# Patient Record
Sex: MALE | Race: BLACK OR AFRICAN AMERICAN | Employment: UNEMPLOYED | URBAN - METROPOLITAN AREA
[De-identification: names, ages, dates, MRNs, and addresses within clinical notes are randomized per-mention and may not be internally consistent; named-entity substitution may affect disease eponyms.]

---

## 2017-05-09 ENCOUNTER — HOSPITAL ENCOUNTER (OUTPATIENT)
Age: 57
Setting detail: SPECIMEN
Discharge: HOME | End: 2017-05-09
Payer: COMMERCIAL

## 2017-05-09 LAB
ALBUMIN SERPL-MCNC: 4.2 G/DL (ref 3.5–5.2)
ALBUMIN/GLOBULIN RATIO: 1.2 (ref 1–2.5)
ALP BLD-CCNC: 71 U/L (ref 40–129)
ALT SERPL-CCNC: 28 U/L (ref 5–41)
ANION GAP SERPL CALCULATED.3IONS-SCNC: 14 MMOL/L (ref 9–17)
AST SERPL-CCNC: 21 U/L
BILIRUB SERPL-MCNC: 0.2 MG/DL (ref 0.3–1.2)
BUN BLDV-MCNC: 12 MG/DL (ref 6–20)
BUN/CREAT BLD: ABNORMAL (ref 9–20)
CALCIUM SERPL-MCNC: 9.7 MG/DL (ref 8.6–10.4)
CHLORIDE BLD-SCNC: 101 MMOL/L (ref 98–107)
CHOLESTEROL/HDL RATIO: 4.9
CHOLESTEROL: 236 MG/DL
CO2: 24 MMOL/L (ref 20–31)
CREAT SERPL-MCNC: 1 MG/DL (ref 0.7–1.2)
CREATININE URINE: 155.6 MG/DL (ref 39–259)
GFR AFRICAN AMERICAN: >60 ML/MIN
GFR NON-AFRICAN AMERICAN: >60 ML/MIN
GFR SERPL CREATININE-BSD FRML MDRD: ABNORMAL ML/MIN/{1.73_M2}
GFR SERPL CREATININE-BSD FRML MDRD: ABNORMAL ML/MIN/{1.73_M2}
GLUCOSE BLD-MCNC: 169 MG/DL (ref 70–99)
HDLC SERPL-MCNC: 48 MG/DL
LDL CHOLESTEROL: 135 MG/DL (ref 0–130)
MICROALBUMIN/CREAT 24H UR: 92 MG/L
MICROALBUMIN/CREAT UR-RTO: 59 MCG/MG CREAT
POTASSIUM SERPL-SCNC: 4.2 MMOL/L (ref 3.7–5.3)
SODIUM BLD-SCNC: 139 MMOL/L (ref 135–144)
TOTAL PROTEIN: 7.6 G/DL (ref 6.4–8.3)
TRIGL SERPL-MCNC: 267 MG/DL
VLDLC SERPL CALC-MCNC: ABNORMAL MG/DL (ref 1–30)

## 2017-05-10 LAB
ESTIMATED AVERAGE GLUCOSE: 197 MG/DL
HBA1C MFR BLD: 8.5 % (ref 4–6)

## 2017-05-12 ENCOUNTER — HOSPITAL ENCOUNTER (OUTPATIENT)
Age: 57
Setting detail: SPECIMEN
Discharge: HOME | End: 2017-05-12
Payer: COMMERCIAL

## 2017-05-12 LAB
DATE, STOOL #1: NORMAL
DATE, STOOL #2: NORMAL
DATE, STOOL #3: NORMAL
HEMOCCULT SP1 STL QL: NEGATIVE
HEMOCCULT SP2 STL QL: NEGATIVE
HEMOCCULT SP3 STL QL: NEGATIVE
TIME, STOOL #1: NORMAL
TIME, STOOL #2: NORMAL
TIME, STOOL #3: NORMAL

## 2017-07-12 ENCOUNTER — HOSPITAL ENCOUNTER (OUTPATIENT)
Age: 57
Setting detail: SPECIMEN
Discharge: HOME | End: 2017-07-12
Payer: COMMERCIAL

## 2017-07-12 LAB
ALBUMIN SERPL-MCNC: 4.2 G/DL (ref 3.5–5.2)
ALBUMIN/GLOBULIN RATIO: 1.2 (ref 1–2.5)
ALP BLD-CCNC: 61 U/L (ref 40–129)
ALT SERPL-CCNC: 17 U/L (ref 5–41)
ANION GAP SERPL CALCULATED.3IONS-SCNC: 16 MMOL/L (ref 9–17)
AST SERPL-CCNC: 19 U/L
BILIRUB SERPL-MCNC: 0.2 MG/DL (ref 0.3–1.2)
BUN BLDV-MCNC: 20 MG/DL (ref 6–20)
BUN/CREAT BLD: ABNORMAL (ref 9–20)
CALCIUM SERPL-MCNC: 9.6 MG/DL (ref 8.6–10.4)
CHLORIDE BLD-SCNC: 100 MMOL/L (ref 98–107)
CHOLESTEROL/HDL RATIO: 3.8
CHOLESTEROL: 188 MG/DL
CO2: 21 MMOL/L (ref 20–31)
CREAT SERPL-MCNC: 1.27 MG/DL (ref 0.7–1.2)
CREATININE URINE: 186.1 MG/DL (ref 39–259)
GFR AFRICAN AMERICAN: >60 ML/MIN
GFR NON-AFRICAN AMERICAN: 58 ML/MIN
GFR SERPL CREATININE-BSD FRML MDRD: ABNORMAL ML/MIN/{1.73_M2}
GFR SERPL CREATININE-BSD FRML MDRD: ABNORMAL ML/MIN/{1.73_M2}
GLUCOSE BLD-MCNC: 122 MG/DL (ref 70–99)
HDLC SERPL-MCNC: 49 MG/DL
LDL CHOLESTEROL: 116 MG/DL (ref 0–130)
MICROALBUMIN/CREAT 24H UR: 17 MG/L
MICROALBUMIN/CREAT UR-RTO: 9 MCG/MG CREAT
POTASSIUM SERPL-SCNC: 4.3 MMOL/L (ref 3.7–5.3)
SODIUM BLD-SCNC: 137 MMOL/L (ref 135–144)
TOTAL PROTEIN: 7.8 G/DL (ref 6.4–8.3)
TRIGL SERPL-MCNC: 114 MG/DL
VLDLC SERPL CALC-MCNC: NORMAL MG/DL (ref 1–30)

## 2017-07-13 LAB
ESTIMATED AVERAGE GLUCOSE: 157 MG/DL
HBA1C MFR BLD: 7.1 % (ref 4–6)

## 2017-12-04 ENCOUNTER — HOSPITAL ENCOUNTER (OUTPATIENT)
Age: 57
Setting detail: SPECIMEN
Discharge: HOME | End: 2017-12-04
Payer: COMMERCIAL

## 2017-12-05 LAB
ESTIMATED AVERAGE GLUCOSE: 137 MG/DL
HBA1C MFR BLD: 6.4 % (ref 4–6)

## 2018-07-27 ENCOUNTER — HOSPITAL ENCOUNTER (OUTPATIENT)
Age: 58
Setting detail: SPECIMEN
Discharge: HOME | End: 2018-07-27
Payer: COMMERCIAL

## 2018-07-27 LAB
ALBUMIN SERPL-MCNC: 4 G/DL (ref 3.5–5.2)
ALBUMIN/GLOBULIN RATIO: 1.2 (ref 1–2.5)
ALP BLD-CCNC: 61 U/L (ref 40–129)
ALT SERPL-CCNC: 16 U/L (ref 5–41)
ANION GAP SERPL CALCULATED.3IONS-SCNC: 12 MMOL/L (ref 9–17)
AST SERPL-CCNC: 16 U/L
BILIRUB SERPL-MCNC: 0.19 MG/DL (ref 0.3–1.2)
BUN BLDV-MCNC: 13 MG/DL (ref 8–23)
BUN/CREAT BLD: ABNORMAL (ref 9–20)
CALCIUM SERPL-MCNC: 9.1 MG/DL (ref 8.6–10.4)
CHLORIDE BLD-SCNC: 103 MMOL/L (ref 98–107)
CHOLESTEROL/HDL RATIO: 4.5
CHOLESTEROL: 191 MG/DL
CO2: 25 MMOL/L (ref 20–31)
CREAT SERPL-MCNC: 0.99 MG/DL (ref 0.7–1.2)
CREATININE URINE: 173.4 MG/DL (ref 39–259)
ESTIMATED AVERAGE GLUCOSE: 146 MG/DL
GFR AFRICAN AMERICAN: >60 ML/MIN
GFR NON-AFRICAN AMERICAN: >60 ML/MIN
GFR SERPL CREATININE-BSD FRML MDRD: ABNORMAL ML/MIN/{1.73_M2}
GFR SERPL CREATININE-BSD FRML MDRD: ABNORMAL ML/MIN/{1.73_M2}
GLUCOSE BLD-MCNC: 107 MG/DL (ref 70–99)
HBA1C MFR BLD: 6.7 % (ref 4–6)
HDLC SERPL-MCNC: 42 MG/DL
LDL CHOLESTEROL: 128 MG/DL (ref 0–130)
MICROALBUMIN/CREAT 24H UR: 14 MG/L
MICROALBUMIN/CREAT UR-RTO: 8 MCG/MG CREAT
POTASSIUM SERPL-SCNC: 4.4 MMOL/L (ref 3.7–5.3)
SODIUM BLD-SCNC: 140 MMOL/L (ref 135–144)
TOTAL PROTEIN: 7.4 G/DL (ref 6.4–8.3)
TRIGL SERPL-MCNC: 107 MG/DL
VLDLC SERPL CALC-MCNC: NORMAL MG/DL (ref 1–30)

## 2018-08-17 ENCOUNTER — TELEPHONE (OUTPATIENT)
Dept: GASTROENTEROLOGY | Age: 58
End: 2018-08-17

## 2018-08-22 ENCOUNTER — TELEPHONE (OUTPATIENT)
Dept: GASTROENTEROLOGY | Age: 58
End: 2018-08-22

## 2018-09-14 ENCOUNTER — HOSPITAL ENCOUNTER (OUTPATIENT)
Age: 58
Setting detail: SPECIMEN
Discharge: HOME | End: 2018-09-14
Payer: COMMERCIAL

## 2019-02-11 ENCOUNTER — HOSPITAL ENCOUNTER (OUTPATIENT)
Age: 59
Setting detail: SPECIMEN
Discharge: HOME | End: 2019-02-11
Payer: COMMERCIAL

## 2019-02-12 LAB
ESTIMATED AVERAGE GLUCOSE: 157 MG/DL
HBA1C MFR BLD: 7.1 % (ref 4–6)

## 2019-04-17 ENCOUNTER — HOSPITAL ENCOUNTER (OUTPATIENT)
Age: 59
Setting detail: SPECIMEN
Discharge: HOME | End: 2019-04-17
Payer: COMMERCIAL

## 2019-04-18 LAB
ESTIMATED AVERAGE GLUCOSE: 154 MG/DL
HBA1C MFR BLD: 7 % (ref 4–6)

## 2019-09-25 ENCOUNTER — HOSPITAL ENCOUNTER (OUTPATIENT)
Age: 59
Setting detail: SPECIMEN
Discharge: HOME | End: 2019-09-25
Payer: COMMERCIAL

## 2019-09-25 LAB
ABSOLUTE EOS #: 0.1 K/UL (ref 0–0.44)
ABSOLUTE IMMATURE GRANULOCYTE: <0.03 K/UL (ref 0–0.3)
ABSOLUTE LYMPH #: 3.01 K/UL (ref 1.1–3.7)
ABSOLUTE MONO #: 0.8 K/UL (ref 0.1–1.2)
ALBUMIN SERPL-MCNC: 4.3 G/DL (ref 3.5–5.2)
ALBUMIN/GLOBULIN RATIO: 1.2 (ref 1–2.5)
ALP BLD-CCNC: 55 U/L (ref 40–129)
ALT SERPL-CCNC: 11 U/L (ref 5–41)
ANION GAP SERPL CALCULATED.3IONS-SCNC: 12 MMOL/L (ref 9–17)
AST SERPL-CCNC: 14 U/L
BASOPHILS # BLD: 1 % (ref 0–2)
BASOPHILS ABSOLUTE: 0.04 K/UL (ref 0–0.2)
BILIRUB SERPL-MCNC: 0.39 MG/DL (ref 0.3–1.2)
BUN BLDV-MCNC: 21 MG/DL (ref 8–23)
BUN/CREAT BLD: ABNORMAL (ref 9–20)
CALCIUM SERPL-MCNC: 9.6 MG/DL (ref 8.6–10.4)
CHLORIDE BLD-SCNC: 99 MMOL/L (ref 98–107)
CO2: 26 MMOL/L (ref 20–31)
CREAT SERPL-MCNC: 1.43 MG/DL (ref 0.7–1.2)
DIFFERENTIAL TYPE: NORMAL
EOSINOPHILS RELATIVE PERCENT: 1 % (ref 1–4)
GFR AFRICAN AMERICAN: >60 ML/MIN
GFR NON-AFRICAN AMERICAN: 50 ML/MIN
GFR SERPL CREATININE-BSD FRML MDRD: ABNORMAL ML/MIN/{1.73_M2}
GFR SERPL CREATININE-BSD FRML MDRD: ABNORMAL ML/MIN/{1.73_M2}
GLUCOSE BLD-MCNC: 123 MG/DL (ref 70–99)
HCT VFR BLD CALC: 41.4 % (ref 40.7–50.3)
HEMOGLOBIN: 13.2 G/DL (ref 13–17)
IMMATURE GRANULOCYTES: 0 %
LYMPHOCYTES # BLD: 37 % (ref 24–43)
MCH RBC QN AUTO: 30.6 PG (ref 25.2–33.5)
MCHC RBC AUTO-ENTMCNC: 31.9 G/DL (ref 28.4–34.8)
MCV RBC AUTO: 96.1 FL (ref 82.6–102.9)
MONOCYTES # BLD: 10 % (ref 3–12)
NRBC AUTOMATED: 0 PER 100 WBC
PDW BLD-RTO: 12.9 % (ref 11.8–14.4)
PLATELET # BLD: 377 K/UL (ref 138–453)
PLATELET ESTIMATE: NORMAL
PMV BLD AUTO: 11.3 FL (ref 8.1–13.5)
POTASSIUM SERPL-SCNC: 3.8 MMOL/L (ref 3.7–5.3)
RBC # BLD: 4.31 M/UL (ref 4.21–5.77)
RBC # BLD: NORMAL 10*6/UL
SEG NEUTROPHILS: 51 % (ref 36–65)
SEGMENTED NEUTROPHILS ABSOLUTE COUNT: 4.22 K/UL (ref 1.5–8.1)
SODIUM BLD-SCNC: 137 MMOL/L (ref 135–144)
TOTAL PROTEIN: 7.8 G/DL (ref 6.4–8.3)
WBC # BLD: 8.2 K/UL (ref 3.5–11.3)
WBC # BLD: NORMAL 10*3/UL

## 2019-11-06 ENCOUNTER — HOSPITAL ENCOUNTER (OUTPATIENT)
Age: 59
Setting detail: SPECIMEN
Discharge: HOME | End: 2019-11-06
Payer: COMMERCIAL

## 2019-11-06 LAB — PROSTATE SPECIFIC ANTIGEN: 15.94 UG/L

## 2020-07-09 ENCOUNTER — TELEPHONE (OUTPATIENT)
Dept: UROLOGY | Age: 60
End: 2020-07-09

## 2020-10-29 ENCOUNTER — TELEPHONE (OUTPATIENT)
Dept: UROLOGY | Age: 60
End: 2020-10-29

## 2020-10-29 ENCOUNTER — OFFICE VISIT (OUTPATIENT)
Dept: UROLOGY | Age: 60
End: 2020-10-29
Payer: COMMERCIAL

## 2020-10-29 VITALS
SYSTOLIC BLOOD PRESSURE: 140 MMHG | BODY MASS INDEX: 38.17 KG/M2 | WEIGHT: 288 LBS | TEMPERATURE: 97.3 F | HEART RATE: 65 BPM | DIASTOLIC BLOOD PRESSURE: 89 MMHG | HEIGHT: 73 IN

## 2020-10-29 PROCEDURE — 99204 OFFICE O/P NEW MOD 45 MIN: CPT | Performed by: UROLOGY

## 2020-10-29 RX ORDER — CIPROFLOXACIN 500 MG/1
500 TABLET, FILM COATED ORAL 2 TIMES DAILY
Qty: 8 TABLET | Refills: 0 | Status: SHIPPED | OUTPATIENT
Start: 2020-11-16 | End: 2020-11-20

## 2020-10-29 RX ORDER — HYDROCHLOROTHIAZIDE 25 MG/1
25 TABLET ORAL DAILY
COMMUNITY

## 2020-10-29 RX ORDER — LISINOPRIL 40 MG/1
40 TABLET ORAL DAILY
COMMUNITY

## 2020-10-29 RX ORDER — SILDENAFIL 100 MG/1
100 TABLET, FILM COATED ORAL PRN
COMMUNITY
End: 2021-06-03 | Stop reason: ALTCHOICE

## 2020-10-29 RX ORDER — ATENOLOL 50 MG/1
50 TABLET ORAL DAILY
COMMUNITY

## 2020-10-29 RX ORDER — PRAVASTATIN SODIUM 40 MG
40 TABLET ORAL DAILY
COMMUNITY

## 2020-10-29 RX ORDER — AMLODIPINE BESYLATE 10 MG/1
10 TABLET ORAL DAILY
COMMUNITY
End: 2022-09-23

## 2020-10-29 ASSESSMENT — ENCOUNTER SYMPTOMS
EYE REDNESS: 0
SHORTNESS OF BREATH: 0
WHEEZING: 0
EYE PAIN: 0
NAUSEA: 0
VOMITING: 0
COUGH: 0
BACK PAIN: 0
DIARRHEA: 0
ABDOMINAL PAIN: 0
CONSTIPATION: 0

## 2020-10-29 NOTE — PROGRESS NOTES
[address] [address]  Dept: [address] [facility] Urology Office Note - New Patient    Patient:  Jose R Ruiz  YOB: 1960  Date: 10/29/2020    The patient is a 56 y.o. male who presentstoday for evaluation of the following problems:   Chief Complaint   Patient presents with    New Patient    Elevated PSA    referred by Fifi Fong. HPI  Here for high psa, its 15 ng/ml. No prostatitis symptoms, has h/o bph, and ED. Takes viagra, no family hx of prostate cancer. No dyaruia, no hematuria, mod stream    (Patient's old records have been requested, reviewed and summarized in today's note.)    Summary of old records: N/A    History: N/A    ProceduresToday: N/A    Urinalysis today:  No results found for this visit on 10/29/20.     AUA Symptom Score (10/29/2020):  INCOMPLETE EMPTYING: How often have you had the sensation of not emptying your bladder?: Not at all  FREQUENCY: How often do you have to urinate less than every two hours?: Not at all  INTERMITTENCY: How often have you found you stopped and started again several times when you urinated?: Not at all  URGENCY: How often have you found it difficult to postpone urination?: Not at all  WEAK STREAM: How often have you had a weak urinary stream?: Not at all  STRAINING: How often have you had to strain to start  urination?: Not at all  NOCTURIA: How many times did you typically get up at night to uriniate?: 1 Time  TOTAL I-PSS SCORE[de-identified] 1  How would you feel if you were to spend the rest of your life with your urinary condition?: Pleased    Last BUN andcreatinine:  Lab Results   Component Value Date    BUN 21 09/25/2019     Lab Results   Component Value Date    CREATININE 1.43 (H) 09/25/2019       Additional Lab/Culture results: none    Reviewed during this Office Visit: none  (results were independently reviewed byphysician and radiology report verified)    PAST MEDICAL, FAMILY AND SOCIAL HISTORY:  Past Medical History:   Diagnosis Date    Back pain     Diabetes mellitus ([address]     Dyslipidemia     Elevated PSA     Erectile dysfunction     Hyperlipemia     Hypertension     LVH (left ventricular hypertrophy)     Pedal edema     Systolic murmur      Past Surgical History:   Procedure Laterality Date    HERNIA REPAIR       History reviewed. No pertinent family history. Outpatient Medications Marked as Taking for the 10/29/20 encounter (Office Visit) with Helga Angelucci, MD   Medication Sig Dispense Refill    SITagliptin (JANUVIA) 50 MG tablet Take 50 mg by mouth daily      atenolol (TENORMIN) 50 MG tablet Take 50 mg by mouth daily      metFORMIN (GLUCOPHAGE) 1000 MG tablet Take 1,000 mg by mouth 2 times daily (with meals)      amLODIPine (NORVASC) 10 MG tablet Take 10 mg by mouth daily      hydroCHLOROthiazide (HYDRODIURIL) 25 MG tablet Take 25 mg by mouth daily      lisinopril (PRINIVIL;ZESTRIL) 40 MG tablet Take 40 mg by mouth daily      pravastatin (PRAVACHOL) 40 MG tablet Take 40 mg by mouth daily      sildenafil (VIAGRA) 100 MG tablet Take 100 mg by mouth as needed for Erectile Dysfunction         Patient has no known allergies. Social History     Tobacco Use   Smoking Status Never Smoker   Smokeless Tobacco Never Used      (If patient a smoker, smoking cessation counseling offered)   Social History     Substance and Sexual Activity   Alcohol Use Yes       REVIEW OF SYSTEMS:  Review of Systems    Physical Exam:    This a 56 y.o. female      Vitals:    10/29/20 0925   BP: (!) 140/89   Pulse: 65   Temp: 97.3 °F (36.3 °C)     Body mass index is 38 kg/m². Physical Exam  Constitutional: Patient in no acute distress, ggod grooming, appropriately dressed  Neuro: Alert and oriented to person, place and time.   Psych:Mood normal, affect normal  Skin: No rash noted  HEENT: Head: Normocephalic and atraumatic,Conjunctivae and EOM are normal,Nose- normal, Right/Left External Ear: normal, Mouth: Mucosa Moist  Neck: Supple  Lungs: Respiratory effort is normal  Cardiovascular: strong and regular, no lower leg edema  Abdomen: Soft, non-tender, non-distended with no CVA,    Lymphatics: No cervical palpable lymphadenopathy. Bladder non-tender and not distended. Musculoskeletal: Normal gait and station        Assessment and Plan      1. Benign prostatic hyperplasia with incomplete bladder emptying    2. Elevated PSA    3. Erectile dysfunction due to arterial insufficiency            Plan:    trus prostate biopsy at [facility] or [facility]  cipro    Prescriptions Ordered:  No orders of the defined types were placed in this encounter. Orders Placed:  No orders of the defined types were placed in this encounter. Teddy Cuellar MD    Agree with the ROS entered by the MA.

## 2020-10-29 NOTE — PROGRESS NOTES
Cipro ordered per Dr Sho Schneider biopsy protocol. Pt to begin taking Cipro twice daily the day before procedure. Pt is aware, per surgery scheduler Gayle.

## 2020-10-29 NOTE — TELEPHONE ENCOUNTER
Prostate BX & US @ [facility] 11/17/20 12:30pm **STOP BLOOD THINNERS 11/10/20**   PAT @ [facility] 12/03/20 2:45pm   covid-19 @ [facility] 11/13/20 8:15am         Spoke with patient in office, procedure info given to patient

## 2020-10-29 NOTE — PROGRESS NOTES
Review of Systems   Constitutional: Negative for appetite change, chills and fever. Eyes: Negative for pain, redness and visual disturbance. Respiratory: Negative for cough, shortness of breath and wheezing. Cardiovascular: Negative for chest pain and leg swelling. Gastrointestinal: Negative for abdominal pain, constipation, diarrhea, nausea and vomiting. Genitourinary: Positive for testicular pain (left side). Negative for difficulty urinating, dysuria, flank pain, frequency, hematuria and urgency. Musculoskeletal: Negative for back pain, joint swelling and myalgias. Skin: Negative for rash and wound. Neurological: Negative for dizziness, tremors and numbness. Hematological: Does not bruise/bleed easily.

## 2020-11-03 ENCOUNTER — HOSPITAL ENCOUNTER (OUTPATIENT)
Dept: PREADMISSION TESTING | Age: 60
Discharge: HOME | End: 2020-11-07
Payer: COMMERCIAL

## 2020-11-03 VITALS
HEIGHT: 73 IN | HEART RATE: 73 BPM | SYSTOLIC BLOOD PRESSURE: 155 MMHG | WEIGHT: 293 LBS | RESPIRATION RATE: 16 BRPM | TEMPERATURE: 98.1 F | BODY MASS INDEX: 38.83 KG/M2 | OXYGEN SATURATION: 100 % | DIASTOLIC BLOOD PRESSURE: 81 MMHG

## 2020-11-03 LAB
ABSOLUTE EOS #: 0.1 K/UL (ref 0–0.4)
ABSOLUTE IMMATURE GRANULOCYTE: ABNORMAL K/UL (ref 0–0.3)
ABSOLUTE LYMPH #: 3 K/UL (ref 1–4.8)
ABSOLUTE MONO #: 0.6 K/UL (ref 0.1–1.3)
ANION GAP SERPL CALCULATED.3IONS-SCNC: 10 MMOL/L (ref 9–17)
BASOPHILS # BLD: 1 % (ref 0–2)
BASOPHILS ABSOLUTE: 0.1 K/UL (ref 0–0.2)
BUN BLDV-MCNC: 17 MG/DL (ref 8–23)
BUN/CREAT BLD: ABNORMAL (ref 9–20)
CALCIUM SERPL-MCNC: 9.3 MG/DL (ref 8.6–10.4)
CHLORIDE BLD-SCNC: 100 MMOL/L (ref 98–107)
CO2: 27 MMOL/L (ref 20–31)
CREAT SERPL-MCNC: 1.3 MG/DL (ref 0.7–1.2)
DIFFERENTIAL TYPE: ABNORMAL
EOSINOPHILS RELATIVE PERCENT: 1 % (ref 0–4)
GFR AFRICAN AMERICAN: >60 ML/MIN
GFR NON-AFRICAN AMERICAN: 56 ML/MIN
GFR SERPL CREATININE-BSD FRML MDRD: ABNORMAL ML/MIN/{1.73_M2}
GFR SERPL CREATININE-BSD FRML MDRD: ABNORMAL ML/MIN/{1.73_M2}
GLUCOSE BLD-MCNC: 267 MG/DL (ref 70–99)
HCT VFR BLD CALC: 40 % (ref 41–53)
HEMOGLOBIN: 14.1 G/DL (ref 13.5–17.5)
IMMATURE GRANULOCYTES: ABNORMAL %
LYMPHOCYTES # BLD: 38 % (ref 24–44)
MCH RBC QN AUTO: 32 PG (ref 26–34)
MCHC RBC AUTO-ENTMCNC: 35.2 G/DL (ref 31–37)
MCV RBC AUTO: 90.9 FL (ref 80–100)
MONOCYTES # BLD: 8 % (ref 1–7)
NRBC AUTOMATED: ABNORMAL PER 100 WBC
PDW BLD-RTO: 13 % (ref 11.5–14.9)
PLATELET # BLD: 324 K/UL (ref 150–450)
PLATELET ESTIMATE: ABNORMAL
PMV BLD AUTO: 8.9 FL (ref 6–12)
POTASSIUM SERPL-SCNC: 4.1 MMOL/L (ref 3.7–5.3)
RBC # BLD: 4.4 M/UL (ref 4.5–5.9)
RBC # BLD: ABNORMAL 10*6/UL
SEG NEUTROPHILS: 52 % (ref 36–66)
SEGMENTED NEUTROPHILS ABSOLUTE COUNT: 4.2 K/UL (ref 1.3–9.1)
SODIUM BLD-SCNC: 137 MMOL/L (ref 135–144)
WBC # BLD: 8 K/UL (ref 3.5–11)
WBC # BLD: ABNORMAL 10*3/UL

## 2020-11-03 PROCEDURE — 85025 COMPLETE CBC W/AUTO DIFF WBC: CPT

## 2020-11-03 PROCEDURE — 36415 COLL VENOUS BLD VENIPUNCTURE: CPT

## 2020-11-03 PROCEDURE — 93005 ELECTROCARDIOGRAM TRACING: CPT | Performed by: ANESTHESIOLOGY

## 2020-11-03 PROCEDURE — 87086 URINE CULTURE/COLONY COUNT: CPT

## 2020-11-03 PROCEDURE — 80048 BASIC METABOLIC PNL TOTAL CA: CPT

## 2020-11-03 NOTE — H&P
HISTORY and [address]       NAME:  Mehnaz Mac  MRN: [id number]   YOB: 1960   Date: 11/3/2020   Age: 56 y.o. Gender: male       COMPLAINT AND PRESENT HISTORY:     Mehnaz Mac is 56 y.o. male, here for preanesthesia testing for elevated PSA with scheduled prostate biopsy. Per chart notes, PSA around 15. Denies history of BPH. No family history of prostate cancer. Denies dysuria, hematuria, weak stream or nocturia. Reports left testicle pain intermittently especially when wearing tight underwear. Denies  flank pain, gross hematuria, kidney stones and recurrent UTI. PMHx  DM: Pt unsure of last A1C. Takes [place] and metformin. HTN: Takes amlodipine, lisinopril, HCTZ, atentolol. Denies chest pain/pressure, palpitations, SOB, dizziness, headaches or changes in vision. Denies complications with anesthesia. PAST MEDICAL HISTORY     Past Medical History:   Diagnosis Date    Back pain     Diabetes mellitus ([address]     Dyslipidemia     Elevated PSA     Erectile dysfunction     Hyperlipemia     Hypertension     LVH (left ventricular hypertrophy)     Pedal edema     Systolic murmur      Pt denies any history of stroke, heart disease, COPD, Asthma, GERD, Cancer, Seizures,Thyroid disease, Kidney Disease, Hepatitis, TB, Psychiatric Disorders or Substance abuse. SURGICAL HISTORY       Past Surgical History:   Procedure Laterality Date    HERNIA REPAIR      as baby    HERNIA REPAIR         FAMILY HISTORY     History reviewed. No pertinent family history.     SOCIAL HISTORY       Social History     Socioeconomic History    Marital status: Single     Spouse name: None    Number of children: None    Years of education: None    Highest education level: None   Occupational History    None   Social Needs    Financial resource strain: None    Food insecurity     Worry: None     Inability: None    Transportation needs     Medical: None     Non-medical: None Tobacco Use    Smoking status: Never Smoker    Smokeless tobacco: Never Used   Substance and Sexual Activity    Alcohol use: Yes     Comment: socially    Drug use: Never    Sexual activity: Yes   Lifestyle    Physical activity     Days per week: None     Minutes per session: None    Stress: None   Relationships    Social connections     Talks on phone: None     Gets together: None     Attends Adventism service: None     Active member of club or organization: None     Attends meetings of clubs or organizations: None     Relationship status: None    Intimate partner violence     Fear of current or ex partner: None     Emotionally abused: None     Physically abused: None     Forced sexual activity: None   Other Topics Concern    None   Social History Narrative    None        REVIEW OF SYSTEMS      No Known Allergies    Current Outpatient Medications on File Prior to Encounter   Medication Sig Dispense Refill    SITagliptin (JANUVIA) 50 MG tablet Take 50 mg by mouth daily      atenolol (TENORMIN) 50 MG tablet Take 50 mg by mouth daily      metFORMIN (GLUCOPHAGE) 1000 MG tablet Take 1,000 mg by mouth 2 times daily (with meals)      amLODIPine (NORVASC) 10 MG tablet Take 10 mg by mouth daily      hydroCHLOROthiazide (HYDRODIURIL) 25 MG tablet Take 25 mg by mouth daily      lisinopril (PRINIVIL;ZESTRIL) 40 MG tablet Take 40 mg by mouth daily      pravastatin (PRAVACHOL) 40 MG tablet Take 40 mg by mouth daily      sildenafil (VIAGRA) 100 MG tablet Take 100 mg by mouth as needed for Erectile Dysfunction      [START ON 11/16/2020] ciprofloxacin (CIPRO) 500 MG tablet Take 1 tablet by mouth 2 times daily for 4 days Start the day before the procedure 8 tablet 0     No current facility-administered medications on file prior to encounter. General health:  Fairly good. No fever or chills. Skin:  No itching, redness or rash. HEENT:  No headache, epistaxis or sore throat. Neck:  No pain, stiffness or masses. Cardiovascular/Respiratory system:  No chest pain, palpitation or shortness of breath. Gastrointestinal tract: No abdominal pain, Dysphagia, nausea, vomiting, diarrhea or constipation. Genitourinary:  See HPI. Locomotor:  No bone or joint pains. No swelling. Neuropsychiatric:  No referable complaints. GENERAL PHYSICAL EXAM:     Vitals: BP (!) 155/81   Pulse 73   Temp 98.1 °F (36.7 °C) (Infrared)   Resp 16   Ht 6' 1\" (1.854 m)   Wt 293 lb (132.9 kg)   SpO2 100%   BMI 38.66 kg/m²  Body mass index is 38.66 kg/m². GENERAL APPEARANCE:   Rambo Woods is 56 y.o.,  male, moderately obese, nourished, conscious, alert. Does not appear to be in distress or pain at this time. SKIN:  Warm, dry, no cyanosis or jaundice. HEAD:  Normocephalic, atraumatic. EYES:  Pupils equal, reactive to light. EARS:  No discharge, no marked hearing loss. NOSE:  No rhinorrhea, epistaxis or septal deformity. THROAT:  Not congested. No ulceration bleeding or discharge. NECK:  No stiffness, trachea central.  No palpable masses or L.N.                 CHEST:  Symmetrical and equal on expansion. HEART:  Hypertensive. RRR S1 > S2. 2/6 systolic murmur auscultated                LUNGS:  Equal on expansion, normal breath sounds. No wheezing, rhonchi or rales. ABDOMEN:  Soft on palpation. No localized tenderness. No guarding or rigidity. LYMPHATICS:  No palpable cervical lymphadenopathy. LOCOMOTOR, BACK AND SPINE:  No tenderness or deformities. EXTREMITIES:  Symmetrical, no pretibial edema. No calf tenderness. No discoloration or ulcerations. NEUROLOGIC:  The patient is conscious, alert, oriented.  No facial drooping. Speech clear. No apparent focal sensory or motor deficits. PROVISIONAL DIAGNOSES / SURGERY:      ELEVATED PSA    PROSTATE BIOPSY W/ US & TECH TO OR    There are no active problems to display for this patient.           MAURICIO Elena - CNP on 11/3/2020 at 3:41 PM

## 2020-11-03 NOTE — H&P (VIEW-ONLY)
HISTORY and [address]       NAME:  Tisha Solomon  MRN: [id number]   YOB: 1960   Date: 11/3/2020   Age: 56 y.o. Gender: male       COMPLAINT AND PRESENT HISTORY:     Tisha Solomon is 56 y.o. male, here for preanesthesia testing for elevated PSA with scheduled prostate biopsy. Per chart notes, PSA around 15. Denies history of BPH. No family history of prostate cancer. Denies dysuria, hematuria, weak stream or nocturia. Reports left testicle pain intermittently especially when wearing tight underwear. Denies  flank pain, gross hematuria, kidney stones and recurrent UTI. PMHx  DM: Pt unsure of last A1C. Takes [place] and [place] and metformin. HTN: Takes amlodipine, lisinopril, HCTZ, atentolol. Denies chest pain/pressure, palpitations, SOB, dizziness, headaches or changes in vision. Denies complications with anesthesia. PAST MEDICAL HISTORY     Past Medical History:   Diagnosis Date    Back pain     Diabetes mellitus ([address]     Dyslipidemia     Elevated PSA     Erectile dysfunction     Hyperlipemia     Hypertension     LVH (left ventricular hypertrophy)     Pedal edema     Systolic murmur      Pt denies any history of stroke, heart disease, COPD, Asthma, GERD, Cancer, Seizures,Thyroid disease, Kidney Disease, Hepatitis, TB, Psychiatric Disorders or Substance abuse. SURGICAL HISTORY       Past Surgical History:   Procedure Laterality Date    HERNIA REPAIR      as baby    HERNIA REPAIR         FAMILY HISTORY     History reviewed. No pertinent family history.     SOCIAL HISTORY       Social History     Socioeconomic History    Marital status: Single     Spouse name: None    Number of children: None    Years of education: None    Highest education level: None   Occupational History    None   Social Needs    Financial resource strain: None    Food insecurity     Worry: None     Inability: None    Transportation needs     Medical: None     Non-medical: None Tobacco Use    Smoking status: Never Smoker    Smokeless tobacco: Never Used   Substance and Sexual Activity    Alcohol use: Yes     Comment: socially    Drug use: Never    Sexual activity: Yes   Lifestyle    Physical activity     Days per week: None     Minutes per session: None    Stress: None   Relationships    Social connections     Talks on phone: None     Gets together: None     Attends Taoist service: None     Active member of club or organization: None     Attends meetings of clubs or organizations: None     Relationship status: None    Intimate partner violence     Fear of current or ex partner: None     Emotionally abused: None     Physically abused: None     Forced sexual activity: None   Other Topics Concern    None   Social History Narrative    None        REVIEW OF SYSTEMS      No Known Allergies    Current Outpatient Medications on File Prior to Encounter   Medication Sig Dispense Refill    SITagliptin (JANUVIA) 50 MG tablet Take 50 mg by mouth daily      atenolol (TENORMIN) 50 MG tablet Take 50 mg by mouth daily      metFORMIN (GLUCOPHAGE) 1000 MG tablet Take 1,000 mg by mouth 2 times daily (with meals)      amLODIPine (NORVASC) 10 MG tablet Take 10 mg by mouth daily      hydroCHLOROthiazide (HYDRODIURIL) 25 MG tablet Take 25 mg by mouth daily      lisinopril (PRINIVIL;ZESTRIL) 40 MG tablet Take 40 mg by mouth daily      pravastatin (PRAVACHOL) 40 MG tablet Take 40 mg by mouth daily      sildenafil (VIAGRA) 100 MG tablet Take 100 mg by mouth as needed for Erectile Dysfunction      [START ON 11/16/2020] ciprofloxacin (CIPRO) 500 MG tablet Take 1 tablet by mouth 2 times daily for 4 days Start the day before the procedure 8 tablet 0     No current facility-administered medications on file prior to encounter. General health:  Fairly good. No fever or chills. Skin:  No itching, redness or rash. HEENT:  No headache, epistaxis or sore throat. facial drooping. Speech clear. No apparent focal sensory or motor deficits. PROVISIONAL DIAGNOSES / SURGERY:      ELEVATED PSA    PROSTATE BIOPSY W/ US & TECH TO OR    There are no active problems to display for this patient.           MAURICIO Winslow - CNP on 11/3/2020 at 3:41 PM

## 2020-11-04 LAB
CULTURE: NO GROWTH
Lab: NORMAL
SPECIMEN DESCRIPTION: NORMAL

## 2020-11-05 ENCOUNTER — TELEPHONE (OUTPATIENT)
Dept: UROLOGY | Age: 60
End: 2020-11-05

## 2020-11-05 NOTE — TELEPHONE ENCOUNTER
Left message with patient that procedure time on 11/17/2020 has been changed to 10:00am with an arrival of 8:00am. No eating or drinking after midnight, fleets enema will need to be done at 7:00am. Stated in message that I can e-mail new procedure info or it can be picked up in the office.

## 2020-11-06 LAB
EKG ATRIAL RATE: 72 BPM
EKG P AXIS: 0 DEGREES
EKG P-R INTERVAL: 200 MS
EKG Q-T INTERVAL: 386 MS
EKG QRS DURATION: 86 MS
EKG QTC CALCULATION (BAZETT): 422 MS
EKG R AXIS: -5 DEGREES
EKG T AXIS: 17 DEGREES
EKG VENTRICULAR RATE: 72 BPM

## 2020-11-06 PROCEDURE — 93010 ELECTROCARDIOGRAM REPORT: CPT | Performed by: INTERNAL MEDICINE

## 2020-11-13 ENCOUNTER — HOSPITAL ENCOUNTER (OUTPATIENT)
Dept: PREADMISSION TESTING | Age: 60
Setting detail: SPECIMEN
Discharge: HOME | End: 2020-11-17
Payer: COMMERCIAL

## 2020-11-13 PROCEDURE — U0003 INFECTIOUS AGENT DETECTION BY NUCLEIC ACID (DNA OR RNA); SEVERE ACUTE RESPIRATORY SYNDROME CORONAVIRUS 2 (SARS-COV-2) (CORONAVIRUS DISEASE [COVID-19]), AMPLIFIED PROBE TECHNIQUE, MAKING USE OF HIGH THROUGHPUT TECHNOLOGIES AS DESCRIBED BY CMS-2020-01-R: HCPCS

## 2020-11-14 LAB
SARS-COV-2, RAPID: NORMAL
SARS-COV-2: NORMAL
SARS-COV-2: NOT DETECTED
SOURCE: NORMAL

## 2020-11-16 ENCOUNTER — ANESTHESIA EVENT (OUTPATIENT)
Dept: OPERATING ROOM | Age: 60
End: 2020-11-16
Payer: COMMERCIAL

## 2020-11-17 ENCOUNTER — HOSPITAL ENCOUNTER (OUTPATIENT)
Dept: ULTRASOUND IMAGING | Age: 60
Setting detail: OUTPATIENT SURGERY
Discharge: HOME | End: 2020-11-19
Attending: UROLOGY
Payer: COMMERCIAL

## 2020-11-17 ENCOUNTER — ANESTHESIA (OUTPATIENT)
Dept: OPERATING ROOM | Age: 60
End: 2020-11-17
Payer: COMMERCIAL

## 2020-11-17 ENCOUNTER — HOSPITAL ENCOUNTER (OUTPATIENT)
Age: 60
Setting detail: OUTPATIENT SURGERY
Discharge: HOME | End: 2020-11-17
Attending: UROLOGY | Admitting: UROLOGY
Payer: COMMERCIAL

## 2020-11-17 VITALS — DIASTOLIC BLOOD PRESSURE: 44 MMHG | SYSTOLIC BLOOD PRESSURE: 94 MMHG | OXYGEN SATURATION: 100 %

## 2020-11-17 VITALS
OXYGEN SATURATION: 100 % | WEIGHT: 293 LBS | DIASTOLIC BLOOD PRESSURE: 82 MMHG | HEART RATE: 58 BPM | TEMPERATURE: 97 F | HEIGHT: 73 IN | SYSTOLIC BLOOD PRESSURE: 129 MMHG | BODY MASS INDEX: 38.83 KG/M2 | RESPIRATION RATE: 16 BRPM

## 2020-11-17 LAB
GLUCOSE BLD-MCNC: 134 MG/DL (ref 75–110)
GLUCOSE BLD-MCNC: 137 MG/DL (ref 75–110)

## 2020-11-17 PROCEDURE — 7100000000 HC PACU RECOVERY - FIRST 15 MIN: Performed by: UROLOGY

## 2020-11-17 PROCEDURE — 3600000002 HC SURGERY LEVEL 2 BASE: Performed by: UROLOGY

## 2020-11-17 PROCEDURE — 88344 IMHCHEM/IMCYTCHM EA MLT ANTB: CPT

## 2020-11-17 PROCEDURE — 7100000001 HC PACU RECOVERY - ADDTL 15 MIN: Performed by: UROLOGY

## 2020-11-17 PROCEDURE — 55700 US BIOPSY PROSTATE NEEDLE/PUNCH: CPT

## 2020-11-17 PROCEDURE — 7100000030 HC ASPR PHASE II RECOVERY - FIRST 15 MIN: Performed by: UROLOGY

## 2020-11-17 PROCEDURE — 88305 TISSUE EXAM BY PATHOLOGIST: CPT

## 2020-11-17 PROCEDURE — 6360000002 HC RX W HCPCS: Performed by: NURSE ANESTHETIST, CERTIFIED REGISTERED

## 2020-11-17 PROCEDURE — 2500000003 HC RX 250 WO HCPCS: Performed by: UROLOGY

## 2020-11-17 PROCEDURE — 7100000011 HC PHASE II RECOVERY - ADDTL 15 MIN: Performed by: UROLOGY

## 2020-11-17 PROCEDURE — 3600000012 HC SURGERY LEVEL 2 ADDTL 15MIN: Performed by: UROLOGY

## 2020-11-17 PROCEDURE — 82947 ASSAY GLUCOSE BLOOD QUANT: CPT

## 2020-11-17 PROCEDURE — 6360000002 HC RX W HCPCS: Performed by: UROLOGY

## 2020-11-17 PROCEDURE — 3700000001 HC ADD 15 MINUTES (ANESTHESIA): Performed by: UROLOGY

## 2020-11-17 PROCEDURE — 3700000000 HC ANESTHESIA ATTENDED CARE: Performed by: UROLOGY

## 2020-11-17 PROCEDURE — 7100000031 HC ASPR PHASE II RECOVERY - ADDTL 15 MIN: Performed by: UROLOGY

## 2020-11-17 PROCEDURE — 2709999900 HC NON-CHARGEABLE SUPPLY: Performed by: UROLOGY

## 2020-11-17 PROCEDURE — 2580000003 HC RX 258: Performed by: ANESTHESIOLOGY

## 2020-11-17 PROCEDURE — 7100000010 HC PHASE II RECOVERY - FIRST 15 MIN: Performed by: UROLOGY

## 2020-11-17 RX ORDER — 0.9 % SODIUM CHLORIDE 0.9 %
500 INTRAVENOUS SOLUTION INTRAVENOUS
Status: DISCONTINUED | OUTPATIENT
Start: 2020-11-17 | End: 2020-11-17 | Stop reason: HOSPADM

## 2020-11-17 RX ORDER — SODIUM CHLORIDE 0.9 % (FLUSH) 0.9 %
10 SYRINGE (ML) INJECTION EVERY 12 HOURS SCHEDULED
Status: DISCONTINUED | OUTPATIENT
Start: 2020-11-17 | End: 2020-11-17 | Stop reason: HOSPADM

## 2020-11-17 RX ORDER — HYDRALAZINE HYDROCHLORIDE 20 MG/ML
5 INJECTION INTRAMUSCULAR; INTRAVENOUS EVERY 10 MIN PRN
Status: DISCONTINUED | OUTPATIENT
Start: 2020-11-17 | End: 2020-11-17 | Stop reason: HOSPADM

## 2020-11-17 RX ORDER — LABETALOL HYDROCHLORIDE 5 MG/ML
5 INJECTION, SOLUTION INTRAVENOUS EVERY 10 MIN PRN
Status: DISCONTINUED | OUTPATIENT
Start: 2020-11-17 | End: 2020-11-17 | Stop reason: HOSPADM

## 2020-11-17 RX ORDER — DIPHENHYDRAMINE HYDROCHLORIDE 50 MG/ML
12.5 INJECTION INTRAMUSCULAR; INTRAVENOUS
Status: DISCONTINUED | OUTPATIENT
Start: 2020-11-17 | End: 2020-11-17 | Stop reason: HOSPADM

## 2020-11-17 RX ORDER — LIDOCAINE HYDROCHLORIDE 10 MG/ML
INJECTION, SOLUTION EPIDURAL; INFILTRATION; INTRACAUDAL; PERINEURAL PRN
Status: DISCONTINUED | OUTPATIENT
Start: 2020-11-17 | End: 2020-11-17 | Stop reason: ALTCHOICE

## 2020-11-17 RX ORDER — SODIUM CHLORIDE, SODIUM LACTATE, POTASSIUM CHLORIDE, CALCIUM CHLORIDE 600; 310; 30; 20 MG/100ML; MG/100ML; MG/100ML; MG/100ML
INJECTION, SOLUTION INTRAVENOUS CONTINUOUS
Status: DISCONTINUED | OUTPATIENT
Start: 2020-11-17 | End: 2020-11-17 | Stop reason: HOSPADM

## 2020-11-17 RX ORDER — PROMETHAZINE HYDROCHLORIDE 25 MG/ML
6.25 INJECTION, SOLUTION INTRAMUSCULAR; INTRAVENOUS
Status: DISCONTINUED | OUTPATIENT
Start: 2020-11-17 | End: 2020-11-17 | Stop reason: HOSPADM

## 2020-11-17 RX ORDER — SODIUM CHLORIDE 0.9 % (FLUSH) 0.9 %
10 SYRINGE (ML) INJECTION PRN
Status: DISCONTINUED | OUTPATIENT
Start: 2020-11-17 | End: 2020-11-17 | Stop reason: HOSPADM

## 2020-11-17 RX ORDER — PROPOFOL 10 MG/ML
INJECTION, EMULSION INTRAVENOUS PRN
Status: DISCONTINUED | OUTPATIENT
Start: 2020-11-17 | End: 2020-11-17 | Stop reason: SDUPTHER

## 2020-11-17 RX ORDER — METOCLOPRAMIDE HYDROCHLORIDE 5 MG/ML
10 INJECTION INTRAMUSCULAR; INTRAVENOUS
Status: DISCONTINUED | OUTPATIENT
Start: 2020-11-17 | End: 2020-11-17 | Stop reason: HOSPADM

## 2020-11-17 RX ORDER — SODIUM CHLORIDE 9 MG/ML
INJECTION, SOLUTION INTRAVENOUS CONTINUOUS
Status: DISCONTINUED | OUTPATIENT
Start: 2020-11-17 | End: 2020-11-17 | Stop reason: HOSPADM

## 2020-11-17 RX ORDER — PROPOFOL 10 MG/ML
INJECTION, EMULSION INTRAVENOUS CONTINUOUS PRN
Status: DISCONTINUED | OUTPATIENT
Start: 2020-11-17 | End: 2020-11-17 | Stop reason: SDUPTHER

## 2020-11-17 RX ORDER — LIDOCAINE HYDROCHLORIDE 10 MG/ML
1 INJECTION, SOLUTION EPIDURAL; INFILTRATION; INTRACAUDAL; PERINEURAL
Status: DISCONTINUED | OUTPATIENT
Start: 2020-11-17 | End: 2020-11-17 | Stop reason: HOSPADM

## 2020-11-17 RX ORDER — FENTANYL CITRATE 50 UG/ML
25 INJECTION, SOLUTION INTRAMUSCULAR; INTRAVENOUS EVERY 5 MIN PRN
Status: DISCONTINUED | OUTPATIENT
Start: 2020-11-17 | End: 2020-11-17 | Stop reason: HOSPADM

## 2020-11-17 RX ORDER — OXYCODONE HYDROCHLORIDE AND ACETAMINOPHEN 5; 325 MG/1; MG/1
1 TABLET ORAL
Status: DISCONTINUED | OUTPATIENT
Start: 2020-11-17 | End: 2020-11-17 | Stop reason: HOSPADM

## 2020-11-17 RX ADMIN — PROPOFOL 125 MCG/KG/MIN: 10 INJECTION, EMULSION INTRAVENOUS at 10:41

## 2020-11-17 RX ADMIN — PROPOFOL 100 MG: 10 INJECTION, EMULSION INTRAVENOUS at 10:41

## 2020-11-17 RX ADMIN — Medication 2 G: at 10:43

## 2020-11-17 RX ADMIN — PROPOFOL 50 MG: 10 INJECTION, EMULSION INTRAVENOUS at 10:43

## 2020-11-17 RX ADMIN — SODIUM CHLORIDE, POTASSIUM CHLORIDE, SODIUM LACTATE AND CALCIUM CHLORIDE: 600; 310; 30; 20 INJECTION, SOLUTION INTRAVENOUS at 08:43

## 2020-11-17 ASSESSMENT — PULMONARY FUNCTION TESTS
PIF_VALUE: 0
PIF_VALUE: 1
PIF_VALUE: 0
PIF_VALUE: 1
PIF_VALUE: 0
PIF_VALUE: 1
PIF_VALUE: 0
PIF_VALUE: 0

## 2020-11-17 ASSESSMENT — PAIN SCALES - GENERAL
PAINLEVEL_OUTOF10: 0
PAINLEVEL_OUTOF10: 0

## 2020-11-17 NOTE — ANESTHESIA POSTPROCEDURE EVALUATION
Department of Anesthesiology  Postprocedure Note    Patient: Dareen Siemens  MRN: [id number]  YOB: 1960  Date of evaluation: 11/17/2020  Time:  12:45 PM     Procedure Summary     Date:  11/17/20 Room / Location:  [address] / [address]    Anesthesia Start:  0331 Anesthesia Stop:  0658    Procedure:  PROSTATE BIOPSY W/ [address] (N/A Scrotum) Diagnosis:  (ELEVATED PSA)    Surgeon:  Murali Hernandez MD Responsible Provider:  Ulices Montalvo MD    Anesthesia Type:  MAC ASA Status:  3          Anesthesia Type: MAC    Claudio Phase I: Claudio Score: 10    Claudio Phase II:      Last vitals: Reviewed and per EMR flowsheets.        Anesthesia Post Evaluation    Comments: POST- ANESTHESIA EVALUATION       Pt Name: Dareen Siemens  MRN: [id number]  YOB: 1960  Date of evaluation: 11/17/2020  Time:  12:45 PM      /75   Pulse 60   Temp 97 °F (36.1 °C) (Temporal)   Resp 16   Ht 6' 1\" (1.854 m)   Wt 293 lb (132.9 kg)   SpO2 100%   BMI 38.66 kg/m²      Consciousness Level  Awake  Cardiopulmonary Status  Stable  Pain Adequately Treated YES  Nausea / Vomiting  NO  Adequate Hydration  YES  Anesthesia Related Complications NONE      Electronically signed by Ulices Montalvo MD on 11/17/2020 at 12:45 PM

## 2020-11-17 NOTE — INTERVAL H&P NOTE
Update History & Physical    The patient's History and Physical of November 3, 2020 was reviewed with the patient and I examined the patient. There was no change. The surgical site was confirmed by the patient and me. Patient has been NPO since midnight. No blood thinners in the past 7 days. Patient took his cardiac meds without any water. Plan: The risks, benefits, expected outcome, and alternative to the recommended procedure have been discussed with the patient. Patient understands and wants to proceed with the procedure.      Electronically signed by MAURICIO Rosales CNP on 11/17/2020 at 8:06 AM

## 2020-11-17 NOTE — ANESTHESIA PRE PROCEDURE
Department of Anesthesiology  Preprocedure Note       Name:  Caridad Mccallum   Age:  56 y.o.  :  1960                                          MRN:  [id number]         Date:  2020      Surgeon: Oswaldo Bautista):  Perla Diaz MD    Procedure: Procedure(s):  PROSTATE BIOPSY W/ US & TECH TO OR    Medications prior to admission:   Prior to Admission medications    Medication Sig Start Date End Date Taking?  Authorizing Provider   SITagliptin (JANUVIA) 50 MG tablet Take 50 mg by mouth daily    Historical Provider, MD   atenolol (TENORMIN) 50 MG tablet Take 50 mg by mouth daily    Historical Provider, MD   metFORMIN (GLUCOPHAGE) 1000 MG tablet Take 1,000 mg by mouth 2 times daily (with meals)    Historical Provider, MD   amLODIPine (NORVASC) 10 MG tablet Take 10 mg by mouth daily    Historical Provider, MD   hydroCHLOROthiazide (HYDRODIURIL) 25 MG tablet Take 25 mg by mouth daily    Historical Provider, MD   lisinopril (PRINIVIL;ZESTRIL) 40 MG tablet Take 40 mg by mouth daily    Historical Provider, MD   pravastatin (PRAVACHOL) 40 MG tablet Take 40 mg by mouth daily    Historical Provider, MD   sildenafil (VIAGRA) 100 MG tablet Take 100 mg by mouth as needed for Erectile Dysfunction    Historical Provider, MD   ciprofloxacin (CIPRO) 500 MG tablet Take 1 tablet by mouth 2 times daily for 4 days Start the day before the procedure 20  Perla Diaz MD       Current medications:    Current Facility-Administered Medications   Medication Dose Route Frequency Provider Last Rate Last Dose    lactated ringers infusion   Intravenous Continuous [place] MD Carey        sodium chloride flush 0.9 % injection 10 mL  10 mL Intravenous 2 times per day Allie Mccloud MD        sodium chloride flush 0.9 % injection 10 mL  10 mL Intravenous PRN [place] MD Carey        lidocaine PF 1 % injection 1 mL  1 mL Intradermal Once PRN Silvia Patino MD        0.9 % sodium chloride infusion   Intravenous Continuous [place] MD Carey           Allergies:  No Known Allergies    Problem List:  There is no problem list on file for this patient. Past Medical History:        Diagnosis Date    Back pain     Diabetes mellitus (HCC)     Dyslipidemia     Elevated PSA     Erectile dysfunction     Hyperlipemia     Hypertension     LVH (left ventricular hypertrophy)     Pedal edema     Systolic murmur        Past Surgical History:        Procedure Laterality Date    HERNIA REPAIR      as baby    HERNIA REPAIR         Social History:    Social History     Tobacco Use    Smoking status: Never Smoker    Smokeless tobacco: Never Used   Substance Use Topics    Alcohol use: Yes     Comment: socially                                Counseling given: Not Answered      Vital Signs (Current): There were no vitals filed for this visit. BP Readings from Last 3 Encounters:   11/03/20 (!) 155/81   10/29/20 (!) 140/89       NPO Status:                                                                                 BMI:   Wt Readings from Last 3 Encounters:   11/03/20 293 lb (132.9 kg)   10/29/20 288 lb (130.6 kg)     There is no height or weight on file to calculate BMI.    CBC:   Lab Results   Component Value Date    WBC 8.0 11/03/2020    RBC 4.40 11/03/2020    HGB 14.1 11/03/2020    HCT 40.0 11/03/2020    MCV 90.9 11/03/2020    RDW 13.0 11/03/2020     11/03/2020       CMP:   Lab Results   Component Value Date     11/03/2020    K 4.1 11/03/2020     11/03/2020    CO2 27 11/03/2020    BUN 17 11/03/2020    CREATININE 1.30 11/03/2020    GFRAA >60 11/03/2020    LABGLOM 56 11/03/2020    GLUCOSE 267 11/03/2020    PROT 7.8 09/25/2019    CALCIUM 9.3 11/03/2020    BILITOT 0.39 09/25/2019    ALKPHOS 55 09/25/2019    AST 14 09/25/2019    ALT 11 09/25/2019       POC Tests: No results for input(s): POCGLU, POCNA, POCK, POCCL, POCBUN, POCHEMO, POCHCT in the last 72 hours.     Coags: No results found for: PROTIME, INR, APTT    HCG (If Applicable): No results found for: PREGTESTUR, PREGSERUM, HCG, HCGQUANT     ABGs: No results found for: PHART, PO2ART, YDI9JLK, XZV1SHV, BEART, N6LOMNHS     Type & Screen (If Applicable):  No results found for: LABABO, LABRH    Drug/Infectious Status (If Applicable):  No results found for: HIV, HEPCAB    COVID-19 Screening (If Applicable):   Lab Results   Component Value Date    COVID19 Not Detected 11/13/2020         Anesthesia Evaluation  Patient summary reviewed and Nursing notes reviewed no history of anesthetic complications:   Airway: Mallampati: III  TM distance: >3 FB   Neck ROM: full  Mouth opening: > = 3 FB Dental:          Pulmonary:Negative Pulmonary ROS and normal exam  breath sounds clear to auscultation                             Cardiovascular:    (+) hypertension:, valvular problems/murmurs:, murmur, hyperlipidemia      ECG reviewed  Rhythm: regular  Rate: normal                 ROS comment: Normal sinus rhythm  Normal ECG    Estimated LV EF 55 %   Mild left ventricular hypertrophy. Left atrium is mildly dilated. Mild mitral regurgitation. Moderate tricuspid regurgitation. Estimated right ventricular systolic pressure is 46 mm Hg      Neuro/Psych:   Negative Neuro/Psych ROS              GI/Hepatic/Renal:   (+) morbid obesity          Endo/Other:    (+) Diabetes, . Abdominal:           Vascular:                                      Anesthesia Plan      MAC     ASA 3       Induction: intravenous. MIPS: Postoperative opioids intended and Prophylactic antiemetics administered. Anesthetic plan and risks discussed with patient. Plan discussed with CRNA. The patient was counseled at length about the risks of cheryl Covid-19 during their perioperative period and any recovery window from their procedure.   The patient was made aware that cheryl Covid-19  may worsen their prognosis for recovering from their

## 2020-11-17 NOTE — BRIEF OP NOTE
Brief Postoperative Note      Patient: Hafsa Renee  YOB: 1960  MRN: [id number]    Date of Procedure: 11/17/2020    Pre-Op Diagnosis: ELEVATED PSA    Post-Op Diagnosis: Same       Procedure(s):  PROSTATE BIOPSY W/ US & TECH TO OR    Surgeon(s):  Dania Hogan MD    Assistant:  * No surgical staff found *    Anesthesia: Monitor Anesthesia Care    Estimated Blood Loss (mL): Minimal    Complications: None    Specimens:   * No specimens in log *    Implants:  * No implants in log *      Drains: * No LDAs found *    Findings: d    Electronically signed by Dania Hogan MD on 11/17/2020 at 8:37 AM

## 2020-11-19 NOTE — OP NOTE
[address] [facility]                 [address] [facility], [address]                                OPERATIVE REPORT    PATIENT NAME: Sara Dias                       :        1960  MED REC NO:   [id number]                              ROOM:  ACCOUNT NO:   [de-identified]                           ADMIT DATE: 2020  PROVIDER:     Na Benites    DATE OF PROCEDURE:  2020    PREOPERATIVE DIAGNOSIS:  Elevated PSA. POSTOPERATIVE DIAGNOSIS:  Elevated PSA. OPERATION PERFORMED:  Transrectal ultrasound-guided prostate biopsy. SURGEON:  Na Benites MD    ASSISTANT:  None. ANESTHESIA:  MAC.    COMPLICATIONS:  None. ESTIMATED BLOOD LOSS:  Minimal.    NARRATIVE OF PROCEDURE:  This is a 73-year-old white male, who has a  history of elevated PSA and BPH. Given this, the above procedure was  scheduled. All the risks and benefits were explained to the patient and  informed consent was obtained. OPERATIVE PROCEDURE:  This 73-year-old white male was brought back to  the operating room and positioned in the modified flank position. After  MAC anesthesia was started, he was sterilely prepped and draped in  standard fashion. A transrectal ultrasound probe was placed into his  rectum and the prostate was examined. No hyperechoic or hypoechoic  lesions were noted. The urethra was normal.  The bladder was normal.   The total volume of the prostate was 62 cubic centimeters. After this,  1% lidocaine was used to infiltrate the neurovascular bundles and then  biopsies were taken from the left lateral base, left medial base, left  lateral mid gland, left medial mid gland, left lateral apex, left medial  apex. The same was done on the other side with biopsies from the right  lateral base, right medial base, right lateral mid gland, right medial  mid gland, right lateral apex, right medial apex. All the specimens  were sent as permanent specimens to Pathology.   I then removed the scope  and that was the end of the procedure. The patient will be discharged  home. He will follow up with me in 10 days to discuss the results. I  was present and scrubbed throughout the entire case.         Astrid Serrano    D: 11/18/2020 11:17:26       T: 11/18/2020 21:25:14     [id number]  Job#: [id number]     Doc#: [id number]    CC:  Na Benites

## 2020-11-20 LAB — SURGICAL PATHOLOGY REPORT: NORMAL

## 2020-12-03 ENCOUNTER — OFFICE VISIT (OUTPATIENT)
Dept: UROLOGY | Age: 60
End: 2020-12-03
Payer: COMMERCIAL

## 2020-12-03 VITALS
SYSTOLIC BLOOD PRESSURE: 129 MMHG | DIASTOLIC BLOOD PRESSURE: 89 MMHG | WEIGHT: 293 LBS | HEART RATE: 76 BPM | HEIGHT: 73 IN | TEMPERATURE: 98.2 F | BODY MASS INDEX: 38.83 KG/M2 | RESPIRATION RATE: 18 BRPM

## 2020-12-03 PROCEDURE — 99214 OFFICE O/P EST MOD 30 MIN: CPT | Performed by: UROLOGY

## 2020-12-03 ASSESSMENT — ENCOUNTER SYMPTOMS
CONSTIPATION: 0
NAUSEA: 0
ABDOMINAL PAIN: 0
BACK PAIN: 0
DIARRHEA: 0
COUGH: 0
VOMITING: 0
SHORTNESS OF BREATH: 0
WHEEZING: 0
EYE PAIN: 0

## 2020-12-03 NOTE — PROGRESS NOTES
Review of Systems   Constitutional: Negative for appetite change, chills, fatigue and fever. Eyes: Negative for pain and visual disturbance. Respiratory: Negative for cough, shortness of breath and wheezing. Cardiovascular: Negative for chest pain and leg swelling. Gastrointestinal: Negative for abdominal pain, constipation, diarrhea, nausea and vomiting. Genitourinary: Negative for difficulty urinating, dysuria, frequency, hematuria, penile pain and testicular pain. Musculoskeletal: Negative for back pain and myalgias. Neurological: Positive for tremors and numbness. Negative for dizziness, weakness, light-headedness and headaches. Hematological: Negative for adenopathy. Does not bruise/bleed easily.

## 2020-12-03 NOTE — PROGRESS NOTES
[address] [address]  Dept: [address] [facility] Office Note - Established    Patient:  Caridad Mccallum  YOB: 1960  Date: 12/3/2020    The patient is a 56 y.o. male who presents todayfor evaluation of the following problems:   Chief Complaint   Patient presents with    Follow-up     s/p Prostate bx results       HPI  Here for prostate biopsy results, biopsy reviewed and shows vasu 6 and 8 cancer. He also has bph. Urinates ok, no cardiac hx      Summary of old records: N/A    Additional History: N/A    Procedures Today: N/A    Urinalysis today:  No results found for this visit on 12/03/20.   Last several PSA's:  Lab Results   Component Value Date    PSA 15.94 (H) 11/06/2019     Last total testosterone:  No results found for: TESTOSTERONE    AUA Symptom Score (12/3/2020):  INCOMPLETE EMPTYING: How often have you had the sensation of not emptying your bladder?: Less than 1 to 5 times  FREQUENCY: How often do you have to urinate less than every two hours?: Not at all  INTERMITTENCY: How often have you found you stopped and started again several times when you urinated?: Not at all  URGENCY: How often have you found it difficult to postpone urination?: Not at all  WEAK STREAM: How often have you had a weak urinary stream?: Less than 1 to 5 times  STRAINING: How often have you had to strain to start  urination?: Not at all  NOCTURIA: How many times did you typically get up at night to uriniate?: 1 Time  TOTAL I-PSS SCORE[de-identified] 3  How would you feel if you were to spend the rest of your life with your urinary condition?: Pleased    Last BUN and creatinine:  Lab Results   Component Value Date    BUN 17 11/03/2020     Lab Results   Component Value Date    CREATININE 1.30 (H) 11/03/2020       Additional Lab/Culture results: none    Imaging Reviewed during this Office Visit: none  (results were independently reviewed by physician and radiology report verified)    PAST MEDICAL, FAMILY AND SOCIAL HISTORY UPDATE:  Past Medical History:   Diagnosis Date    Back pain     Diabetes mellitus ([address]     Dyslipidemia     Elevated PSA     Erectile dysfunction     Hyperlipemia     Hypertension     LVH (left ventricular hypertrophy)     Pedal edema     Systolic murmur      Past Surgical History:   Procedure Laterality Date    HERNIA REPAIR      as baby    HERNIA REPAIR      PROSTATE BIOPSY N/A 11/17/2020    PROSTATE BIOPSY W/ US & TECH TO OR performed by Dalton Teran MD at [facility]  11/17/2020    US PROSTATE NEEDLE PUNCH 11/17/2020 [facility]     No family history on file. Outpatient Medications Marked as Taking for the 12/3/20 encounter (Office Visit) with Dalton Teran MD   Medication Sig Dispense Refill    SITagliptin (JANUVIA) 50 MG tablet Take 50 mg by mouth daily      atenolol (TENORMIN) 50 MG tablet Take 50 mg by mouth daily      metFORMIN (GLUCOPHAGE) 1000 MG tablet Take 1,000 mg by mouth 2 times daily (with meals)      amLODIPine (NORVASC) 10 MG tablet Take 10 mg by mouth daily      hydroCHLOROthiazide (HYDRODIURIL) 25 MG tablet Take 25 mg by mouth daily      lisinopril (PRINIVIL;ZESTRIL) 40 MG tablet Take 40 mg by mouth daily      pravastatin (PRAVACHOL) 40 MG tablet Take 40 mg by mouth daily      sildenafil (VIAGRA) 100 MG tablet Take 100 mg by mouth as needed for Erectile Dysfunction         Patient has no known allergies. Social History     Tobacco Use   Smoking Status Never Smoker   Smokeless Tobacco Never Used     (Ifpatient a smoker, smoking cessation counseling offered)    Social History     Substance and Sexual Activity   Alcohol Use Yes    Comment: socially       REVIEW OF SYSTEMS:  Review of Systems    Physical Exam:      Vitals:    12/03/20 1156   BP: 129/89   Pulse: 76   Resp: 18   Temp: 98.2 °F (36.8 °C)     Body mass index is 38.66 kg/m².   Patient is a 56 y.o. male in no acute distress and alert and oriented to person, place and time. Physical Exam  Constitutional: Patient in no acute distress. Neuro: Alert and oriented to person, place and time. Psych: Mood normal, affect normal  Skin: No rash noted  HEENT: Head: Normocephalic andatraumatic  Conjunctivae and EOM are normal. Pupils are equal, round  Nose:Normal  Right External Ear: Normal; Left External Ear: Normal  Mouth: Mucosa Moist  Neck: Supple  Lungs: Respiratory effort is normal  Cardiovascular: Warm & Pink  Abdomen: Soft, non-tender, non-distended with no CVA,  No flank tenderness,  Or hepatosplenomegaly   Lymphatics: No palpablelymphadenopathy. Assessment and Plan      1. Prostate cancer ([address]    2. Elevated PSA    3. Benign prostatic hyperplasia with incomplete bladder emptying           Plan:     after imaging  sami surgery,   Do mod standard and nodes  Return in about 2 weeks (around 12/17/2020) for Follow up. Prescriptions Ordered:  No orders of the defined types were placed in this encounter. Orders Placed:  Orders Placed This Encounter   Procedures    NM BONE SCAN WHOLE BODY     Standing Status:   Future     Standing Expiration Date:   12/4/2021    CT ABDOMEN PELVIS W IV CONTRAST Additional Contrast? None     Standing Status:   Future     Standing Expiration Date:   12/3/2021     Order Specific Question:   Additional Contrast?     Answer:   None           Mayuri Munroe MD    Agree with the ROS entered by the MA.

## 2020-12-09 ENCOUNTER — HOSPITAL ENCOUNTER (OUTPATIENT)
Dept: CT IMAGING | Age: 60
Discharge: HOME | End: 2020-12-11
Payer: COMMERCIAL

## 2020-12-09 ENCOUNTER — HOSPITAL ENCOUNTER (OUTPATIENT)
Dept: NUCLEAR MEDICINE | Age: 60
Discharge: HOME | End: 2020-12-11
Payer: COMMERCIAL

## 2020-12-09 PROCEDURE — A9503 TC99M MEDRONATE: HCPCS | Performed by: UROLOGY

## 2020-12-09 PROCEDURE — 3430000000 HC RX DIAGNOSTIC RADIOPHARMACEUTICAL: Performed by: UROLOGY

## 2020-12-09 PROCEDURE — 78306 BONE IMAGING WHOLE BODY: CPT

## 2020-12-09 PROCEDURE — 6360000004 HC RX CONTRAST MEDICATION: Performed by: UROLOGY

## 2020-12-09 PROCEDURE — 74177 CT ABD & PELVIS W/CONTRAST: CPT

## 2020-12-09 PROCEDURE — 2580000003 HC RX 258: Performed by: UROLOGY

## 2020-12-09 RX ORDER — 0.9 % SODIUM CHLORIDE 0.9 %
80 INTRAVENOUS SOLUTION INTRAVENOUS ONCE
Status: COMPLETED | OUTPATIENT
Start: 2020-12-09 | End: 2020-12-09

## 2020-12-09 RX ORDER — SODIUM CHLORIDE 0.9 % (FLUSH) 0.9 %
10 SYRINGE (ML) INJECTION PRN
Status: DISCONTINUED | OUTPATIENT
Start: 2020-12-09 | End: 2020-12-12 | Stop reason: HOSPADM

## 2020-12-09 RX ORDER — TC 99M MEDRONATE 20 MG/10ML
25 INJECTION, POWDER, LYOPHILIZED, FOR SOLUTION INTRAVENOUS
Status: COMPLETED | OUTPATIENT
Start: 2020-12-09 | End: 2020-12-09

## 2020-12-09 RX ADMIN — SODIUM CHLORIDE 80 ML: 9 INJECTION, SOLUTION INTRAVENOUS at 09:51

## 2020-12-09 RX ADMIN — Medication 10 ML: at 09:51

## 2020-12-09 RX ADMIN — TC 99M MEDRONATE 24 MILLICURIE: 20 INJECTION, POWDER, LYOPHILIZED, FOR SOLUTION INTRAVENOUS at 08:41

## 2020-12-09 RX ADMIN — IOPAMIDOL 100 ML: 755 INJECTION, SOLUTION INTRAVENOUS at 09:51

## 2020-12-09 RX ADMIN — IOHEXOL 30 ML: 300 INJECTION, SOLUTION INTRAVENOUS at 09:51

## 2020-12-15 ENCOUNTER — OFFICE VISIT (OUTPATIENT)
Dept: UROLOGY | Age: 60
End: 2020-12-15
Payer: COMMERCIAL

## 2020-12-15 ENCOUNTER — TELEPHONE (OUTPATIENT)
Dept: UROLOGY | Age: 60
End: 2020-12-15

## 2020-12-15 VITALS — SYSTOLIC BLOOD PRESSURE: 135 MMHG | DIASTOLIC BLOOD PRESSURE: 81 MMHG | TEMPERATURE: 98 F | HEART RATE: 64 BPM

## 2020-12-15 PROCEDURE — 99214 OFFICE O/P EST MOD 30 MIN: CPT | Performed by: UROLOGY

## 2020-12-15 ASSESSMENT — ENCOUNTER SYMPTOMS
SHORTNESS OF BREATH: 0
WHEEZING: 0
ABDOMINAL PAIN: 0
EYE REDNESS: 0
NAUSEA: 0
BACK PAIN: 0
DIARRHEA: 0
COUGH: 0
CONSTIPATION: 0
EYE PAIN: 0
VOMITING: 0

## 2020-12-15 NOTE — TELEPHONE ENCOUNTER
Robotic Prostatectomy @ [facility] 01/13/2021 2:45pm - 12:45pm arrival   Pre-robotic teaching @ [facility] 12/30/2020 9:00am  PAT @ [facility] 12/30/2020 1:00pm  COVID-19 test @ [facility] 01/09/2021 11:30am  Cystogram @ [facility] 01/20/2021 9:00am  Post op appt @ [facility] 01/20/2021 11:30am        Spoke with patient regarding procedure information. **STOP BLOOD THINNERS 1/6/2021**. **START CLEAR LIQUID DIET 1/12/2021**    Procedure info given to patient in the office on 12/15/2020.

## 2020-12-15 NOTE — PROGRESS NOTES
[address] [address]  Dept: [address] [facility]rology Office Note - Established    Patient:  Dc Courtney  YOB: 1960  Date: 12/15/2020    The patient is a 56 y.o. male who presents todayfor evaluation of the following problems:   Chief Complaint   Patient presents with    Results     bone scan        HPI  Here for prostate biopsy results, biopsy reviewed and shows vasu 6 and 8 cancer. He also has bph. Urinates ok, no cardiac hx  Bone scan and ct scan are neg    Summary of old records: N/A    Additional History: N/A    Procedures Today: N/A    Urinalysis today:  No results found for this visit on 12/15/20.   Last several PSA's:  Lab Results   Component Value Date    PSA 15.94 (H) 11/06/2019     Last total testosterone:  No results found for: TESTOSTERONE    AUA Symptom Score (12/15/2020):  INCOMPLETE EMPTYING: How often have you had the sensation of not emptying your bladder?: Not at all  FREQUENCY: How often do you have to urinate less than every two hours?: Not at all  INTERMITTENCY: How often have you found you stopped and started again several times when you urinated?: Not at all  URGENCY: How often have you found it difficult to postpone urination?: Not at all  WEAK STREAM: How often have you had a weak urinary stream?: Not at all  STRAINING: How often have you had to strain to start  urination?: Not at all  NOCTURIA: How many times did you typically get up at night to uriniate?: NONE  TOTAL I-PSS SCORE[de-identified] 0  How would you feel if you were to spend the rest of your life with your urinary condition?: Pleased    Last BUN and creatinine:  Lab Results   Component Value Date    BUN 17 11/03/2020     Lab Results   Component Value Date    CREATININE 1.30 (H) 11/03/2020       Additional Lab/Culture results: none    Imaging Reviewed during this Office Visit: none  (results were independently reviewed by physician and radiology report verified)    PAST MEDICAL, FAMILY AND SOCIAL HISTORY UPDATE:  Past Medical History:   Diagnosis Date    Back pain     Diabetes mellitus ([address]     Dyslipidemia     Elevated PSA     Erectile dysfunction     Hyperlipemia     Hypertension     LVH (left ventricular hypertrophy)     Pedal edema     Systolic murmur      Past Surgical History:   Procedure Laterality Date    HERNIA REPAIR      as baby    HERNIA REPAIR      PROSTATE BIOPSY N/A 11/17/2020    PROSTATE BIOPSY W/ US & TECH TO OR performed by Jemma Vazquez MD at [facility]  11/17/2020    US PROSTATE NEEDLE PUNCH 11/17/2020 [facility]     No family history on file. Outpatient Medications Marked as Taking for the 12/15/20 encounter (Office Visit) with Jemma Vazquez MD   Medication Sig Dispense Refill    SITagliptin (JANUVIA) 50 MG tablet Take 50 mg by mouth daily      atenolol (TENORMIN) 50 MG tablet Take 50 mg by mouth daily      metFORMIN (GLUCOPHAGE) 1000 MG tablet Take 1,000 mg by mouth 2 times daily (with meals)      amLODIPine (NORVASC) 10 MG tablet Take 10 mg by mouth daily      hydroCHLOROthiazide (HYDRODIURIL) 25 MG tablet Take 25 mg by mouth daily      lisinopril (PRINIVIL;ZESTRIL) 40 MG tablet Take 40 mg by mouth daily      pravastatin (PRAVACHOL) 40 MG tablet Take 40 mg by mouth daily      sildenafil (VIAGRA) 100 MG tablet Take 100 mg by mouth as needed for Erectile Dysfunction         Patient has no known allergies. Social History     Tobacco Use   Smoking Status Never Smoker   Smokeless Tobacco Never Used     (Ifpatient a smoker, smoking cessation counseling offered)    Social History     Substance and Sexual Activity   Alcohol Use Yes    Comment: socially       REVIEW OF SYSTEMS:  Review of Systems    Physical Exam:      Vitals:    12/15/20 1319   BP: 135/81   Pulse: 64   Temp: 98 °F (36.7 °C)     There is no height or weight on file to calculate BMI.   Patient is a 56 y.o. male in no acute distress and alert and oriented to person, place and time. Physical Exam  Constitutional: Patient in no acute distress. Neuro: Alert and oriented to person, place and time. Psych: Mood normal, affect normal  Skin: No rash noted  HEENT: Head: Normocephalic andatraumatic  Conjunctivae and EOM are normal. Pupils are equal, round  Nose:Normal  Right External Ear: Normal; Left External Ear: Normal  Mouth: Mucosa Moist  Neck: Supple  Lungs: Respiratory effort is normal  Cardiovascular: Warm & Pink  Abdomen: Soft, non-tender, non-distended with no CVA,  No flank tenderness,  Or hepatosplenomegaly   Lymphatics: No palpablelymphadenopathy. Assessment and Plan      1. Prostate cancer ([address]    2. Elevated PSA    3. Benign prostatic hyperplasia with incomplete bladder emptying           Plan:     robo prosate and nodes at [facility]  Mod standard and nodes  Enter lap from side  No follow-ups on file. Prescriptions Ordered:  No orders of the defined types were placed in this encounter. Orders Placed:  No orders of the defined types were placed in this encounter. Davon Ellis MD    Agree with the ROS entered by the MA.

## 2020-12-29 RX ORDER — SODIUM CHLORIDE, SODIUM LACTATE, POTASSIUM CHLORIDE, CALCIUM CHLORIDE 600; 310; 30; 20 MG/100ML; MG/100ML; MG/100ML; MG/100ML
1000 INJECTION, SOLUTION INTRAVENOUS CONTINUOUS
Status: CANCELLED | OUTPATIENT
Start: 2020-12-29

## 2020-12-30 ENCOUNTER — HOSPITAL ENCOUNTER (OUTPATIENT)
Dept: PREADMISSION TESTING | Age: 60
Discharge: HOME | End: 2021-01-03
Payer: COMMERCIAL

## 2020-12-30 ENCOUNTER — OFFICE VISIT (OUTPATIENT)
Dept: UROLOGY | Age: 60
End: 2020-12-30
Payer: COMMERCIAL

## 2020-12-30 VITALS
HEART RATE: 66 BPM | WEIGHT: 293 LBS | HEIGHT: 73 IN | BODY MASS INDEX: 38.83 KG/M2 | SYSTOLIC BLOOD PRESSURE: 125 MMHG | TEMPERATURE: 98.2 F | DIASTOLIC BLOOD PRESSURE: 77 MMHG

## 2020-12-30 VITALS
SYSTOLIC BLOOD PRESSURE: 124 MMHG | HEIGHT: 73 IN | HEART RATE: 67 BPM | RESPIRATION RATE: 16 BRPM | BODY MASS INDEX: 38.17 KG/M2 | TEMPERATURE: 98.7 F | DIASTOLIC BLOOD PRESSURE: 68 MMHG | OXYGEN SATURATION: 100 % | WEIGHT: 288 LBS

## 2020-12-30 LAB
ANION GAP SERPL CALCULATED.3IONS-SCNC: 18 MMOL/L (ref 9–17)
BUN BLDV-MCNC: 24 MG/DL (ref 8–23)
CHLORIDE BLD-SCNC: 101 MMOL/L (ref 98–107)
CO2: 23 MMOL/L (ref 20–31)
CREAT SERPL-MCNC: 1.64 MG/DL (ref 0.7–1.2)
GFR AFRICAN AMERICAN: 52 ML/MIN
GFR NON-AFRICAN AMERICAN: 43 ML/MIN
GFR SERPL CREATININE-BSD FRML MDRD: ABNORMAL ML/MIN/{1.73_M2}
GFR SERPL CREATININE-BSD FRML MDRD: ABNORMAL ML/MIN/{1.73_M2}
GLUCOSE BLD-MCNC: 177 MG/DL (ref 70–99)
HCT VFR BLD CALC: 38.2 % (ref 40.7–50.3)
HEMOGLOBIN: 12.6 G/DL (ref 13–17)
MCH RBC QN AUTO: 30.2 PG (ref 25.2–33.5)
MCHC RBC AUTO-ENTMCNC: 33 G/DL (ref 28.4–34.8)
MCV RBC AUTO: 91.6 FL (ref 82.6–102.9)
NRBC AUTOMATED: 0 PER 100 WBC
PDW BLD-RTO: 13.2 % (ref 11.8–14.4)
PLATELET # BLD: 363 K/UL (ref 138–453)
PMV BLD AUTO: 10.4 FL (ref 8.1–13.5)
POTASSIUM SERPL-SCNC: 3.8 MMOL/L (ref 3.7–5.3)
RBC # BLD: 4.17 M/UL (ref 4.21–5.77)
SODIUM BLD-SCNC: 142 MMOL/L (ref 135–144)
WBC # BLD: 7.8 K/UL (ref 3.5–11.3)

## 2020-12-30 PROCEDURE — 36415 COLL VENOUS BLD VENIPUNCTURE: CPT

## 2020-12-30 PROCEDURE — 82947 ASSAY GLUCOSE BLOOD QUANT: CPT

## 2020-12-30 PROCEDURE — 85027 COMPLETE CBC AUTOMATED: CPT

## 2020-12-30 PROCEDURE — 86901 BLOOD TYPING SEROLOGIC RH(D): CPT

## 2020-12-30 PROCEDURE — 87086 URINE CULTURE/COLONY COUNT: CPT

## 2020-12-30 PROCEDURE — 84520 ASSAY OF UREA NITROGEN: CPT

## 2020-12-30 PROCEDURE — 82565 ASSAY OF CREATININE: CPT

## 2020-12-30 PROCEDURE — 86920 COMPATIBILITY TEST SPIN: CPT

## 2020-12-30 PROCEDURE — 86900 BLOOD TYPING SEROLOGIC ABO: CPT

## 2020-12-30 PROCEDURE — 99214 OFFICE O/P EST MOD 30 MIN: CPT | Performed by: NURSE PRACTITIONER

## 2020-12-30 PROCEDURE — 86850 RBC ANTIBODY SCREEN: CPT

## 2020-12-30 PROCEDURE — 80051 ELECTROLYTE PANEL: CPT

## 2020-12-30 ASSESSMENT — ENCOUNTER SYMPTOMS
DIARRHEA: 0
COUGH: 0
CONSTIPATION: 0
EYE PAIN: 0
VOMITING: 0
NAUSEA: 0
ABDOMINAL PAIN: 0
WHEEZING: 0
SHORTNESS OF BREATH: 0
BACK PAIN: 0
EYE REDNESS: 0

## 2020-12-30 NOTE — PROGRESS NOTES
Anesthesia Focused Assessment    STOP-BANG Sleep Apnea Questionnaire    SNORE loudly (heard through closed doors)? No  TIRED, fatigued, sleepy during daytime? No  OBSERVED stopping breathing during sleep? No  High blood PRESSURE being treated? Yes    BMI over 35? Yes  AGE over 48? Yes  NECK circumference over 16\"? No  GENDER (male)? Yes             Total 4  High risk 5-8  Intermediate risk 3-4  Low risk 0-2    Obstructive Sleep Apnea: denies  If YES, machine used: no     Type 1 DM:   no  T2DM:  yes    Coronary Artery Disease:  denies  Hypertension:  yes    Active smoker:  no  Drinks Alcohol:  socially    Dentition: benign    Defib / AICD / Pacemaker: no      Renal Failure/dialysis:  no    Patient was evaluated in PAT & anesthesia guidelines were applied. NPO guidelines, medication instructions and scheduled arrival time were reviewed with patient. Hx of anesthesia complications:  no  Family hx of anesthesia complications:  no                                                                                                                     Anesthesia contacted:   Yes, Dr. Mynor Varela, murmur and BLE edema. Medical or cardiac clearance ordered: yes, PCP with cardiac attention. Evaluate for the need for echo due to systolic murmur first noted earlier this year and BLE edema. Patient also complains of intermittent/mild shortness of breath. Last echo was in 2014.     Neisha Crespo PA-C  12/30/20  12:15 PM

## 2020-12-30 NOTE — PATIENT INSTRUCTIONS
1. Start kegels exercise to help with post op incontinence: do not do kegel or quick flick with cath in- do pre-operation and after catheteri is removed  Kegel exercise: squeeze and hold 3 seconds, relax and release 6 seconds, repeat 10-15 times in a row, at lease 6 times  per day  Quick flicks: after kegel, squeeze, release no hold, 12-15 times in a row, at least 6 times per day   disposable briefs as discussed. 2. Pre-op bowel cleanse as ordered- clear liquids the day before surgery    3. Avoid post-op constipation as discussed    4. Follow-up Cystograms scheduled 1 week after surgery, start antibiotic the day before the cystogram on the 19th, take the day off and complete the day after, call the office if you did not get a prescription for antibiotic at discharge. After cystogram, come directly to office for post-op appt    5. Call with questions or concerns    6.  No pump ordered per pt request

## 2020-12-30 NOTE — H&P (VIEW-ONLY)
History and Physical    Pt Name: Destiny Palomo  MRN: [id number]  YOB: 1960  Date of evaluation: 12/30/2020    SUBJECTIVE:   History of Chief Complaint:    Patient presents for PAT for prostatectomy. He was noted to have elevated PSA level, some urinary frequency. He had a prostate biopsy in November, positive for cancer. He has been scheduled now for prostatectomy. Past Medical History    has a past medical history of Back pain, Diabetes mellitus ([address] Dyslipidemia, Elevated PSA, Erectile dysfunction, History of echocardiogram, Hyperlipemia, Hypertension, LVH (left ventricular hypertrophy), Pedal edema, Prostate cancer [address] [address] [facility] Systolic murmur, and Wellness examination. Past Surgical History   has a past surgical history that includes Hernia repair; Prostate biopsy (N/A, 11/17/2020); and US BIOPSY PROSTATE NEEDLE/PUNCH (11/17/2020). Medications  Prior to Admission medications    Medication Sig Start Date End Date Taking? Authorizing Provider   SITagliptin (JANUVIA) 50 MG tablet Take 50 mg by mouth daily   Yes Historical Provider, MD   atenolol (TENORMIN) 50 MG tablet Take 50 mg by mouth daily   Yes Historical Provider, MD   metFORMIN (GLUCOPHAGE) 1000 MG tablet Take 1,000 mg by mouth 2 times daily (with meals)   Yes Historical Provider, MD   amLODIPine (NORVASC) 10 MG tablet Take 10 mg by mouth daily   Yes Historical Provider, MD   hydroCHLOROthiazide (HYDRODIURIL) 25 MG tablet Take 25 mg by mouth daily   Yes Historical Provider, MD   lisinopril (PRINIVIL;ZESTRIL) 40 MG tablet Take 40 mg by mouth daily   Yes Historical Provider, MD   pravastatin (PRAVACHOL) 40 MG tablet Take 40 mg by mouth daily   Yes Historical Provider, MD   sildenafil (VIAGRA) 100 MG tablet Take 100 mg by mouth as needed for Erectile Dysfunction   Yes Historical Provider, MD     Allergies  has No Known Allergies. Family History  family history includes Cancer in his father; Diabetes in his mother; High Blood Pressure in his mother. Social History   reports that he has never smoked. He has never used smokeless tobacco.   reports current alcohol use. reports no history of drug use. Marital Status single  Occupation none    OBJECTIVE:   VITALS:  height is 6' 1\" (1.854 m) and weight is 288 lb (130.6 kg). His temporal temperature is 98.7 °F (37.1 °C). His blood pressure is 124/68 and his pulse is 67. His respiration is 16 and oxygen saturation is 100%. CONSTITUTIONAL:alert & oriented x 3, no acute distress. Very pleasant. SKIN:  Warm and dry, no rashes on exposed areas of skin. HEAD:  Normocephalic, atraumatic. EYES: PERRL. EOMs intact. EARS:  Hearing grossly WNL. NOSE:  Nares patent. No rhinorrhea. MOUTH/THROAT:  benign  NECK:supple, no lymphadenopathy  LUNGS: Clear to auscultation bilaterally, no wheezes. CARDIOVASCULAR: Heart sounds are normal.  Regular rate and rhythm. Soft systolic murmur noted. ABDOMEN: soft, non tender, non distended. EXTREMITIES: trace edema bilateral lower extremities. Testing:   EKG: in [facility] 11/2020  Labs pending: drawn 12/30/2020     IMPRESSIONS:   1. Prostate cancer  2.  has a past medical history of Back pain, Diabetes mellitus ([address] Dyslipidemia, Elevated PSA, Erectile dysfunction, History of echocardiogram (04/2014), Hyperlipemia, Hypertension, LVH (left ventricular hypertrophy), Pedal edema, Prostate cancer ([address] Systolic murmur, and Wellness examination.    PLANS:   1. prostatectomy    SENAIT HUFFMAN PA-C  Electronically signed 12/30/2020 at 12:49 PM

## 2020-12-30 NOTE — H&P
History and Physical    Pt Name: Magda Rainey  MRN: [id number]  YOB: 1960  Date of evaluation: 12/30/2020    SUBJECTIVE:   History of Chief Complaint:    Patient presents for PAT for prostatectomy. He was noted to have elevated PSA level, some urinary frequency. He had a prostate biopsy in November, positive for cancer. He has been scheduled now for prostatectomy. Past Medical History    has a past medical history of Back pain, Diabetes mellitus ([address] Dyslipidemia, Elevated PSA, Erectile dysfunction, History of echocardiogram, Hyperlipemia, Hypertension, LVH (left ventricular hypertrophy), Pedal edema, Prostate cancer ([facility] [address] Systolic murmur, and Wellness examination. Past Surgical History   has a past surgical history that includes Hernia repair; Prostate biopsy (N/A, 11/17/2020); and US BIOPSY PROSTATE NEEDLE/PUNCH (11/17/2020). Medications  Prior to Admission medications    Medication Sig Start Date End Date Taking? Authorizing Provider   SITagliptin (JANUVIA) 50 MG tablet Take 50 mg by mouth daily   Yes Historical Provider, MD   atenolol (TENORMIN) 50 MG tablet Take 50 mg by mouth daily   Yes Historical Provider, MD   metFORMIN (GLUCOPHAGE) 1000 MG tablet Take 1,000 mg by mouth 2 times daily (with meals)   Yes Historical Provider, MD   amLODIPine (NORVASC) 10 MG tablet Take 10 mg by mouth daily   Yes Historical Provider, MD   hydroCHLOROthiazide (HYDRODIURIL) 25 MG tablet Take 25 mg by mouth daily   Yes Historical Provider, MD   lisinopril (PRINIVIL;ZESTRIL) 40 MG tablet Take 40 mg by mouth daily   Yes Historical Provider, MD   pravastatin (PRAVACHOL) 40 MG tablet Take 40 mg by mouth daily   Yes Historical Provider, MD   sildenafil (VIAGRA) 100 MG tablet Take 100 mg by mouth as needed for Erectile Dysfunction   Yes Historical Provider, MD     Allergies  has No Known Allergies.   Family History  family history includes Cancer in his father; Diabetes in his mother; High Blood Pressure in his mother. Social History   reports that he has never smoked. He has never used smokeless tobacco.   reports current alcohol use. reports no history of drug use. Marital Status single  Occupation none    OBJECTIVE:   VITALS:  height is 6' 1\" (1.854 m) and weight is 288 lb (130.6 kg). His temporal temperature is 98.7 °F (37.1 °C). His blood pressure is 124/68 and his pulse is 67. His respiration is 16 and oxygen saturation is 100%. CONSTITUTIONAL:alert & oriented x 3, no acute distress. Very pleasant. SKIN:  Warm and dry, no rashes on exposed areas of skin. HEAD:  Normocephalic, atraumatic. EYES: PERRL. EOMs intact. EARS:  Hearing grossly WNL. NOSE:  Nares patent. No rhinorrhea. MOUTH/THROAT:  benign  NECK:supple, no lymphadenopathy  LUNGS: Clear to auscultation bilaterally, no wheezes. CARDIOVASCULAR: Heart sounds are normal.  Regular rate and rhythm. Soft systolic murmur noted. ABDOMEN: soft, non tender, non distended. EXTREMITIES: trace edema bilateral lower extremities. Testing:   EKG: in [facility] 11/2020  Labs pending: drawn 12/30/2020     IMPRESSIONS:   1. Prostate cancer  2.  has a past medical history of Back pain, Diabetes mellitus ([address] Dyslipidemia, Elevated PSA, Erectile dysfunction, History of echocardiogram (04/2014), Hyperlipemia, Hypertension, LVH (left ventricular hypertrophy), Pedal edema, Prostate cancer ([address] Systolic murmur, and Wellness examination.    PLANS:   1. prostatectomy    SENAIT HUFFMAN PA-C  Electronically signed 12/30/2020 at 12:49 PM

## 2020-12-30 NOTE — PROGRESS NOTES
[address] [address]  Dept: [address] [facility] Urology Office Note - Established    Patient:  Nany Azevedo  YOB: 1960  Date: 12/30/2020    The patient is a 56 y.o. male who presents todayfor evaluation of the following problems:   Chief Complaint   Patient presents with    Other     pre-teaching       HPI    This patient presents today for education regarding his prostate cancer treatment, post-op care and rehabilitation for incontinence and penile health      I met with Nany Azevedo for 30 minutes to discuss prostate cancer treatment including basic surgery information, pre-op instructions, and post-op care: catheter care and cleaning, application of thigh and overnight bag, Kegels exercise to begin now, to stop day of surgery and then to resume after catheter removal, constipation prevention, post op impotence and possible use of vacuum erection pump, activity restrictions and post op testing and appointments. Verbal, written and visual materials presented. Patient verbalized understanding of all materials presented, and handouts given for Kegels, constipation prevention and catheter care. All questions answered. Patient encouraged to call with any further questions or problems. Summary of old records: N/A    Additional History: N/A    Procedures Today: N/A    Urinalysis today:  No results found for this visit on 12/30/20.   Last several PSA's:  Lab Results   Component Value Date    PSA 15.94 (H) 11/06/2019     Last total testosterone:  No results found for: TESTOSTERONE    AUA Symptom Score (12/30/2020):  INCOMPLETE EMPTYING: How often have you had the sensation of not emptying your bladder?: Not at all  FREQUENCY: How often do you have to urinate less than every two hours?: Not at all  INTERMITTENCY: How often have you found you stopped and started again several times when you urinated?: Not at all  URGENCY: How often have you found it difficult to postpone urination?: Not at all  WEAK STREAM: How often have you had a weak urinary stream?: Not at all  STRAINING: How often have you had to strain to start  urination?: Not at all  NOCTURIA: How many times did you typically get up at night to uriniate?: NONE  TOTAL I-PSS SCORE[de-identified] 0  How would you feel if you were to spend the rest of your life with your urinary condition?: Pleased    Last BUN and creatinine:  Lab Results   Component Value Date    BUN 17 11/03/2020     Lab Results   Component Value Date    CREATININE 1.30 (H) 11/03/2020       Additional Lab/Culture results:   [facility]   [address] [facility].    [address], [address] [facility]   [phone]   Fax: [phone]   SURGICAL PATHOLOGY REPORT     Patient Name: Ángel Pate   MR#: [id number]   Specimen #[id number]         Final Diagnosis       SPECIMEN \"A\":  PROSTATE, LEFT LATERAL BASE, CORE NEEDLE BIOPSY:          PROSTATIC ADENOCARCINOMA, ACINAR TYPE,   GRADE GROUP 2 (TIFFANIE SCORE 3+4=7)     TUMOR PRESENT IN 1 OF 2 CORES, MEASURES 2.5 MM AND COMPRISES 18% OF   THE PROSTATE TISSUE           PERINEURAL INVASION PRESENT     BENIGN COLON TISSUE     SPECIMEN \"B\":  PROSTATE, LEFT MEDIAL BASE, CORE NEEDLE BIOPSY:          FOCAL ATYPICAL SMALL ACINAR PROLIFERATION (ASAP),   SUSPICIOUS FOR PROSTATIC ADENOCARCINOMA     SPECIMEN \"C\":  PROSTATE, LEFT LATERAL MID, CORE NEEDLE BIOPSY:          FOCI OF PROSTATIC ADENOCARCINOMA, ACINAR TYPE,   GRADE GROUP 1 (TIFFANIE SCORE 3+3=6)     TUMOR PRESENT IN 1 OF 1 CORE, MEASURES 2.0 MM, AND COMPRISES 15% OF   THE PROSTATE TISSUE     SPECIMEN \"D\":  PROSTATE, LEFT MEDIAL MID, CORE NEEDLE BIOPSY:          FOCI OF PROSTATIC ADENOCARCINOMA, ACINAR TYPE, PRESENT IN 2 OF 5   FRAGMENTED CORES OF THE BIOPSY     CORE #1 WITH PROSTATIC ADENOCARCINOMA, ACINAR TYPE, GRADE GROUP 1   (TIFFANIE SCORE 3+3=6), TUMOR MEASURES 6.5 MM AND COMPRISES 55% OF ONE PROSTATE TISSUE CORE     CORE #2 WITH PROSTATIC ADENOCARCINOMA, ACINAR TYPE, GRADE GROUP 4   (TIFFANIE SCORE 3+5=8), TUMOR MEASURES 1.5 MM, AND COMPRISES 20% OF ONE   PROSTATE TISSUE CORE          THE ENTIRE TUMOR COMPRISES 25% OF THE 5 FRAGMENTED CORES OF THE   PROSTATE TISSUE     SPECIMEN \"E\":  PROSTATE, LEFT LATERAL APEX, CORE NEEDLE BIOPSY:          PROSTATIC ADENOCARCINOMA, ACINAR TYPE,   GRADE GROUP 1 (TIFFANIE SCORE 3+3=6)          TUMOR FOCI PRESENT IN 2 OF 2 CORES, MEASURES 4.5 MM AND 1.5 MM   AND COMPRISES 40% OF THE PROSTATE TISSUE     SPECIMEN \"F\":  PROSTATE, LEFT MEDIAL APEX, CORE NEEDLE BIOPSY:          PROSTATIC ADENOCARCINOMA, ACINAR TYPE,   GRADE GROUP 1 (TIFFANIE SCORE 3+3=6)     TUMOR PRESENT IN ONE OF ONE CORE, MEASURES 2 MM AND COMPRISES 30% OF   THE PROSTATE TISSUE     BENIGN COLON TISSUE     SPECIMEN \"G\":  PROSTATE, RIGHT MEDIAL BASE, CORE NEEDLE BIOPSY:          BENIGN PROSTATE AND COLON TISSUE     SPECIMEN \"H\":  PROSTATE, RIGHT LATERAL BASE, CORE NEEDLE BIOPSY:          PROSTATIC ADENOCARCINOMA, ACINAR TYPE,   GRADE GROUP 1 (TIFFANIE SCORE 3+3=6)     TUMOR PRESENT IN 1 OF 2 CORES, MEASURES 2.5 MM AND COMPRISES 15% OF   THE PROSTATE TISSUE     MULTIFOCAL PERINEURAL INVASION PRESENT     BENIGN COLON TISSUE     SPECIMEN \"I\":  PROSTATE, RIGHT MEDIAL MID, CORE NEEDLE BIOPSY:          PROSTATIC ADENOCARCINOMA, ACINAR TYPE,   GRADE GROUP 1 (TIFFANIE SCORE 3+3=6)     TUMOR FOCI PRESENT IN 2 OF 3 CORES, MEASURES 2 MM AND COMPRISES 15% OF   THE PROSTATE TISSUE     ATYPICAL SMALL ACINAR PROLIFERATION (ASAP) IN THE THIRD CORE     BENIGN COLON TISSUE     SPECIMEN \"J\":  PROSTATE, RIGHT LATERAL MID, CORE NEEDLE BIOPSY:          PROSTATIC ADENOCARCINOMA, ACINAR TYPE,   GRADE GROUP 1 (TIFFANIE SCORE 3+3=6)          TUMOR PRESENT IN 2 OF 2 CORES, MEASURES 5 MM AND COMPRISES 30% OF   THE PROSTATE TISSUE     SPECIMEN \"K\":  PROSTATE, RIGHT MEDIAL APEX, CORE NEEDLE BIOPSY:          PROSTATIC ADENOCARCINOMA, ACINAR TYPE,   GRADE GROUP 1 (TIFFANIE SCORE 3+3=6)     TUMOR FOCI PRESENT IN 3 OF 3 PROSTATE TISSUE CORES, MEASURES 2.0 MM   AND COMPRISES 35% OF THE PROSTATE TISSUE     BENIGN COLON TISSUE     SPECIMEN \"L\":  PROSTATE, RIGHT LATERAL APEX, CORE NEEDLE BIOPSY:          PROSTATIC ADENOCARCINOMA, ACINAR TYPE,   GRADE GROUP 2 (TIFFANIE SCORE 3+4=7)                TUMOR FOCI PRESENT IN 4 OF 4 PROSTATE CORES, MEASURES 9.0 MM AND   COMPRISES 75% OF THE PROSTATE TISSUE       Francoise Benites M.D.   **Electronically Signed Out**         [facility]/11/20/2020     Imaging Reviewed during this Office Visit:  (results were independently reviewed by physician and radiology report verified)    PAST MEDICAL, FAMILY AND SOCIAL HISTORY UPDATE:  Past Medical History:   Diagnosis Date    Back pain     Diabetes mellitus ([address]     Dyslipidemia     Elevated PSA     Erectile dysfunction     Hyperlipemia     Hypertension     LVH (left ventricular hypertrophy)     Pedal edema     Systolic murmur      Past Surgical History:   Procedure Laterality Date    HERNIA REPAIR      as baby    HERNIA REPAIR      PROSTATE BIOPSY N/A 11/17/2020    PROSTATE BIOPSY W/ US & TECH TO OR performed by Maren James MD at [facility]  11/17/2020    US PROSTATE NEEDLE PUNCH 11/17/2020 [facility] ULTRASOUND     History reviewed. No pertinent family history.   Outpatient Medications Marked as Taking for the 12/30/20 encounter (Office Visit) with Arvel Kayser, APRN - CNP   Medication Sig Dispense Refill    SITagliptin (JANUVIA) 50 MG tablet Take 50 mg by mouth daily      atenolol (TENORMIN) 50 MG tablet Take 50 mg by mouth daily      metFORMIN (GLUCOPHAGE) 1000 MG tablet Take 1,000 mg by mouth 2 times daily (with meals)      amLODIPine (NORVASC) 10 MG tablet Take 10 mg by mouth daily      hydroCHLOROthiazide (HYDRODIURIL) 25 MG tablet Take 25 mg by mouth daily      lisinopril (PRINIVIL;ZESTRIL) 40 MG tablet Take 40 mg by mouth daily      bowel cleanse as ordered- clear liquids the day before surgery    3. Avoid post-op constipation as discussed    4. Follow-up Cystograms scheduled 1 week after surgery, start antibiotic the day before the cystogram on the 19th, take the day off and complete the day after, call the office if you did not get a prescription for antibiotic at discharge. After cystogram, come directly to office for post-op appt    5. Call with questions or concerns    6. No pump ordered per pt request  Return if symptoms worsen or fail to improve, for as scheduled post op visit. Prescriptions Ordered:  No orders of the defined types were placed in this encounter. Orders Placed:  No orders of the defined types were placed in this encounter. MAURICIO Fleming CNP    reviewed and Agree with the ROS entered by the MA.

## 2020-12-31 LAB
CULTURE: NO GROWTH
Lab: NORMAL
SPECIMEN DESCRIPTION: NORMAL

## 2021-01-09 ENCOUNTER — HOSPITAL ENCOUNTER (OUTPATIENT)
Dept: LAB | Age: 61
Setting detail: SPECIMEN
Discharge: HOME | End: 2021-01-09
Payer: COMMERCIAL

## 2021-01-09 DIAGNOSIS — Z20.822 COVID-19 RULED OUT BY LABORATORY TESTING: Primary | ICD-10-CM

## 2021-01-09 PROCEDURE — U0003 INFECTIOUS AGENT DETECTION BY NUCLEIC ACID (DNA OR RNA); SEVERE ACUTE RESPIRATORY SYNDROME CORONAVIRUS 2 (SARS-COV-2) (CORONAVIRUS DISEASE [COVID-19]), AMPLIFIED PROBE TECHNIQUE, MAKING USE OF HIGH THROUGHPUT TECHNOLOGIES AS DESCRIBED BY CMS-2020-01-R: HCPCS

## 2021-01-09 PROCEDURE — U0005 INFEC AGEN DETEC AMPLI PROBE: HCPCS

## 2021-01-11 LAB
SARS-COV-2, RAPID: NORMAL
SARS-COV-2: NORMAL
SARS-COV-2: NOT DETECTED
SOURCE: NORMAL

## 2021-01-12 ENCOUNTER — TELEPHONE (OUTPATIENT)
Dept: UROLOGY | Age: 61
End: 2021-01-12

## 2021-01-12 ENCOUNTER — TELEPHONE (OUTPATIENT)
Dept: PRIMARY CARE CLINIC | Age: 61
End: 2021-01-12

## 2021-01-12 NOTE — TELEPHONE ENCOUNTER
Left message with patient to arrive at [facility] on 1/13/2021 for surgery at 9:30am for a 11:30am procedure time.

## 2021-01-13 ENCOUNTER — ANESTHESIA (OUTPATIENT)
Dept: OPERATING ROOM | Age: 61
DRG: 707 | End: 2021-01-13
Payer: COMMERCIAL

## 2021-01-13 ENCOUNTER — ANESTHESIA EVENT (OUTPATIENT)
Dept: OPERATING ROOM | Age: 61
DRG: 707 | End: 2021-01-13
Payer: COMMERCIAL

## 2021-01-13 ENCOUNTER — HOSPITAL ENCOUNTER (INPATIENT)
Age: 61
LOS: 2 days | Discharge: HOME | DRG: 707 | End: 2021-01-15
Attending: UROLOGY | Admitting: UROLOGY
Payer: COMMERCIAL

## 2021-01-13 VITALS — TEMPERATURE: 96.3 F | SYSTOLIC BLOOD PRESSURE: 83 MMHG | DIASTOLIC BLOOD PRESSURE: 44 MMHG | OXYGEN SATURATION: 98 %

## 2021-01-13 DIAGNOSIS — C61 PROSTATE CANCER (HCC): Primary | ICD-10-CM

## 2021-01-13 DIAGNOSIS — N17.9 ACUTE KIDNEY INJURY (HCC): ICD-10-CM

## 2021-01-13 LAB
ANION GAP SERPL CALCULATED.3IONS-SCNC: 10 MMOL/L (ref 9–17)
BUN BLDV-MCNC: 42 MG/DL (ref 8–23)
BUN/CREAT BLD: ABNORMAL (ref 9–20)
CALCIUM SERPL-MCNC: 8.6 MG/DL (ref 8.6–10.4)
CHLORIDE BLD-SCNC: 103 MMOL/L (ref 98–107)
CO2: 21 MMOL/L (ref 20–31)
CREAT SERPL-MCNC: 2.31 MG/DL (ref 0.7–1.2)
GFR AFRICAN AMERICAN: 35 ML/MIN
GFR NON-AFRICAN AMERICAN: 24 ML/MIN
GFR NON-AFRICAN AMERICAN: 29 ML/MIN
GFR SERPL CREATININE-BSD FRML MDRD: 29 ML/MIN
GFR SERPL CREATININE-BSD FRML MDRD: ABNORMAL ML/MIN/{1.73_M2}
GLUCOSE BLD-MCNC: 136 MG/DL (ref 74–100)
GLUCOSE BLD-MCNC: 170 MG/DL (ref 70–99)
GLUCOSE BLD-MCNC: 171 MG/DL (ref 75–110)
GLUCOSE BLD-MCNC: 184 MG/DL (ref 75–110)
HCT VFR BLD CALC: 33.6 % (ref 40.7–50.3)
HEMOGLOBIN: 10.8 G/DL (ref 13–17)
MCH RBC QN AUTO: 30.3 PG (ref 25.2–33.5)
MCHC RBC AUTO-ENTMCNC: 32.1 G/DL (ref 28.4–34.8)
MCV RBC AUTO: 94.1 FL (ref 82.6–102.9)
NRBC AUTOMATED: 0 PER 100 WBC
PDW BLD-RTO: 13.2 % (ref 11.8–14.4)
PLATELET # BLD: 321 K/UL (ref 138–453)
PMV BLD AUTO: 10.3 FL (ref 8.1–13.5)
POC CHLORIDE: 104 MMOL/L (ref 98–107)
POC CREATININE: 2.69 MG/DL (ref 0.51–1.19)
POC HEMATOCRIT: 38 % (ref 41–53)
POC HEMOGLOBIN: 12.8 G/DL (ref 13.5–17.5)
POC IONIZED CALCIUM: 1.16 MMOL/L (ref 1.15–1.33)
POC LACTIC ACID: 1.21 MMOL/L (ref 0.56–1.39)
POC POTASSIUM: 3.7 MMOL/L (ref 3.5–4.5)
POC SODIUM: 137 MMOL/L (ref 138–146)
POTASSIUM SERPL-SCNC: 4.1 MMOL/L (ref 3.7–5.3)
RBC # BLD: 3.57 M/UL (ref 4.21–5.77)
SODIUM BLD-SCNC: 134 MMOL/L (ref 135–144)
WBC # BLD: 14.7 K/UL (ref 3.5–11.3)

## 2021-01-13 PROCEDURE — 2500000003 HC RX 250 WO HCPCS: Performed by: NURSE ANESTHETIST, CERTIFIED REGISTERED

## 2021-01-13 PROCEDURE — 3600000019 HC SURGERY ROBOT ADDTL 15MIN: Performed by: UROLOGY

## 2021-01-13 PROCEDURE — 6360000002 HC RX W HCPCS: Performed by: PHYSICIAN ASSISTANT

## 2021-01-13 PROCEDURE — 2580000003 HC RX 258: Performed by: UROLOGY

## 2021-01-13 PROCEDURE — 85014 HEMATOCRIT: CPT

## 2021-01-13 PROCEDURE — 83605 ASSAY OF LACTIC ACID: CPT

## 2021-01-13 PROCEDURE — 2580000003 HC RX 258: Performed by: NURSE ANESTHETIST, CERTIFIED REGISTERED

## 2021-01-13 PROCEDURE — 80048 BASIC METABOLIC PNL TOTAL CA: CPT

## 2021-01-13 PROCEDURE — 88309 TISSUE EXAM BY PATHOLOGIST: CPT

## 2021-01-13 PROCEDURE — 88344 IMHCHEM/IMCYTCHM EA MLT ANTB: CPT

## 2021-01-13 PROCEDURE — 2780000010 HC IMPLANT OTHER: Performed by: UROLOGY

## 2021-01-13 PROCEDURE — 2580000003 HC RX 258: Performed by: ANESTHESIOLOGY

## 2021-01-13 PROCEDURE — 7100000001 HC PACU RECOVERY - ADDTL 15 MIN: Performed by: UROLOGY

## 2021-01-13 PROCEDURE — 1200000000 HC SEMI PRIVATE

## 2021-01-13 PROCEDURE — 87086 URINE CULTURE/COLONY COUNT: CPT

## 2021-01-13 PROCEDURE — 2580000003 HC RX 258: Performed by: STUDENT IN AN ORGANIZED HEALTH CARE EDUCATION/TRAINING PROGRAM

## 2021-01-13 PROCEDURE — 84295 ASSAY OF SERUM SODIUM: CPT

## 2021-01-13 PROCEDURE — G0378 HOSPITAL OBSERVATION PER HR: HCPCS

## 2021-01-13 PROCEDURE — 6360000002 HC RX W HCPCS: Performed by: ANESTHESIOLOGY

## 2021-01-13 PROCEDURE — 2709999900 HC NON-CHARGEABLE SUPPLY: Performed by: UROLOGY

## 2021-01-13 PROCEDURE — 82330 ASSAY OF CALCIUM: CPT

## 2021-01-13 PROCEDURE — 6360000002 HC RX W HCPCS: Performed by: NURSE ANESTHETIST, CERTIFIED REGISTERED

## 2021-01-13 PROCEDURE — 6360000002 HC RX W HCPCS: Performed by: STUDENT IN AN ORGANIZED HEALTH CARE EDUCATION/TRAINING PROGRAM

## 2021-01-13 PROCEDURE — 2500000003 HC RX 250 WO HCPCS: Performed by: UROLOGY

## 2021-01-13 PROCEDURE — 6370000000 HC RX 637 (ALT 250 FOR IP): Performed by: STUDENT IN AN ORGANIZED HEALTH CARE EDUCATION/TRAINING PROGRAM

## 2021-01-13 PROCEDURE — S2900 ROBOTIC SURGICAL SYSTEM: HCPCS | Performed by: UROLOGY

## 2021-01-13 PROCEDURE — 3700000000 HC ANESTHESIA ATTENDED CARE: Performed by: UROLOGY

## 2021-01-13 PROCEDURE — 85027 COMPLETE CBC AUTOMATED: CPT

## 2021-01-13 PROCEDURE — 82947 ASSAY GLUCOSE BLOOD QUANT: CPT

## 2021-01-13 PROCEDURE — 0VT34ZZ RESECTION OF BILATERAL SEMINAL VESICLES, PERCUTANEOUS ENDOSCOPIC APPROACH: ICD-10-PCS | Performed by: UROLOGY

## 2021-01-13 PROCEDURE — 82565 ASSAY OF CREATININE: CPT

## 2021-01-13 PROCEDURE — 82435 ASSAY OF BLOOD CHLORIDE: CPT

## 2021-01-13 PROCEDURE — 8E0W4CZ ROBOTIC ASSISTED PROCEDURE OF TRUNK REGION, PERCUTANEOUS ENDOSCOPIC APPROACH: ICD-10-PCS | Performed by: UROLOGY

## 2021-01-13 PROCEDURE — 3700000001 HC ADD 15 MINUTES (ANESTHESIA): Performed by: UROLOGY

## 2021-01-13 PROCEDURE — 3600000009 HC SURGERY ROBOT BASE: Performed by: UROLOGY

## 2021-01-13 PROCEDURE — 7100000000 HC PACU RECOVERY - FIRST 15 MIN: Performed by: UROLOGY

## 2021-01-13 PROCEDURE — 0VT04ZZ RESECTION OF PROSTATE, PERCUTANEOUS ENDOSCOPIC APPROACH: ICD-10-PCS | Performed by: UROLOGY

## 2021-01-13 PROCEDURE — 07BC4ZX EXCISION OF PELVIS LYMPHATIC, PERCUTANEOUS ENDOSCOPIC APPROACH, DIAGNOSTIC: ICD-10-PCS | Performed by: UROLOGY

## 2021-01-13 PROCEDURE — 84132 ASSAY OF SERUM POTASSIUM: CPT

## 2021-01-13 PROCEDURE — 88307 TISSUE EXAM BY PATHOLOGIST: CPT

## 2021-01-13 PROCEDURE — 6370000000 HC RX 637 (ALT 250 FOR IP): Performed by: UROLOGY

## 2021-01-13 DEVICE — Z DUP USE 2641840 CLIP INT L POLYMER LOK LIG HEM O LOK: Type: IMPLANTABLE DEVICE | Status: FUNCTIONAL

## 2021-01-13 RX ORDER — SODIUM CHLORIDE 9 MG/ML
INJECTION, SOLUTION INTRAVENOUS CONTINUOUS
Status: DISCONTINUED | OUTPATIENT
Start: 2021-01-13 | End: 2021-01-15 | Stop reason: HOSPADM

## 2021-01-13 RX ORDER — SODIUM CHLORIDE, SODIUM LACTATE, POTASSIUM CHLORIDE, CALCIUM CHLORIDE 600; 310; 30; 20 MG/100ML; MG/100ML; MG/100ML; MG/100ML
INJECTION, SOLUTION INTRAVENOUS CONTINUOUS
Status: CANCELLED | OUTPATIENT
Start: 2021-01-13

## 2021-01-13 RX ORDER — ACETAMINOPHEN 325 MG/1
650 TABLET ORAL EVERY 6 HOURS SCHEDULED
Status: DISCONTINUED | OUTPATIENT
Start: 2021-01-13 | End: 2021-01-15 | Stop reason: HOSPADM

## 2021-01-13 RX ORDER — POLYETHYLENE GLYCOL 3350 17 G/17G
17 POWDER, FOR SOLUTION ORAL DAILY PRN
Qty: 510 G | Refills: 0 | Status: SHIPPED | OUTPATIENT
Start: 2021-01-13 | End: 2021-02-12

## 2021-01-13 RX ORDER — MIDAZOLAM HYDROCHLORIDE 2 MG/2ML
1 INJECTION, SOLUTION INTRAMUSCULAR; INTRAVENOUS EVERY 10 MIN PRN
Status: CANCELLED | OUTPATIENT
Start: 2021-01-13

## 2021-01-13 RX ORDER — PROPOFOL 10 MG/ML
INJECTION, EMULSION INTRAVENOUS PRN
Status: DISCONTINUED | OUTPATIENT
Start: 2021-01-13 | End: 2021-01-13 | Stop reason: SDUPTHER

## 2021-01-13 RX ORDER — OXYCODONE HYDROCHLORIDE AND ACETAMINOPHEN 5; 325 MG/1; MG/1
1 TABLET ORAL EVERY 6 HOURS PRN
Qty: 20 TABLET | Refills: 0 | Status: SHIPPED | OUTPATIENT
Start: 2021-01-13 | End: 2021-01-18

## 2021-01-13 RX ORDER — WATER 1000 ML/1000ML
INJECTION, SOLUTION INTRAVENOUS PRN
Status: DISCONTINUED | OUTPATIENT
Start: 2021-01-13 | End: 2021-01-13 | Stop reason: ALTCHOICE

## 2021-01-13 RX ORDER — POTASSIUM CHLORIDE 20 MEQ/1
40 TABLET, EXTENDED RELEASE ORAL PRN
Status: DISCONTINUED | OUTPATIENT
Start: 2021-01-13 | End: 2021-01-15 | Stop reason: HOSPADM

## 2021-01-13 RX ORDER — FENTANYL CITRATE 50 UG/ML
INJECTION, SOLUTION INTRAMUSCULAR; INTRAVENOUS PRN
Status: DISCONTINUED | OUTPATIENT
Start: 2021-01-13 | End: 2021-01-13 | Stop reason: SDUPTHER

## 2021-01-13 RX ORDER — DEXAMETHASONE SODIUM PHOSPHATE 4 MG/ML
INJECTION, SOLUTION INTRA-ARTICULAR; INTRALESIONAL; INTRAMUSCULAR; INTRAVENOUS; SOFT TISSUE PRN
Status: DISCONTINUED | OUTPATIENT
Start: 2021-01-13 | End: 2021-01-13 | Stop reason: SDUPTHER

## 2021-01-13 RX ORDER — FENTANYL CITRATE 50 UG/ML
25 INJECTION, SOLUTION INTRAMUSCULAR; INTRAVENOUS EVERY 5 MIN PRN
Status: DISCONTINUED | OUTPATIENT
Start: 2021-01-13 | End: 2021-01-13 | Stop reason: HOSPADM

## 2021-01-13 RX ORDER — NICOTINE POLACRILEX 4 MG
15 LOZENGE BUCCAL PRN
Status: DISCONTINUED | OUTPATIENT
Start: 2021-01-13 | End: 2021-01-15 | Stop reason: HOSPADM

## 2021-01-13 RX ORDER — PRAVASTATIN SODIUM 20 MG
40 TABLET ORAL DAILY
Status: DISCONTINUED | OUTPATIENT
Start: 2021-01-14 | End: 2021-01-15 | Stop reason: HOSPADM

## 2021-01-13 RX ORDER — NEOSTIGMINE METHYLSULFATE 5 MG/5 ML
SYRINGE (ML) INTRAVENOUS PRN
Status: DISCONTINUED | OUTPATIENT
Start: 2021-01-13 | End: 2021-01-13 | Stop reason: SDUPTHER

## 2021-01-13 RX ORDER — ATENOLOL 50 MG/1
50 TABLET ORAL DAILY
Status: DISCONTINUED | OUTPATIENT
Start: 2021-01-14 | End: 2021-01-14

## 2021-01-13 RX ORDER — LIDOCAINE HYDROCHLORIDE 10 MG/ML
1 INJECTION, SOLUTION EPIDURAL; INFILTRATION; INTRACAUDAL; PERINEURAL
Status: CANCELLED | OUTPATIENT
Start: 2021-01-13 | End: 2021-01-13

## 2021-01-13 RX ORDER — LIDOCAINE HYDROCHLORIDE 10 MG/ML
INJECTION, SOLUTION EPIDURAL; INFILTRATION; INTRACAUDAL; PERINEURAL PRN
Status: DISCONTINUED | OUTPATIENT
Start: 2021-01-13 | End: 2021-01-13 | Stop reason: SDUPTHER

## 2021-01-13 RX ORDER — FENTANYL CITRATE 50 UG/ML
25 INJECTION, SOLUTION INTRAMUSCULAR; INTRAVENOUS EVERY 5 MIN PRN
Status: CANCELLED | OUTPATIENT
Start: 2021-01-13

## 2021-01-13 RX ORDER — CIPROFLOXACIN 500 MG/1
500 TABLET, FILM COATED ORAL 2 TIMES DAILY
Qty: 6 TABLET | Refills: 0 | Status: SHIPPED | OUTPATIENT
Start: 2021-01-13 | End: 2021-01-16

## 2021-01-13 RX ORDER — SODIUM CHLORIDE 0.9 % (FLUSH) 0.9 %
10 SYRINGE (ML) INJECTION EVERY 12 HOURS SCHEDULED
Status: DISCONTINUED | OUTPATIENT
Start: 2021-01-13 | End: 2021-01-15 | Stop reason: HOSPADM

## 2021-01-13 RX ORDER — ROCURONIUM BROMIDE 10 MG/ML
INJECTION, SOLUTION INTRAVENOUS PRN
Status: DISCONTINUED | OUTPATIENT
Start: 2021-01-13 | End: 2021-01-13 | Stop reason: SDUPTHER

## 2021-01-13 RX ORDER — POLYETHYLENE GLYCOL 3350 17 G/17G
17 POWDER, FOR SOLUTION ORAL DAILY
Status: DISCONTINUED | OUTPATIENT
Start: 2021-01-14 | End: 2021-01-15 | Stop reason: HOSPADM

## 2021-01-13 RX ORDER — PHENYLEPHRINE HCL IN 0.9% NACL 1 MG/10 ML
SYRINGE (ML) INTRAVENOUS PRN
Status: DISCONTINUED | OUTPATIENT
Start: 2021-01-13 | End: 2021-01-13 | Stop reason: SDUPTHER

## 2021-01-13 RX ORDER — OXYCODONE HYDROCHLORIDE 5 MG/1
10 TABLET ORAL EVERY 4 HOURS PRN
Status: DISCONTINUED | OUTPATIENT
Start: 2021-01-13 | End: 2021-01-15 | Stop reason: HOSPADM

## 2021-01-13 RX ORDER — ULTRASOUND COUPLING MEDIUM
GEL (GRAM) TOPICAL PRN
Status: DISCONTINUED | OUTPATIENT
Start: 2021-01-13 | End: 2021-01-13 | Stop reason: ALTCHOICE

## 2021-01-13 RX ORDER — DEXTROSE MONOHYDRATE 25 G/50ML
12.5 INJECTION, SOLUTION INTRAVENOUS PRN
Status: DISCONTINUED | OUTPATIENT
Start: 2021-01-13 | End: 2021-01-15 | Stop reason: HOSPADM

## 2021-01-13 RX ORDER — ONDANSETRON 2 MG/ML
INJECTION INTRAMUSCULAR; INTRAVENOUS PRN
Status: DISCONTINUED | OUTPATIENT
Start: 2021-01-13 | End: 2021-01-13 | Stop reason: SDUPTHER

## 2021-01-13 RX ORDER — FENTANYL CITRATE 50 UG/ML
50 INJECTION, SOLUTION INTRAMUSCULAR; INTRAVENOUS EVERY 5 MIN PRN
Status: DISCONTINUED | OUTPATIENT
Start: 2021-01-13 | End: 2021-01-13 | Stop reason: HOSPADM

## 2021-01-13 RX ORDER — AMLODIPINE BESYLATE 10 MG/1
10 TABLET ORAL DAILY
Status: DISCONTINUED | OUTPATIENT
Start: 2021-01-14 | End: 2021-01-14

## 2021-01-13 RX ORDER — HEPARIN SODIUM 5000 [USP'U]/ML
5000 INJECTION, SOLUTION INTRAVENOUS; SUBCUTANEOUS ONCE
Status: COMPLETED | OUTPATIENT
Start: 2021-01-13 | End: 2021-01-13

## 2021-01-13 RX ORDER — SODIUM CHLORIDE 0.9 % (FLUSH) 0.9 %
10 SYRINGE (ML) INJECTION PRN
Status: DISCONTINUED | OUTPATIENT
Start: 2021-01-13 | End: 2021-01-15 | Stop reason: HOSPADM

## 2021-01-13 RX ORDER — EPHEDRINE SULFATE/0.9% NACL/PF 50 MG/5 ML
SYRINGE (ML) INTRAVENOUS PRN
Status: DISCONTINUED | OUTPATIENT
Start: 2021-01-13 | End: 2021-01-13 | Stop reason: SDUPTHER

## 2021-01-13 RX ORDER — SODIUM CHLORIDE, SODIUM LACTATE, POTASSIUM CHLORIDE, CALCIUM CHLORIDE 600; 310; 30; 20 MG/100ML; MG/100ML; MG/100ML; MG/100ML
1000 INJECTION, SOLUTION INTRAVENOUS CONTINUOUS
Status: DISCONTINUED | OUTPATIENT
Start: 2021-01-13 | End: 2021-01-13

## 2021-01-13 RX ORDER — SODIUM CHLORIDE 0.9 % (FLUSH) 0.9 %
10 SYRINGE (ML) INJECTION PRN
Status: CANCELLED | OUTPATIENT
Start: 2021-01-13

## 2021-01-13 RX ORDER — MEPERIDINE HYDROCHLORIDE 50 MG/ML
12.5 INJECTION INTRAMUSCULAR; INTRAVENOUS; SUBCUTANEOUS EVERY 5 MIN PRN
Status: DISCONTINUED | OUTPATIENT
Start: 2021-01-13 | End: 2021-01-13 | Stop reason: HOSPADM

## 2021-01-13 RX ORDER — ALOGLIPTIN 12.5 MG/1
12.5 TABLET, FILM COATED ORAL DAILY
Status: DISCONTINUED | OUTPATIENT
Start: 2021-01-14 | End: 2021-01-15 | Stop reason: HOSPADM

## 2021-01-13 RX ORDER — GLYCOPYRROLATE 1 MG/5 ML
SYRINGE (ML) INTRAVENOUS PRN
Status: DISCONTINUED | OUTPATIENT
Start: 2021-01-13 | End: 2021-01-13 | Stop reason: SDUPTHER

## 2021-01-13 RX ORDER — MORPHINE SULFATE 2 MG/ML
2 INJECTION, SOLUTION INTRAMUSCULAR; INTRAVENOUS EVERY 4 HOURS PRN
Status: DISCONTINUED | OUTPATIENT
Start: 2021-01-13 | End: 2021-01-15 | Stop reason: HOSPADM

## 2021-01-13 RX ORDER — ONDANSETRON 2 MG/ML
4 INJECTION INTRAMUSCULAR; INTRAVENOUS EVERY 6 HOURS PRN
Status: DISCONTINUED | OUTPATIENT
Start: 2021-01-13 | End: 2021-01-15 | Stop reason: HOSPADM

## 2021-01-13 RX ORDER — SODIUM CHLORIDE 0.9 % (FLUSH) 0.9 %
10 SYRINGE (ML) INJECTION EVERY 12 HOURS SCHEDULED
Status: CANCELLED | OUTPATIENT
Start: 2021-01-13

## 2021-01-13 RX ORDER — MAGNESIUM HYDROXIDE 1200 MG/15ML
LIQUID ORAL CONTINUOUS PRN
Status: COMPLETED | OUTPATIENT
Start: 2021-01-13 | End: 2021-01-13

## 2021-01-13 RX ORDER — OXYCODONE HYDROCHLORIDE 5 MG/1
5 TABLET ORAL EVERY 4 HOURS PRN
Status: DISCONTINUED | OUTPATIENT
Start: 2021-01-13 | End: 2021-01-15 | Stop reason: HOSPADM

## 2021-01-13 RX ORDER — POTASSIUM CHLORIDE 7.45 MG/ML
10 INJECTION INTRAVENOUS PRN
Status: DISCONTINUED | OUTPATIENT
Start: 2021-01-13 | End: 2021-01-15 | Stop reason: HOSPADM

## 2021-01-13 RX ORDER — BUPIVACAINE HYDROCHLORIDE AND EPINEPHRINE 5; 5 MG/ML; UG/ML
INJECTION, SOLUTION PERINEURAL PRN
Status: DISCONTINUED | OUTPATIENT
Start: 2021-01-13 | End: 2021-01-13 | Stop reason: ALTCHOICE

## 2021-01-13 RX ORDER — SODIUM CHLORIDE 9 MG/ML
INJECTION, SOLUTION INTRAVENOUS CONTINUOUS PRN
Status: DISCONTINUED | OUTPATIENT
Start: 2021-01-13 | End: 2021-01-13 | Stop reason: SDUPTHER

## 2021-01-13 RX ORDER — DEXTROSE MONOHYDRATE 50 MG/ML
100 INJECTION, SOLUTION INTRAVENOUS PRN
Status: DISCONTINUED | OUTPATIENT
Start: 2021-01-13 | End: 2021-01-15 | Stop reason: HOSPADM

## 2021-01-13 RX ADMIN — Medication 100 MCG: at 12:37

## 2021-01-13 RX ADMIN — Medication 10 MG: at 12:07

## 2021-01-13 RX ADMIN — FENTANYL CITRATE 50 MCG: 50 INJECTION, SOLUTION INTRAMUSCULAR; INTRAVENOUS at 12:16

## 2021-01-13 RX ADMIN — Medication 10 MG: at 12:44

## 2021-01-13 RX ADMIN — Medication 3 G: at 20:12

## 2021-01-13 RX ADMIN — PHENYLEPHRINE HYDROCHLORIDE 50 MCG/MIN: 10 INJECTION INTRAVENOUS at 12:57

## 2021-01-13 RX ADMIN — LIDOCAINE HYDROCHLORIDE 50 MG: 10 INJECTION, SOLUTION EPIDURAL; INFILTRATION; INTRACAUDAL; PERINEURAL at 11:41

## 2021-01-13 RX ADMIN — DEXAMETHASONE SODIUM PHOSPHATE 8 MG: 4 INJECTION, SOLUTION INTRAMUSCULAR; INTRAVENOUS at 11:52

## 2021-01-13 RX ADMIN — Medication 0.2 MG: at 11:56

## 2021-01-13 RX ADMIN — PROPOFOL 200 MG: 10 INJECTION, EMULSION INTRAVENOUS at 11:42

## 2021-01-13 RX ADMIN — Medication 0.8 MG: at 15:06

## 2021-01-13 RX ADMIN — Medication 100 MCG: at 12:01

## 2021-01-13 RX ADMIN — HEPARIN SODIUM 5000 UNITS: 5000 INJECTION INTRAVENOUS; SUBCUTANEOUS at 09:45

## 2021-01-13 RX ADMIN — Medication 10 ML: at 20:15

## 2021-01-13 RX ADMIN — SODIUM CHLORIDE: 9 INJECTION, SOLUTION INTRAVENOUS at 11:53

## 2021-01-13 RX ADMIN — ROCURONIUM BROMIDE 40 MG: 10 INJECTION INTRAVENOUS at 12:17

## 2021-01-13 RX ADMIN — ROCURONIUM BROMIDE 50 MG: 10 INJECTION INTRAVENOUS at 11:42

## 2021-01-13 RX ADMIN — ROCURONIUM BROMIDE 10 MG: 10 INJECTION INTRAVENOUS at 14:38

## 2021-01-13 RX ADMIN — ONDANSETRON 4 MG: 2 INJECTION INTRAMUSCULAR; INTRAVENOUS at 15:12

## 2021-01-13 RX ADMIN — INSULIN LISPRO 1 UNITS: 100 INJECTION, SOLUTION INTRAVENOUS; SUBCUTANEOUS at 20:14

## 2021-01-13 RX ADMIN — ROCURONIUM BROMIDE 10 MG: 10 INJECTION INTRAVENOUS at 14:03

## 2021-01-13 RX ADMIN — FENTANYL CITRATE 25 MCG: 50 INJECTION, SOLUTION INTRAMUSCULAR; INTRAVENOUS at 15:14

## 2021-01-13 RX ADMIN — Medication 10 MG: at 12:30

## 2021-01-13 RX ADMIN — SODIUM CHLORIDE: 9 INJECTION, SOLUTION INTRAVENOUS at 17:54

## 2021-01-13 RX ADMIN — ACETAMINOPHEN 650 MG: 325 TABLET ORAL at 17:54

## 2021-01-13 RX ADMIN — SODIUM CHLORIDE, POTASSIUM CHLORIDE, SODIUM LACTATE AND CALCIUM CHLORIDE 1000 ML: 600; 310; 30; 20 INJECTION, SOLUTION INTRAVENOUS at 09:46

## 2021-01-13 RX ADMIN — CEFAZOLIN 3 G: 10 INJECTION, POWDER, FOR SOLUTION INTRAVENOUS at 12:03

## 2021-01-13 RX ADMIN — Medication 4 MG: at 15:06

## 2021-01-13 RX ADMIN — OXYCODONE HYDROCHLORIDE 10 MG: 5 TABLET ORAL at 20:12

## 2021-01-13 RX ADMIN — FENTANYL CITRATE 50 MCG: 50 INJECTION, SOLUTION INTRAMUSCULAR; INTRAVENOUS at 15:35

## 2021-01-13 RX ADMIN — FENTANYL CITRATE 25 MCG: 50 INJECTION, SOLUTION INTRAMUSCULAR; INTRAVENOUS at 15:17

## 2021-01-13 RX ADMIN — Medication 150 MCG: at 14:56

## 2021-01-13 RX ADMIN — Medication 10 MG: at 12:05

## 2021-01-13 RX ADMIN — ROCURONIUM BROMIDE 10 MG: 10 INJECTION INTRAVENOUS at 12:46

## 2021-01-13 RX ADMIN — FENTANYL CITRATE 50 MCG: 50 INJECTION, SOLUTION INTRAMUSCULAR; INTRAVENOUS at 15:30

## 2021-01-13 RX ADMIN — FENTANYL CITRATE 100 MCG: 50 INJECTION, SOLUTION INTRAMUSCULAR; INTRAVENOUS at 11:41

## 2021-01-13 RX ADMIN — ROCURONIUM BROMIDE 20 MG: 10 INJECTION INTRAVENOUS at 13:21

## 2021-01-13 ASSESSMENT — PULMONARY FUNCTION TESTS
PIF_VALUE: 30
PIF_VALUE: 31
PIF_VALUE: 16
PIF_VALUE: 16
PIF_VALUE: 31
PIF_VALUE: 30
PIF_VALUE: 31
PIF_VALUE: 21
PIF_VALUE: 23
PIF_VALUE: 37
PIF_VALUE: 16
PIF_VALUE: 16
PIF_VALUE: 29
PIF_VALUE: 31
PIF_VALUE: 28
PIF_VALUE: 31
PIF_VALUE: 31
PIF_VALUE: 20
PIF_VALUE: 30
PIF_VALUE: 31
PIF_VALUE: 16
PIF_VALUE: 34
PIF_VALUE: 32
PIF_VALUE: 35
PIF_VALUE: 31
PIF_VALUE: 30
PIF_VALUE: 31
PIF_VALUE: 15
PIF_VALUE: 31
PIF_VALUE: 34
PIF_VALUE: 31
PIF_VALUE: 23
PIF_VALUE: 16
PIF_VALUE: 31
PIF_VALUE: 16
PIF_VALUE: 31
PIF_VALUE: 33
PIF_VALUE: 31
PIF_VALUE: 36
PIF_VALUE: 37
PIF_VALUE: 16
PIF_VALUE: 35
PIF_VALUE: 2
PIF_VALUE: 31
PIF_VALUE: 31
PIF_VALUE: 23
PIF_VALUE: 22
PIF_VALUE: 30
PIF_VALUE: 32
PIF_VALUE: 32
PIF_VALUE: 31
PIF_VALUE: 16
PIF_VALUE: 16
PIF_VALUE: 24
PIF_VALUE: 31
PIF_VALUE: 18
PIF_VALUE: 36
PIF_VALUE: 36
PIF_VALUE: 30
PIF_VALUE: 35
PIF_VALUE: 33
PIF_VALUE: 8
PIF_VALUE: 15
PIF_VALUE: 20
PIF_VALUE: 31
PIF_VALUE: 31
PIF_VALUE: 22
PIF_VALUE: 19
PIF_VALUE: 16
PIF_VALUE: 17
PIF_VALUE: 30
PIF_VALUE: 19
PIF_VALUE: 23
PIF_VALUE: 0
PIF_VALUE: 0
PIF_VALUE: 35
PIF_VALUE: 31
PIF_VALUE: 16
PIF_VALUE: 30
PIF_VALUE: 36
PIF_VALUE: 32
PIF_VALUE: 23
PIF_VALUE: 36
PIF_VALUE: 30
PIF_VALUE: 31
PIF_VALUE: 2
PIF_VALUE: 31
PIF_VALUE: 32
PIF_VALUE: 34
PIF_VALUE: 31
PIF_VALUE: 16
PIF_VALUE: 30
PIF_VALUE: 36
PIF_VALUE: 31
PIF_VALUE: 23
PIF_VALUE: 16
PIF_VALUE: 3
PIF_VALUE: 31
PIF_VALUE: 16
PIF_VALUE: 30
PIF_VALUE: 21
PIF_VALUE: 31
PIF_VALUE: 35
PIF_VALUE: 35
PIF_VALUE: 29
PIF_VALUE: 34
PIF_VALUE: 32
PIF_VALUE: 16
PIF_VALUE: 25
PIF_VALUE: 32
PIF_VALUE: 31
PIF_VALUE: 31
PIF_VALUE: 30
PIF_VALUE: 31
PIF_VALUE: 32
PIF_VALUE: 3
PIF_VALUE: 1
PIF_VALUE: 31
PIF_VALUE: 29
PIF_VALUE: 31
PIF_VALUE: 32

## 2021-01-13 ASSESSMENT — PAIN DESCRIPTION - PAIN TYPE
TYPE: SURGICAL PAIN
TYPE: ACUTE PAIN

## 2021-01-13 ASSESSMENT — PAIN SCALES - GENERAL
PAINLEVEL_OUTOF10: 10
PAINLEVEL_OUTOF10: 10

## 2021-01-13 NOTE — ANESTHESIA POSTPROCEDURE EVALUATION
Department of Anesthesiology  Postprocedure Note    Patient: Jossy Rhodes  MRN: [id number]  YOB: 1960  Date of evaluation: 1/13/2021  Time:  3:25 PM     Procedure Summary     Date: 01/13/21 Room / Location: [address]    Anesthesia Start: 0201 Anesthesia Stop:     Procedure: XI ROBOTIC LAPAROSCOPIC PROSTATECTOMY, PELVIC LYMPH NODE DISSECTION (N/A ) Diagnosis: (PROSTATE CANCER)    Surgeons: Buena Riedel, MD Responsible Provider: Sierra Amos MD    Anesthesia Type: general ASA Status: 3          Anesthesia Type: general    Claudio Phase I: Claudio Score: 10    Claudio Phase II:      Last vitals: Reviewed and per EMR flowsheets.        Anesthesia Post Evaluation    Patient location during evaluation: PACU  Patient participation: complete - patient participated  Level of consciousness: awake and alert  Pain score: 3  Airway patency: patent  Nausea & Vomiting: no vomiting and no nausea  Complications: no  Cardiovascular status: hemodynamically stable  Respiratory status: acceptable  Hydration status: stable

## 2021-01-13 NOTE — INTERVAL H&P NOTE
Update History & Physical    The patient's History and Physical of December 30, 2020 was reviewed with the patient and I examined the patient. There was no change. The surgical site was confirmed by the patient and me. Plan: The risks, benefits, expected outcome, and alternative to the recommended procedure have been discussed with the patient. Patient understands and wants to proceed with the procedure. Here for RALP with Dr Eduardo Zhong.      Electronically signed by Eliana Donato PA-C on 1/13/2021 at 9:29 AM

## 2021-01-13 NOTE — OP NOTE
Operative Note      Patient: Augustine Curling  YOB: 1960  MRN: [id number]    Date of Procedure: 1/13/2021    Pre-Op Diagnosis: PROSTATE CANCER    Post-Op Diagnosis: Same       Procedure(s):  XI ROBOTIC LAPAROSCOPIC PROSTATECTOMY, PELVIC LYMPH NODE DISSECTION    Surgeon(s):  Kendell Sosa MD    Assistant:   Maria A Rojas MD  First Assistant: Yakov Iqbal    Anesthesia: General    Estimated Blood Loss (mL): 460 ml    Complications: None    Specimens:   ID Type Source Tests Collected by Time Destination   1 : URINE FOR CULTURE FROM HDEZ INSERTION Urine Urine, indwelling catheter CULTURE, URINE Kendell Sosa MD 1/13/2021 1246    A : PROSTATE AND SEMINAL VESSICLES Tissue Prostate SURGICAL PATHOLOGY Kendell Sosa MD 1/13/2021 1312    B : BILATERAL PELVIC LYMPH NODES Tissue Lymph Node SURGICAL PATHOLOGY Kendell Sosa MD 1/13/2021 1313        Implants:  * No implants in log *      Drains:   Urethral Catheter Double-lumen 18 fr (Active)       Findings: see below     INDICATIONS FOR THE PROCEDURE:  Augustine Curling is a 56 y.o. male with a history of [place] 3+5 prostate cancer. After treatment options were discussed including risks, benefits, alternatives, goals and possible complications,  the patient elected to proceed with today's procedure. PSA:    Lab Results   Component Value Date    PSA 15.94 11/06/2019          DESCRIPTION OF PROCEDURE:  This patient was given preoperative anticoagulation and antibiotics and was brought back to the operating room and positioned in the supine position. General anesthesia was induced. He was secured to the bed and then he was sterilely prepped and draped in standard fashion. Surgical timeout was performed and all parties were in agreement. An 25 [place] Hdez catheter was placed and a left lateral skin incision was made, to avoid patient's prior midline incision from pediatric hernia, and a Veress needle was placed through this into the peritoneum.  Pneumoperitoneum was obtained. An 8 mm port was placed through this incision into the peritoneum. The peritoneum was examined. No injuries were noted. The rest of the ports were placed in the usual fashion: three additional robotic 8 mm ports, 12 mm assistant port, and 5 mm assistant port. Camera port was placed inferior to umbilicus to avoid prior incision. The robot was then docked and any adhesions to the sigmoid colon were taken down. The bladder was reflected in the usual fashion by incising lateral to the medial umbilical ligaments and transecting the urachus. The fat off the anterior aspect of the prostate was excised. Endopelvic fascia on each side was incised. The dorsal vein was ligated with a figure-of-8 stitch of 3-0 V-Loc. This was pexed to the pubic bone. Anterior bladder neck was transected, making sure not to enter the prostate and taking care to avoid the ureteral orifices. Anterior Denonvilliers fascia was identified and incised. The seminal vesicles and vasa were dissected and lifted anteriorly. The posterior Denonvilliers fascia was incised and the rectum was swept off the posterior lateral aspect of the prostate. The prostatic pedicles were ligated and divided with Hem-o-Shy clips and we performed modified standard nerve dissection. This progressed on both sides towards the apex of the prostate. Then, the dorsal venous complex was incised, and the urethra was dissected and then transected, maintaining urethral length. The rectourethralis was transected, the prostate was free, and the pelvis was irrigated. There was adequate hemostasis. The right total pelvic lymph node dissection was performed by removing lymph nodes between the iliac vein and the obturator nerve, making sure not to injure the structures. The same was on the left side by removing the nodes between the iliac vein and the obturator nerve, making sure not to injure the structures.  The lymph nodes and the prostate were placed into an EndoCatch bag and then Calvin stitch was performed with 3-0 Monocryl in figure-of-8 stitch manner, bringing together posterior Denonvilliers fascia and the rectourethralis in order to keep the vesicourethral anastomosis tension free. Anastomosis was performed with a 2-0 quill stitch in a running fashion. This was tied down and a new 25 F Leong catheter was placed. The bladder was irrigated, there was no extravasation noted. We did not leave a drain. Then the robot was undocked, the umbilical skin incision was elongated, and the specimen bag was removed through this incision. Fascia was closed with running 0 PDS suture in bidirectional fashion. . All the skin incisions were irrigated and closed with 4-0 Monocryl and Dermabond. This concluded the procedure. Dr. Eduardo Zhong was present for all critical portions of the procedure. PLAN:  The patient will undergo our routine post-operative prostatectomy pathway. He will have STAT labs in the recovery area. Any changes to routine clinical course will be reflected in the hospital discharge summary.        Electronically signed by Breanna Kwon MD on 1/13/2021 at 2:58 PM

## 2021-01-13 NOTE — ANESTHESIA PRE PROCEDURE
Department of Anesthesiology  Preprocedure Note       Name:  Mary Calzada   Age:  56 y.o.  :  1960                                          MRN:  [id number]         Date:  2021      Surgeon: Vamshi More):  Rodolfo Sr MD    Procedure: Procedure(s):  XI ROBOTIC LAPAROSCOPIC PROSTATECTOMY, PELVIC LYMPH NODE DISSECTION    Medications prior to admission:   Prior to Admission medications    Medication Sig Start Date End Date Taking?  Authorizing Provider   SITagliptin (JANUVIA) 50 MG tablet Take 50 mg by mouth daily   Yes Historical Provider, MD   atenolol (TENORMIN) 50 MG tablet Take 50 mg by mouth daily   Yes Historical Provider, MD   metFORMIN (GLUCOPHAGE) 1000 MG tablet Take 1,000 mg by mouth 2 times daily (with meals)   Yes Historical Provider, MD   amLODIPine (NORVASC) 10 MG tablet Take 10 mg by mouth daily   Yes Historical Provider, MD   hydroCHLOROthiazide (HYDRODIURIL) 25 MG tablet Take 25 mg by mouth daily   Yes Historical Provider, MD   lisinopril (PRINIVIL;ZESTRIL) 40 MG tablet Take 40 mg by mouth daily   Yes Historical Provider, MD   pravastatin (PRAVACHOL) 40 MG tablet Take 40 mg by mouth daily   Yes Historical Provider, MD   sildenafil (VIAGRA) 100 MG tablet Take 100 mg by mouth as needed for Erectile Dysfunction   Yes Historical Provider, MD       Current medications:    Current Facility-Administered Medications   Medication Dose Route Frequency Provider Last Rate Last Admin    ceFAZolin (ANCEF) 2 g in dextrose 5 % 50 mL IVPB  2 g Intravenous On Call to [address] [place] [address]        lactated ringers infusion 1,000 mL  1,000 mL Intravenous Continuous Mariam Love MD 50 mL/hr at 21 0946 1,000 mL at 21 0946       Allergies:  No Known Allergies    Problem List:    Patient Active Problem List   Diagnosis Code    Prostate cancer ([facility] C61       Past Medical History:        Diagnosis Date    Back pain     Diabetes mellitus ([facility]     Dyslipidemia     Elevated PSA     Erectile dysfunction     History of echocardiogram 04/2014    EF 55%, mild LVH, mild MR, moderate TR    Hyperlipemia     Hypertension     LVH (left ventricular hypertrophy)     Pedal edema     Prostate cancer ([address]     Systolic murmur     Wellness examination     pcp-phone visit dec 2020       Past Surgical History:        Procedure Laterality Date    HERNIA REPAIR      as baby    PROSTATE BIOPSY N/A 11/17/2020    PROSTATE BIOPSY W/ US & TECH TO OR performed by Tiffany Valenzuela MD [facility]  11/17/2020     PROSTATE NEEDLE PUNCH 11/17/2020 [facility] ULTRASOUND       Social History:    Social History     Tobacco Use    Smoking status: Never Smoker    Smokeless tobacco: Never Used   Substance Use Topics    Alcohol use: Yes     Comment: socially                                Counseling given: Not Answered      Vital Signs (Current):   Vitals:    01/13/21 0942   BP: 122/72   Pulse: 60   Resp: 16   Temp: 98.2 °F (36.8 °C)   TempSrc: Temporal   SpO2: 100%   Weight: 288 lb (130.6 kg)   Height: 6' 1\" (1.854 m)                                              BP Readings from Last 3 Encounters:   01/13/21 122/72   12/30/20 124/68   12/30/20 125/77       NPO Status: Time of last liquid consumption: 2300                        Time of last solid consumption: 2200                        Date of last liquid consumption: 01/12/21                        Date of last solid food consumption: 01/11/21    BMI:   Wt Readings from Last 3 Encounters:   01/13/21 288 lb (130.6 kg)   12/30/20 288 lb (130.6 kg)   12/30/20 293 lb (132.9 kg)     Body mass index is 38 kg/m².     CBC:   Lab Results   Component Value Date    WBC 7.8 12/30/2020    RBC 4.17 12/30/2020    HGB 12.6 12/30/2020    HCT 38.2 12/30/2020    MCV 91.6 12/30/2020    RDW 13.2 12/30/2020     12/30/2020       CMP:   Lab Results   Component Value Date     12/30/2020    K 3.8 12/30/2020     12/30/2020    CO2 23 12/30/2020    BUN 24 12/30/2020    CREATININE 2.69 01/13/2021    CREATININE 1.64 12/30/2020    GFRAA 52 12/30/2020    LABGLOM 24 01/13/2021    GLUCOSE 177 12/30/2020    PROT 7.8 09/25/2019    CALCIUM 9.3 11/03/2020    BILITOT 0.39 09/25/2019    ALKPHOS 55 09/25/2019    AST 14 09/25/2019    ALT 11 09/25/2019       POC Tests:   Recent Labs     01/13/21  0957   POCGLU 136*   POCNA 137*   POCK 3.7   POCCL 104   POCHEMO 12.8*   POCHCT 38*       Coags: No results found for: PROTIME, INR, APTT    HCG (If Applicable): No results found for: PREGTESTUR, PREGSERUM, HCG, HCGQUANT     ABGs: No results found for: PHART, PO2ART, NAA8BRH, RYN9DCX, BEART, F3ZQXNHE     Type & Screen (If Applicable):  No results found for: LABABO, LABRH    Drug/Infectious Status (If Applicable):  No results found for: HIV, HEPCAB    COVID-19 Screening (If Applicable):   Lab Results   Component Value Date    COVID19 Not Detected 01/09/2021         Anesthesia Evaluation  Patient summary reviewed no history of anesthetic complications:   Airway: Mallampati: II  TM distance: >3 FB   Neck ROM: full  Mouth opening: > = 3 FB Dental:          Pulmonary:Negative Pulmonary ROS and normal exam                               Cardiovascular:    (+) hypertension: no interval change,         Rhythm: regular  Rate: normal                    Neuro/Psych:   Negative Neuro/Psych ROS              GI/Hepatic/Renal:   (+) renal disease: CRI,           Endo/Other:    (+) Diabetesno interval change, , .                 Abdominal:           Vascular: negative vascular ROS. Anesthesia Plan      general     ASA 3       Induction: intravenous. Anesthetic plan and risks discussed with patient. Plan discussed with CRNA.                   Arik Chandler MD   1/13/2021

## 2021-01-14 ENCOUNTER — APPOINTMENT (OUTPATIENT)
Dept: ULTRASOUND IMAGING | Age: 61
DRG: 707 | End: 2021-01-14
Attending: UROLOGY
Payer: COMMERCIAL

## 2021-01-14 LAB
ABO/RH: NORMAL
ANION GAP SERPL CALCULATED.3IONS-SCNC: 10 MMOL/L (ref 9–17)
ANTIBODY SCREEN: NEGATIVE
ARM BAND NUMBER: NORMAL
BLD PROD TYP BPU: NORMAL
BLD PROD TYP BPU: NORMAL
BUN BLDV-MCNC: 35 MG/DL (ref 8–23)
BUN/CREAT BLD: ABNORMAL (ref 9–20)
CALCIUM SERPL-MCNC: 7.8 MG/DL (ref 8.6–10.4)
CHLORIDE BLD-SCNC: 104 MMOL/L (ref 98–107)
CO2: 21 MMOL/L (ref 20–31)
COMPLEMENT C3: 110 MG/DL (ref 90–180)
COMPLEMENT C4: 28 MG/DL (ref 10–40)
CREAT SERPL-MCNC: 1.8 MG/DL (ref 0.7–1.2)
CREATININE URINE: 101.6 MG/DL (ref 39–259)
CROSSMATCH RESULT: NORMAL
CROSSMATCH RESULT: NORMAL
CULTURE: NO GROWTH
DISPENSE STATUS BLOOD BANK: NORMAL
DISPENSE STATUS BLOOD BANK: NORMAL
EXPIRATION DATE: NORMAL
FREE KAPPA/LAMBDA RATIO: 1.37 (ref 0.26–1.65)
GFR AFRICAN AMERICAN: 47 ML/MIN
GFR NON-AFRICAN AMERICAN: 38 ML/MIN
GFR SERPL CREATININE-BSD FRML MDRD: ABNORMAL ML/MIN/{1.73_M2}
GFR SERPL CREATININE-BSD FRML MDRD: ABNORMAL ML/MIN/{1.73_M2}
GLUCOSE BLD-MCNC: 130 MG/DL (ref 75–110)
GLUCOSE BLD-MCNC: 148 MG/DL (ref 70–99)
GLUCOSE BLD-MCNC: 157 MG/DL (ref 75–110)
GLUCOSE BLD-MCNC: 171 MG/DL (ref 75–110)
GLUCOSE BLD-MCNC: 207 MG/DL (ref 75–110)
HCT VFR BLD CALC: 29 % (ref 40.7–50.3)
HEMOGLOBIN: 9.6 G/DL (ref 13–17)
KAPPA FREE LIGHT CHAINS QNT: 2.86 MG/DL (ref 0.37–1.94)
LAMBDA FREE LIGHT CHAINS QNT: 2.09 MG/DL (ref 0.57–2.63)
Lab: NORMAL
MCH RBC QN AUTO: 30 PG (ref 25.2–33.5)
MCHC RBC AUTO-ENTMCNC: 33.1 G/DL (ref 28.4–34.8)
MCV RBC AUTO: 90.6 FL (ref 82.6–102.9)
NRBC AUTOMATED: 0 PER 100 WBC
PDW BLD-RTO: 13.1 % (ref 11.8–14.4)
PLATELET # BLD: 312 K/UL (ref 138–453)
PMV BLD AUTO: 10.4 FL (ref 8.1–13.5)
POTASSIUM SERPL-SCNC: 4.1 MMOL/L (ref 3.7–5.3)
RBC # BLD: 3.2 M/UL (ref 4.21–5.77)
SODIUM BLD-SCNC: 135 MMOL/L (ref 135–144)
SODIUM,UR: 61 MMOL/L
SPECIMEN DESCRIPTION: NORMAL
TOTAL PROTEIN, URINE: 33 MG/DL
TRANSFUSION STATUS: NORMAL
TRANSFUSION STATUS: NORMAL
UNIT DIVISION: 0
UNIT DIVISION: 0
UNIT NUMBER: NORMAL
UNIT NUMBER: NORMAL
WBC # BLD: 10.9 K/UL (ref 3.5–11.3)

## 2021-01-14 PROCEDURE — 83883 ASSAY NEPHELOMETRY NOT SPEC: CPT

## 2021-01-14 PROCEDURE — 1200000000 HC SEMI PRIVATE

## 2021-01-14 PROCEDURE — 84156 ASSAY OF PROTEIN URINE: CPT

## 2021-01-14 PROCEDURE — 82947 ASSAY GLUCOSE BLOOD QUANT: CPT

## 2021-01-14 PROCEDURE — 86160 COMPLEMENT ANTIGEN: CPT

## 2021-01-14 PROCEDURE — 76770 US EXAM ABDO BACK WALL COMP: CPT

## 2021-01-14 PROCEDURE — 84300 ASSAY OF URINE SODIUM: CPT

## 2021-01-14 PROCEDURE — 80048 BASIC METABOLIC PNL TOTAL CA: CPT

## 2021-01-14 PROCEDURE — 6370000000 HC RX 637 (ALT 250 FOR IP): Performed by: STUDENT IN AN ORGANIZED HEALTH CARE EDUCATION/TRAINING PROGRAM

## 2021-01-14 PROCEDURE — 2580000003 HC RX 258: Performed by: STUDENT IN AN ORGANIZED HEALTH CARE EDUCATION/TRAINING PROGRAM

## 2021-01-14 PROCEDURE — 99244 OFF/OP CNSLTJ NEW/EST MOD 40: CPT | Performed by: INTERNAL MEDICINE

## 2021-01-14 PROCEDURE — 6360000002 HC RX W HCPCS: Performed by: STUDENT IN AN ORGANIZED HEALTH CARE EDUCATION/TRAINING PROGRAM

## 2021-01-14 PROCEDURE — G0378 HOSPITAL OBSERVATION PER HR: HCPCS

## 2021-01-14 PROCEDURE — 85027 COMPLETE CBC AUTOMATED: CPT

## 2021-01-14 PROCEDURE — 36415 COLL VENOUS BLD VENIPUNCTURE: CPT

## 2021-01-14 PROCEDURE — 82570 ASSAY OF URINE CREATININE: CPT

## 2021-01-14 RX ADMIN — PRAVASTATIN SODIUM 40 MG: 20 TABLET ORAL at 09:06

## 2021-01-14 RX ADMIN — POLYETHYLENE GLYCOL 3350 17 G: 17 POWDER, FOR SOLUTION ORAL at 09:12

## 2021-01-14 RX ADMIN — Medication 10 ML: at 09:07

## 2021-01-14 RX ADMIN — Medication 3 G: at 03:34

## 2021-01-14 RX ADMIN — ACETAMINOPHEN 650 MG: 325 TABLET ORAL at 06:15

## 2021-01-14 RX ADMIN — ACETAMINOPHEN 650 MG: 325 TABLET ORAL at 17:59

## 2021-01-14 RX ADMIN — Medication 3 G: at 12:01

## 2021-01-14 RX ADMIN — ALOGLIPTIN 12.5 MG: 12.5 TABLET, FILM COATED ORAL at 09:07

## 2021-01-14 RX ADMIN — INSULIN LISPRO 2 UNITS: 100 INJECTION, SOLUTION INTRAVENOUS; SUBCUTANEOUS at 09:06

## 2021-01-14 RX ADMIN — ACETAMINOPHEN 650 MG: 325 TABLET ORAL at 12:01

## 2021-01-14 ASSESSMENT — PAIN SCALES - GENERAL: PAINLEVEL_OUTOF10: 3

## 2021-01-14 NOTE — PROGRESS NOTES
male s/p RALP POD #1    Plan:   Regular diet  Reduce IVF  Ambulate, out of bed  Incentive spirometry  Pending morning labs  Leave cuevas, will be discharged with it.    Pain control and antiemetics as needed  Discharge planning     Negro Wong  6:43 AM 1/14/2021

## 2021-01-14 NOTE — CARE COORDINATION
Case Management Initial Discharge Plan  Jossy Rhodes,             Met with:patient to discuss discharge plans. Information verified: address, contacts, phone number, , insurance Yes    Emergency Contact/Next of Kin name & number: Denice Arana (son) [phone]    PCP: Sandra Sheikh MD  Date of last visit: 1 week ago    Insurance Provider: Mike [address]    Discharge Planning    Living Arrangements:  Children   Support Systems:  [id number] has 1 stories  3 stairs to climb to get into front door,     Patient able to perform ADL's:Independent    Current Services (outpatient & in home) noneDME equipment: glucometer  DME provider:     Receiving oral anticoagulation therapy? No    If indicated:   Physician managing anticoagulation treatment:   Where does patient obtain lab work for ATC treatment? Potential Assistance Needed:  N/A    Patient agreeable to home care: No  [place] of choice provided:  n/a    Prior SNF/Rehab Placement and Facility:   Agreeable to SNF/Rehab: No  [place] of choice provided: n/a     Evaluation: no    Expected Discharge date:  01/15/21    Patient expects to be discharged to:  return to home  Follow Up Appointment: Best Day/ Time:      Transportation provider: self  Transportation arrangements needed for discharge: No, family [facility] transport. Readmission Risk              Risk of Unplanned Readmission:        0             Does patient have a readmission risk score greater than 14?: not calculated  If yes, follow-up appointment must be made within 7 days of discharge.      Goals of Care:   Decreased pain    Discharge Plan: return to home          Electronically signed by Aj Jones RN on 21 at 1:39 PM EST

## 2021-01-14 NOTE — CONSULTS
NEPHROLOGY     REASON FOR CONSULT:     KEL (BUN/Creat 2.3          with baseline creatinine     unknown           )          HISTORY OF PRESENTING ILLNESS                 This is a 56 y.o. male who was admitted and underwent elective robotic prostatectomy. Minimal intraoperative blood loss. He was diagnosed to have prostate cancer in the month of November without any symptoms but noted to have elevated PSA. Had a biopsy at that time. Postoperatively noted to be hypotensive and worsening of renal function prompting nephrology consultation. Baseline creatinine is unknown. Urine output decent. Does not have any Leong catheter. No history of contrast exposure  Present history of hypotension  No history suggestive of obstruction  No history of nausea/vomiting/diarrhoea  No history of KEL in past  No history of recurrent UTI infection/surgeries to KUB.       PAST MEDICAL HISTORY         Diagnosis Date    Back pain     Diabetes mellitus (HCC)     Dyslipidemia     Elevated PSA     Erectile dysfunction     History of echocardiogram 04/2014    EF 55%, mild LVH, mild MR, moderate TR    Hyperlipemia     Hypertension     LVH (left ventricular hypertrophy)     Pedal edema     Prostate cancer ([address]     Systolic murmur     Wellness examination     pcp-phone visit dec 2020       PAST SURGICAL HISTORY          Procedure Laterality Date    HERNIA REPAIR      as baby    PROSTATE BIOPSY N/A 11/17/2020    PROSTATE BIOPSY W/ US & TECH TO OR performed by Addison Conte MD at [address] [place] [address]  01/13/2021    robotic/laprascopic/pelvic lymph node dissection    US PROSTATE NEEDLE PUNCH  11/17/2020    US PROSTATE NEEDLE PUNCH 11/17/2020 [facility] ULTRASOUND       MEDICATIONS     Home Meds:                Medications Prior to Admission: SITagliptin (JANUVIA) 50 MG tablet, Take 50 mg by mouth daily  atenolol (TENORMIN) 50 MG tablet, Take 50 mg by mouth daily  metFORMIN (GLUCOPHAGE) 1000 MG tablet, Take 1,000 mg by mouth 2 times daily (with meals)  amLODIPine (NORVASC) 10 MG tablet, Take 10 mg by mouth daily  hydroCHLOROthiazide (HYDRODIURIL) 25 MG tablet, Take 25 mg by mouth daily  lisinopril (PRINIVIL;ZESTRIL) 40 MG tablet, Take 40 mg by mouth daily  pravastatin (PRAVACHOL) 40 MG tablet, Take 40 mg by mouth daily  sildenafil (VIAGRA) 100 MG tablet, Take 100 mg by mouth as needed for Erectile Dysfunction  Scheduled Meds:    pravastatin  40 mg Oral Daily    alogliptin  12.5 mg Oral Daily    insulin lispro  0-6 Units Subcutaneous TID WC    insulin lispro  0-3 Units Subcutaneous Nightly    sodium chloride flush  10 mL Intravenous 2 times per day    polyethylene glycol  17 g Oral Daily    acetaminophen  650 mg Oral 4 times per day     Continuous Infusions:    sodium chloride 50 mL/hr at 01/14/21 0835    dextrose       PRN Meds:  sodium chloride flush, potassium chloride **OR** potassium alternative oral replacement **OR** potassium chloride, ondansetron, oxyCODONE **OR** oxyCODONE, morphine, glucose, dextrose, glucagon (rDNA), dextrose    ALLERGY     Patient has no known allergies.        SOCIAL HISTORY     Social History     Socioeconomic History    Marital status: Single     Spouse name: Not on file    Number of children: Not on file    Years of education: Not on file    Highest education level: Not on file   Occupational History    Not on file   Social Needs    Financial resource strain: Not on file    Food insecurity     Worry: Not on file     Inability: Not on file    Transportation needs     Medical: Not on file     Non-medical: Not on file   Tobacco Use    Smoking status: Never Smoker    Smokeless tobacco: Never Used   Substance and Sexual Activity    Alcohol use: Yes     Comment: socially    Drug use: Never    Sexual activity: Yes   Lifestyle    Physical activity     Days per week: Not on file     Minutes per session: Not on file    Stress: Not on file   Relationships    Social connections     Talks on phone: Not on file     Gets together: Not on file     Attends Hinduism service: Not on file     Active member of club or organization: Not on file     Attends meetings of clubs or organizations: Not on file     Relationship status: Not on file    Intimate partner violence     Fear of current or ex partner: Not on file     Emotionally abused: Not on file     Physically abused: Not on file     Forced sexual activity: Not on file   Other Topics Concern    Not on file   Social History Narrative    Not on file       FAMILY HISTORY     Family History   Problem Relation Age of Onset    Diabetes Mother     High Blood Pressure Mother     Cancer Father           REVIEW OF SYSTEM     Constitutional: No asthenia/weight loss/anorexia    HEENT : No epistaxis/visual blurriness/rhinorrhoea/sorethroat/trauma  Cardiovascular:No chest pain/palpitation/SOB  Respiratory: No cough/fever/SOB/Wheezing    Gastrointestinal: No abdominal pain/nausea/vomiting/diarrhoea/constipation  Genitourinary: No dysuria/pyuria/hematuria/incomplete emptying of bladder  Musculoskeletal:  No gait disturbance/weakness or joint complaints  Integumentary: No rash or pruritis. Neurological: No headache/diplopia/change in muscle strength/numbness or tingling. No change in gait, balance, coordination, mood, affect, memory, mentation, behavior. Psychiatric: No anxiety/depression. Endocrine: No temperature intolerance. No excessive thirst, fluid intake, or urination. No tremor. Hematologic/Lymphatic: No abnormal bruising or bleeding, blood clots or swolle lymph nodes.   Allergic/Immunologic: No nasal congestion or hives      PHYSICAL EXAM     Vitals:    01/13/21 2017 01/14/21 0145 01/14/21 0800 01/14/21 1141   BP: (!) 95/58 (!) 99/57 96/62 (!) 88/59   Pulse: 68 71 81 75   Resp: 16 16 16 18   Temp: 98.7 °F (37.1 °C) 99.4 °F (37.4 °C) 98.6 °F (37 °C) 97.8 °F (36.6 °C)   TempSrc: Oral Oral Axillary Oral   SpO2: 97%  98% 96%   Weight:       Height: 24HR INTAKE/OUTPUT:      Intake/Output Summary (Last 24 hours) at 1/14/2021 1302  Last data filed at 1/14/2021 7395  Gross per 24 hour   Intake 2975 ml   Output 1850 ml   Net 1125 ml       General appearance:Awake, alert, in no acute distress  Skin: warm and dry, no rash or erythema  Eyes: conjunctivae normal and sclera anicteric  ENT: no thrush no pharyngeal congestion  orodental hygiene   Neck: No JVD, Lymphadenopathty or thyromegaly  Respiratory: vesicular breath sounds,no wheeze/crackles  Cardiovascular: S1 S2 normal,no gallop or organic murmur. No carotid bruit  Abdomen:Non tender/non distended. Bowel sounds present  Extremities: No Cyanosis or Clubbing,Lower extremity edema  Neurological:Alert and oriented. No abnormalities of mood, affect, memory, mentation, or behavior are noted    INVESTIGATIONS      CBC:   Recent Labs     01/13/21  1531 01/14/21  0556   WBC 14.7* 10.9   RBC 3.57* 3.20*   HGB 10.8* 9.6*   HCT 33.6* 29.0*   MCV 94.1 90.6   MCH 30.3 30.0   MCHC 32.1 33.1   RDW 13.2 13.1    312   MPV 10.3 10.4      BMP:   Recent Labs     01/13/21  0957 01/13/21  1531 01/14/21  0556   NA  --  134* 135   K  --  4.1 4.1   CL  --  103 104   CO2  --  21 21   BUN  --  42* 35*   CREATININE 2.69* 2.31* 1.80*   GLUCOSE  --  170* 148*   CALCIUM  --  8.6 7.8*        Phosphorus:  No results for input(s): PHOS in the last 72 hours. Magnesium: No results for input(s): MG in the last 72 hours. Albumin: No results for input(s): LABALBU in the last 72 hours. Radiology:  Reviewed as available. ASSESSMENT     1. Acute kidney injury secondary to postoperative hypotension/ACE inhibitor and diuretic use -improving. Creatinine peaked at 2.3  Baseline creatinine normal  2. Status post robotic prostatectomy for carcinoma prostate  3. Type 2 diabetes  4.  History of hypertension     PLAN:     #1 continue IV fluids  #2 hold all antihypertensive medications  #3 avoid nephrotoxins  #4 urine studies/serologies/ultrasound as ordered  #5 anticipate renal recovery    Thank you for the consultation. Please do not hesitate to call with questions. This note is created with the assistance of a speech-recognition program. While intending to generate a document that actually reflects the content of the visit, no guarantees can be provided that every mistake has been identified and corrected by editing.     Lidia Keane MD, MRCP Ranulfo Hutchins, FACP   1/14/2021 1:02 PM    [facility]

## 2021-01-15 VITALS
HEART RATE: 85 BPM | RESPIRATION RATE: 16 BRPM | OXYGEN SATURATION: 98 % | TEMPERATURE: 97.9 F | BODY MASS INDEX: 38.17 KG/M2 | HEIGHT: 73 IN | WEIGHT: 288 LBS | DIASTOLIC BLOOD PRESSURE: 70 MMHG | SYSTOLIC BLOOD PRESSURE: 148 MMHG

## 2021-01-15 LAB
ALBUMIN SERPL-MCNC: 3.1 G/DL (ref 3.5–5.2)
ALBUMIN/GLOBULIN RATIO: 1 (ref 1–2.5)
ALP BLD-CCNC: 47 U/L (ref 40–129)
ALT SERPL-CCNC: 6 U/L (ref 5–41)
ANION GAP SERPL CALCULATED.3IONS-SCNC: 10 MMOL/L (ref 9–17)
AST SERPL-CCNC: 17 U/L
BILIRUB SERPL-MCNC: 0.38 MG/DL (ref 0.3–1.2)
BILIRUBIN DIRECT: 0.11 MG/DL
BILIRUBIN, INDIRECT: 0.27 MG/DL (ref 0–1)
BUN BLDV-MCNC: 29 MG/DL (ref 8–23)
BUN/CREAT BLD: ABNORMAL (ref 9–20)
CALCIUM SERPL-MCNC: 8 MG/DL (ref 8.6–10.4)
CHLORIDE BLD-SCNC: 105 MMOL/L (ref 98–107)
CO2: 23 MMOL/L (ref 20–31)
CREAT SERPL-MCNC: 1.39 MG/DL (ref 0.7–1.2)
GFR AFRICAN AMERICAN: >60 ML/MIN
GFR NON-AFRICAN AMERICAN: 52 ML/MIN
GFR SERPL CREATININE-BSD FRML MDRD: ABNORMAL ML/MIN/{1.73_M2}
GFR SERPL CREATININE-BSD FRML MDRD: ABNORMAL ML/MIN/{1.73_M2}
GLOBULIN: ABNORMAL G/DL (ref 1.5–3.8)
GLUCOSE BLD-MCNC: 116 MG/DL (ref 70–99)
GLUCOSE BLD-MCNC: 121 MG/DL (ref 75–110)
GLUCOSE BLD-MCNC: 128 MG/DL (ref 75–110)
GLUCOSE BLD-MCNC: 188 MG/DL (ref 75–110)
HCT VFR BLD CALC: 28.2 % (ref 40.7–50.3)
HEMOGLOBIN: 9.2 G/DL (ref 13–17)
MCH RBC QN AUTO: 30.2 PG (ref 25.2–33.5)
MCHC RBC AUTO-ENTMCNC: 32.6 G/DL (ref 28.4–34.8)
MCV RBC AUTO: 92.5 FL (ref 82.6–102.9)
NRBC AUTOMATED: 0 PER 100 WBC
PDW BLD-RTO: 13.2 % (ref 11.8–14.4)
PLATELET # BLD: 289 K/UL (ref 138–453)
PMV BLD AUTO: 10.1 FL (ref 8.1–13.5)
POTASSIUM SERPL-SCNC: 4 MMOL/L (ref 3.7–5.3)
RBC # BLD: 3.05 M/UL (ref 4.21–5.77)
SODIUM BLD-SCNC: 138 MMOL/L (ref 135–144)
TOTAL PROTEIN: 6.2 G/DL (ref 6.4–8.3)
WBC # BLD: 11 K/UL (ref 3.5–11.3)

## 2021-01-15 PROCEDURE — 80076 HEPATIC FUNCTION PANEL: CPT

## 2021-01-15 PROCEDURE — 6370000000 HC RX 637 (ALT 250 FOR IP): Performed by: STUDENT IN AN ORGANIZED HEALTH CARE EDUCATION/TRAINING PROGRAM

## 2021-01-15 PROCEDURE — 99232 SBSQ HOSP IP/OBS MODERATE 35: CPT | Performed by: INTERNAL MEDICINE

## 2021-01-15 PROCEDURE — G0378 HOSPITAL OBSERVATION PER HR: HCPCS

## 2021-01-15 PROCEDURE — 82947 ASSAY GLUCOSE BLOOD QUANT: CPT

## 2021-01-15 PROCEDURE — 85027 COMPLETE CBC AUTOMATED: CPT

## 2021-01-15 PROCEDURE — 80048 BASIC METABOLIC PNL TOTAL CA: CPT

## 2021-01-15 PROCEDURE — 36415 COLL VENOUS BLD VENIPUNCTURE: CPT

## 2021-01-15 RX ADMIN — PRAVASTATIN SODIUM 40 MG: 20 TABLET ORAL at 08:06

## 2021-01-15 RX ADMIN — ALOGLIPTIN 12.5 MG: 12.5 TABLET, FILM COATED ORAL at 08:06

## 2021-01-15 RX ADMIN — ACETAMINOPHEN 650 MG: 325 TABLET ORAL at 16:18

## 2021-01-15 RX ADMIN — ACETAMINOPHEN 650 MG: 325 TABLET ORAL at 08:07

## 2021-01-15 RX ADMIN — POLYETHYLENE GLYCOL 3350 17 G: 17 POWDER, FOR SOLUTION ORAL at 08:07

## 2021-01-15 RX ADMIN — ACETAMINOPHEN 650 MG: 325 TABLET ORAL at 00:47

## 2021-01-15 ASSESSMENT — PAIN SCALES - GENERAL: PAINLEVEL_OUTOF10: 3

## 2021-01-15 NOTE — PROGRESS NOTES
CLINICAL PHARMACY NOTE: MEDS TO [address] Select Patient?: No  Total # of Prescriptions Filled: 2   The following medications were delivered to the patient:  · Ciprofloxacin  · Percocet  Total # of Interventions Completed: 0  Time Spent (min): 0    Additional Documentation: meds delivered to patient 1/15 at 6pm. Patient in room 325. Co-pay ($3.97).

## 2021-01-15 NOTE — DISCHARGE SUMMARY
DISCHARGE SUMMARY NOTE:        Patient Identification  PATIENT: Mary Calzada is a 56 y.o. male. MRN: [id number]  :  1960  Admit Date:  2021  Discharge date:   01/15/21                                  Disposition: home  Discharged Condition:  good  Discharge Diagnoses:   Prostate cancer  KEL    Consults: nephrology    Surgery: XI ROBOTIC LAPAROSCOPIC PROSTATECTOMY, PELVIC LYMPH NODE DISSECTION     Patient Instructions: Activity: Per discharge instructions  Diet: As tolerated  Patient told to follow up with Rodolfo Sr MD in 1 week(s). Discharge Medications:    Delfino Medina   Home Medication Instructions [facility]    Printed on:01/15/21 8700   Medication Information                      amLODIPine (NORVASC) 10 MG tablet  Take 10 mg by mouth daily             atenolol (TENORMIN) 50 MG tablet  Take 50 mg by mouth daily             ciprofloxacin (CIPRO) 500 MG tablet  Take 1 tablet by mouth 2 times daily for 3 days             hydroCHLOROthiazide (HYDRODIURIL) 25 MG tablet  Take 25 mg by mouth daily             lisinopril (PRINIVIL;ZESTRIL) 40 MG tablet  Take 40 mg by mouth daily             metFORMIN (GLUCOPHAGE) 1000 MG tablet  Take 1,000 mg by mouth 2 times daily (with meals)             oxyCODONE-acetaminophen (PERCOCET) 5-325 MG per tablet  Take 1 tablet by mouth every 6 hours as needed for Pain for up to 5 days.  May take 2 tablets if needed             polyethylene glycol (GLYCOLAX) 17 GM/SCOOP powder  Take 17 g by mouth daily as needed (while taking narcotics to avoid hard bowel movements/constipation)             pravastatin (PRAVACHOL) 40 MG tablet  Take 40 mg by mouth daily             sildenafil (VIAGRA) 100 MG tablet  Take 100 mg by mouth as needed for Erectile Dysfunction             SITagliptin (JANUVIA) 50 MG tablet  Take 50 mg by mouth daily                 Hospital course: Mary Calzada is an 56 y.o. that underwent a Robotic Assisted Laparoscopic Prostatectomy by Rodolfo Sr MD on  1/13/2021. For more details please see the operative report. Admitted post op for pain control and to monitor renal function as his preop creatinine was elevated. POD1 creatinine was 1.8 and nephrology consulted. Pt held overnight for fluids and nephrology to do workup. POD2 creatinine down to 1.39 and nephrology ok with discharge. The patient did well in their hospital course. The diet was advanced to regular. The patient had pain controlled with PO meds, tolerated diet, and was ambulating appropriately. Hemoglobin was stable on discharge at 9. 2. The patient was afebrile for 24 hrs before discharge. They were given a prescription for antibiotic to take starting the day prior to catheter removal. The patient agrees to discharge, understands discharge instructions, and agrees to follow up for cystogram and cuevas catheter removal as scheduled.        Yandel Morillo PA-C  4:19 PM 1/15/2021

## 2021-01-15 NOTE — PROGRESS NOTES
Renal Progress Note    Patient :  Redd Damon; 56 y.o. MRN# [id number]  Location:  [id number]  Attending:  Fabienne Beckwith MD  Admit Date:  1/13/2021   Hospital Day: 0      Subjective:     Patient was seen and examined. No new issues reported overnight. Patient states that he feels a lot better today. Has been having good oral intake. Currently on IV fluids 75 cc an hour normal saline. Serum creatinine improved to 1.39 mg/DL today. Electrolytes okay. Blood pressure better. Urine output 3.1 L in the last 24 hours. Complements normal.  K/L 1.37. Renal ultrasound 1/14/2021: Right kidney and left kidney both 12.2 cm. Borderline mild left hydronephrosis. Empty urinary bladder but otherwise unremarkable study. No hydronephrosis on right or ultrasound evidence of medical renal disease.   Outpatient Medications:     Medications Prior to Admission: SITagliptin (JANUVIA) 50 MG tablet, Take 50 mg by mouth daily  atenolol (TENORMIN) 50 MG tablet, Take 50 mg by mouth daily  metFORMIN (GLUCOPHAGE) 1000 MG tablet, Take 1,000 mg by mouth 2 times daily (with meals)  amLODIPine (NORVASC) 10 MG tablet, Take 10 mg by mouth daily  hydroCHLOROthiazide (HYDRODIURIL) 25 MG tablet, Take 25 mg by mouth daily  lisinopril (PRINIVIL;ZESTRIL) 40 MG tablet, Take 40 mg by mouth daily  pravastatin (PRAVACHOL) 40 MG tablet, Take 40 mg by mouth daily  sildenafil (VIAGRA) 100 MG tablet, Take 100 mg by mouth as needed for Erectile Dysfunction    Current Medications:     Scheduled Meds:    pravastatin  40 mg Oral Daily    alogliptin  12.5 mg Oral Daily    insulin lispro  0-6 Units Subcutaneous TID     insulin lispro  0-3 Units Subcutaneous Nightly    sodium chloride flush  10 mL Intravenous 2 times per day    polyethylene glycol  17 g Oral Daily    acetaminophen  650 mg Oral 4 times per day     Continuous Infusions:    sodium chloride 75 mL/hr at 01/14/21 1355    dextrose       PRN Meds:  sodium chloride flush, potassium chloride **OR** potassium alternative oral replacement **OR** potassium chloride, ondansetron, oxyCODONE **OR** oxyCODONE, morphine, glucose, dextrose, glucagon (rDNA), dextrose    Input/Output:       I/O last 3 completed shifts: In: 4675 [P.O.:1800; I.V.:1624]  Out: 3100 [Urine:3100]. Patient Vitals for the past 96 hrs (Last 3 readings):   Weight   21 0942 288 lb (130.6 kg)       Vital Signs:   Temperature:  Temp: 98.4 °F (36.9 °C)  TMax:   Temp (24hrs), Av.4 °F (36.9 °C), Min:97.9 °F (36.6 °C), Max:98.8 °F (37.1 °C)    Respirations:  Resp: 18  Pulse:   Pulse: 71  BP:    BP: (!) 149/77  BP Range: Systolic (14NAW), DVT:667 , Min:110 , DIANA:937       Diastolic (60YSN), AS, Min:61, Max:77      Physical Examination:     General:  AAO x 3, speaking in full sentences, no accessory muscle use. HEENT: Atraumatic, normocephalic, no throat congestion, moist mucosa. Eyes:   Pupils equal, round and reactive to light, EOMI. Neck:   Supple  Chest:   Bilateral vesicular breath sounds, no rales or wheezes. Cardiac:  S1 S2 RR, no murmurs, gallops or rubs. Abdomen: Soft, non-tender, no masses or organomegaly, BS audible. :   No suprapubic or flank tenderness. Neuro:  AAO x 3, No FND. SKIN:  No rashes, good skin turgor. Extremities:  No edema.     Labs:       Recent Labs     21  1531 21  0556 01/15/21  0537   WBC 14.7* 10.9 11.0   RBC 3.57* 3.20* 3.05*   HGB 10.8* 9.6* 9.2*   HCT 33.6* 29.0* 28.2*   MCV 94.1 90.6 92.5   MCH 30.3 30.0 30.2   MCHC 32.1 33.1 32.6   RDW 13.2 13.1 13.2    312 289   MPV 10.3 10.4 10.1      BMP:   Recent Labs     21  1531 21  0556 01/15/21  0537   * 135 138   K 4.1 4.1 4.0    104 105   CO2    BUN 42* 35* 29*   CREATININE 2.31* 1.80* 1.39*   GLUCOSE 170* 148* 116*   CALCIUM 8.6 7.8* 8.0*      Albumin:    Recent Labs     01/15/21  0537   LABALBU 3.1*     SPEP:  Lab Results   Component Value Date    PROT 6.2 01/15/2021     C3:     Lab Results   Component Value Date    C3 110 01/14/2021     C4:     Lab Results   Component Value Date    C4 28 01/14/2021     Urinalysis/Chemistries:      Urine Sodium:     Lab Results   Component Value Date    KILLIAN 61 01/14/2021      Urine Creatinine:     Lab Results   Component Value Date    LABCREA 101.6 01/14/2021     Radiology:     Reviewed. Assessment:     1. Acute Kidney Injury likely secondary to postoperative hypotension/ACE inhibitor usage and diuretic usage. Improving. Peak creatinine this admission was 2.3 mg/DL. Baseline creatinine normal.  2.  History of prostate cancer status post robotic prostatectomy done on 1/13/2021.  3.  Type 2 diabetes. 4.  Essential hypertension. 5.  Anemia -likely postop. Plan:   1. Can stop IV fluids as oral intake seems to have improved. 2.  Hold lisinopril and can be started as outpatient. 3.  Can restart Norvasc and hydrochlorothiazide from discharge if renal function continues to be stable. 4.  Leong management as per urology. 5.  Since her creatinine is improving appropriately and urine output okay, nephrology will sign off. Please call with any other questions. 6.  BMP in 1 week post discharge and fax results to Dr. Raiza Ruiz office; Fax # [phone].  7.  Follow up with Dr. Raiza Ruiz in 3-4 weeks post d/c. Nutrition   Please ensure that patient is on a renal diet/TF. Avoid nephrotoxic drugs/contrast exposure. We will continue to follow along with you. Lior Cyr MD  [facility]     This note is created with the assistance of a speech-recognition program. While intending to generate a document that actually reflects the content of the visit, no guarantees can be provided that every mistake has been identified and corrected by editing.

## 2021-01-15 NOTE — PROGRESS NOTES
Pain control and antiemetics as needed  Appreciate nephrology recommendations  Discharge planning       Kenzie Borrego  6:10 AM 1/15/2021

## 2021-01-18 LAB — SURGICAL PATHOLOGY REPORT: NORMAL

## 2021-01-20 ENCOUNTER — OFFICE VISIT (OUTPATIENT)
Dept: UROLOGY | Age: 61
End: 2021-01-20

## 2021-01-20 ENCOUNTER — HOSPITAL ENCOUNTER (OUTPATIENT)
Dept: GENERAL RADIOLOGY | Age: 61
Discharge: HOME | End: 2021-01-22
Payer: COMMERCIAL

## 2021-01-20 VITALS
BODY MASS INDEX: 38.17 KG/M2 | DIASTOLIC BLOOD PRESSURE: 80 MMHG | HEART RATE: 67 BPM | HEIGHT: 73 IN | WEIGHT: 288 LBS | TEMPERATURE: 98.3 F | SYSTOLIC BLOOD PRESSURE: 120 MMHG

## 2021-01-20 DIAGNOSIS — Z90.79 S/P PROSTATECTOMY: Primary | ICD-10-CM

## 2021-01-20 DIAGNOSIS — C61 PROSTATE CANCER (HCC): ICD-10-CM

## 2021-01-20 PROCEDURE — 6360000004 HC RX CONTRAST MEDICATION: Performed by: UROLOGY

## 2021-01-20 PROCEDURE — 74430 CONTRAST X-RAY BLADDER: CPT

## 2021-01-20 PROCEDURE — 99024 POSTOP FOLLOW-UP VISIT: CPT | Performed by: NURSE PRACTITIONER

## 2021-01-20 PROCEDURE — 51600 INJECTION FOR BLADDER X-RAY: CPT

## 2021-01-20 RX ADMIN — IOPAMIDOL 300 ML: 612 INJECTION, SOLUTION INTRAVENOUS at 10:00

## 2021-01-20 ASSESSMENT — ENCOUNTER SYMPTOMS
DIARRHEA: 0
EYE PAIN: 0
ABDOMINAL PAIN: 1
CONSTIPATION: 0
EYE REDNESS: 0
NAUSEA: 0
COUGH: 0
WHEEZING: 0
VOMITING: 0
BACK PAIN: 0
SHORTNESS OF BREATH: 1

## 2021-01-20 NOTE — PATIENT INSTRUCTIONS
finish antibiotic     6 weeks PSA     Start kegels and quick flicks todays:    Kegel exercise: squeeze and hold 3 seconds, relax and release 3 seconds, repeat 10-15 times in a row, at lease 6 times  per day     Quick flicks: after kegel, squeeze, release no hold, 12-15 times in a row, at least 6 times per day

## 2021-01-20 NOTE — PROGRESS NOTES
Review of Systems   Constitutional: Negative for appetite change, chills and fatigue. Eyes: Negative for pain, redness and visual disturbance. Respiratory: Positive for shortness of breath. Negative for cough and wheezing. Cardiovascular: Negative for chest pain and leg swelling. Gastrointestinal: Positive for abdominal pain. Negative for constipation, diarrhea, nausea and vomiting. Genitourinary: Negative for difficulty urinating, dysuria, flank pain, frequency, hematuria and urgency. Musculoskeletal: Negative for back pain, joint swelling and myalgias. Skin: Negative for rash and wound. Neurological: Negative for dizziness, weakness and numbness. Hematological: Does not bruise/bleed easily.

## 2021-01-20 NOTE — LETTER
[address] [address]  Dept: [phone]  Dept Fax: [phone]        1/20/21    Patient: Lolita Croft  YOB: 1960    Dear Lisa Magaña MD,    I had the pleasure of seeing one of your patients, Thiago Banda today in the office today. Below are the relevant portions of my assessment and plan of care. IMPRESSION:  1. S/P prostatectomy    2. Prostate cancer [facility])        PLAN:  discussed path with pt/Dr Summer Geller    finish antibiotic    6 weeks PSA    Start kegels and quick flicks todays:    Kegel exercise: squeeze and hold 3 seconds, relax and release 3 seconds, repeat 10-15 times in a row, at lease 6 times  per day    Quick flicks: after kegel, squeeze, release no hold, 12-15 times in a row, at least 6 times per day             Thank you for allowing me to participate in the care of this patient. I will keep you updated on this patient's follow up and I look forward to serving you and your patients again in the future.     Ferny Vargas, MAURICIO - CNP

## 2021-01-20 NOTE — PROGRESS NOTES
[address] [address]  Dept: [address] [facility] Urology Office Note - Established    Patient:  Eva Simmons  YOB: 1960  Date: 1/20/2021    The patient is a 56 y.o. male who presents todayfor evaluation of the following problems:   Chief Complaint   Patient presents with    Post-Op Check     1 week post prostatectomy, just had cuevas removed        HPI  Here post RRP-he had his catheter removed today. He is feeling good. Bowels are moving. He started his antibiotic yesterday. Summary of old records: N/A    Additional History: N/A    Procedures Today: N/A    Urinalysis today:  No results found for this visit on 01/20/21.   Last several PSA's:  Lab Results   Component Value Date    PSA 15.94 (H) 11/06/2019     Last total testosterone:  No results found for: TESTOSTERONE    AUA Symptom Score (1/20/2021):                               Last BUN and creatinine:  Lab Results   Component Value Date    BUN 29 (H) 01/15/2021     Lab Results   Component Value Date    CREATININE 1.39 (H) 01/15/2021       Additional Lab/Culture results:     Imaging Reviewed during this Office Visit:  (results were independently reviewed by physician and radiology report verified)    PAST MEDICAL, FAMILY AND SOCIAL HISTORY UPDATE:  Past Medical History:   Diagnosis Date    Back pain     Diabetes mellitus ([address]     Dyslipidemia     Elevated PSA     Erectile dysfunction     History of echocardiogram 04/2014    EF 55%, mild LVH, mild MR, moderate TR    Hyperlipemia     Hypertension     LVH (left ventricular hypertrophy)     Pedal edema     Prostate cancer ([address]     Systolic murmur     Wellness examination     pcp-phone visit dec 2020     Past Surgical History:   Procedure Laterality Date    HERNIA REPAIR      as baby    PROSTATE BIOPSY N/A 11/17/2020    PROSTATE BIOPSY W/ US & TECH TO OR performed [facility]ah [facility]acho [facility]    PROSTATECTOMY  01/13/2021    robotic/laprascopic/pelvic lymph node dissection    PROSTATECTOMY N/A 1/13/2021    XI ROBOTIC LAPAROSCOPIC PROSTATECTOMY, PELVIC LYMPH NODE DISSECTION performed by Tiffany Valenzuela MD at [address]  11/17/2020     PROSTATE NEEDLE PUNCH 11/17/2020 [facility] ULTRASOUND     Family History   Problem Relation Age of Onset    Diabetes Mother     High Blood Pressure Mother     Cancer Father      Outpatient Medications Marked as Taking for the 1/20/21 encounter (Office Visit) with MAURICIO Hawley CNP   Medication Sig Dispense Refill    polyethylene glycol (GLYCOLAX) 17 GM/SCOOP powder Take 17 g by mouth daily as needed (while taking narcotics to avoid hard bowel movements/constipation) 510 g 0    SITagliptin (JANUVIA) 50 MG tablet Take 50 mg by mouth daily      atenolol (TENORMIN) 50 MG tablet Take 50 mg by mouth daily      metFORMIN (GLUCOPHAGE) 1000 MG tablet Take 1,000 mg by mouth 2 times daily (with meals)      amLODIPine (NORVASC) 10 MG tablet Take 10 mg by mouth daily      hydroCHLOROthiazide (HYDRODIURIL) 25 MG tablet Take 25 mg by mouth daily      lisinopril (PRINIVIL;ZESTRIL) 40 MG tablet Take 40 mg by mouth daily      pravastatin (PRAVACHOL) 40 MG tablet Take 40 mg by mouth daily      sildenafil (VIAGRA) 100 MG tablet Take 100 mg by mouth as needed for Erectile Dysfunction         Patient has no known allergies. Social History     Tobacco Use   Smoking Status Never Smoker   Smokeless Tobacco Never Used     (Ifpatient a smoker, smoking cessation counseling offered)    Social History     Substance and Sexual Activity   Alcohol Use Yes    Comment: socially       REVIEW OF SYSTEMS:  Review of Systems    Physical Exam:      Vitals:    01/20/21 1120   BP: 120/80   Pulse: 67   Temp: 98.3 °F (36.8 °C)     Body mass index is 38 kg/m². Patient is a 56 y.o. male in no acute distress and alert and oriented to person, place and time.   Physical Exam  Constitutional: Patient in no acute distress. Neuro: Alert and oriented to person, place and time. Psych: Mood normal, affect normal  Skin: No rash noted  HEENT: Head: Normocephalic andatraumatic  Conjunctivae and EOM are normal. Pupils are equal, round  Nose:Normal  Right External Ear: Normal; Left External Ear: Normal  Mouth: Mucosa Moist  Neck: Supple  Lungs: Respiratory effort is normal  Cardiovascular: Warm & Pink  Abdomen: Soft, non-tender, non-distended  Bladder non-tender and not distended. Musculoskeletal: Normal gait and station      Assessment and Plan      1. S/P prostatectomy    2. Prostate cancer [facility])           Plan:     discussed path with pt/Dr Araya Rising    finish antibiotic    6 weeks PSA    Start kegels and quick flicks todays:    Kegel exercise: squeeze and hold 3 seconds, relax and release 3 seconds, repeat 10-15 times in a row, at lease 6 times  per day    Quick flicks: after kegel, squeeze, release no hold, 12-15 times in a row, at least 6 times per day      No follow-ups on file. Prescriptions Ordered:  No orders of the defined types were placed in this encounter. Orders Placed:  No orders of the defined types were placed in this encounter. Ilene Merlin, APRN - CNP    Reviewed and  Agree with the ROS entered by the MA.

## 2021-02-12 ENCOUNTER — TELEPHONE (OUTPATIENT)
Dept: UROLOGY | Age: 61
End: 2021-02-12

## 2021-02-18 ENCOUNTER — HOSPITAL ENCOUNTER (OUTPATIENT)
Age: 61
Setting detail: SPECIMEN
Discharge: HOME | End: 2021-02-18
Payer: COMMERCIAL

## 2021-02-18 DIAGNOSIS — C61 PROSTATE CANCER (HCC): ICD-10-CM

## 2021-02-18 DIAGNOSIS — Z90.79 S/P PROSTATECTOMY: ICD-10-CM

## 2021-02-18 LAB
ANION GAP SERPL CALCULATED.3IONS-SCNC: 11 MMOL/L (ref 9–17)
BUN BLDV-MCNC: 21 MG/DL (ref 8–23)
BUN/CREAT BLD: ABNORMAL (ref 9–20)
CALCIUM SERPL-MCNC: 9.6 MG/DL (ref 8.6–10.4)
CHLORIDE BLD-SCNC: 102 MMOL/L (ref 98–107)
CO2: 27 MMOL/L (ref 20–31)
CREAT SERPL-MCNC: 1.51 MG/DL (ref 0.7–1.2)
GFR AFRICAN AMERICAN: 57 ML/MIN
GFR NON-AFRICAN AMERICAN: 47 ML/MIN
GFR SERPL CREATININE-BSD FRML MDRD: ABNORMAL ML/MIN/{1.73_M2}
GFR SERPL CREATININE-BSD FRML MDRD: ABNORMAL ML/MIN/{1.73_M2}
GLUCOSE BLD-MCNC: 126 MG/DL (ref 70–99)
POTASSIUM SERPL-SCNC: 3.4 MMOL/L (ref 3.7–5.3)
PROSTATE SPECIFIC ANTIGEN: 0.05 UG/L
SODIUM BLD-SCNC: 140 MMOL/L (ref 135–144)

## 2021-03-16 ENCOUNTER — OFFICE VISIT (OUTPATIENT)
Dept: UROLOGY | Age: 61
End: 2021-03-16

## 2021-03-16 VITALS
WEIGHT: 288 LBS | TEMPERATURE: 98.1 F | BODY MASS INDEX: 38.17 KG/M2 | HEIGHT: 73 IN | HEART RATE: 77 BPM | DIASTOLIC BLOOD PRESSURE: 61 MMHG | SYSTOLIC BLOOD PRESSURE: 114 MMHG

## 2021-03-16 DIAGNOSIS — C61 PROSTATE CANCER (HCC): Primary | ICD-10-CM

## 2021-03-16 PROCEDURE — 99024 POSTOP FOLLOW-UP VISIT: CPT | Performed by: UROLOGY

## 2021-03-16 ASSESSMENT — ENCOUNTER SYMPTOMS
WHEEZING: 0
VOMITING: 0
EYE REDNESS: 0
NAUSEA: 0
COUGH: 0
SHORTNESS OF BREATH: 0
BACK PAIN: 0
ABDOMINAL PAIN: 0
EYE PAIN: 0
DIARRHEA: 0
CONSTIPATION: 0

## 2021-03-16 NOTE — PROGRESS NOTES
[address] [address]  Dept: [address] [facility] Urology Office Note - Established    Patient:  Kareen Taveras  YOB: 1960  Date: 3/16/2021    The patient is a 56 y.o. male who presents todayfor evaluation of the following problems:   Chief Complaint   Patient presents with    Prostate Cancer     6 weeks f/u with PSA \" leaking when I go from a sit to a stand. \"'        HPI  Doing ok, psa is 0.05, has positive margin. Leaking but starting to slowly getting better    Summary of old records: N/A    Additional History: N/A    Procedures Today: N/A    Urinalysis today:  No results found for this visit on 03/16/21. Last several PSA's:  Lab Results   Component Value Date    PSA 0.05 02/18/2021    PSA 15.94 (H) 11/06/2019     Last total testosterone:  No results found for: TESTOSTERONE    AUA Symptom Score (3/16/2021):   INCOMPLETE EMPTYING: How often have you had the sensation of not emptying your bladder?: Not at all  FREQUENCY: How often do you have to urinate less than every two hours?: Less than Half the time  INTERMITTENCY: How often have you found you stopped and started again several times when you urinated?: Less than 1 to 5 times  URGENCY: How often have you found it difficult to postpone urination?: About Half the time  WEAK STREAM: How often have you had a weak urinary stream?: Not at all  STRAINING: How often have you had to strain to start  urination?: Not at all  NOCTURIA: How many times did you typically get up at night to uriniate?: 2 Times  TOTAL I-PSS SCORE[de-identified] 8  How would you feel if you were to spend the rest of your life with your urinary condition?: Unhappy    Last BUN and creatinine:  Lab Results   Component Value Date    BUN 21 02/18/2021     Lab Results   Component Value Date    CREATININE 1.51 (H) 02/18/2021       Additional Lab/Culture results: none    Imaging Reviewed during this Office Visit: none  (results were independently reviewed by physician and radiology report verified)    PAST MEDICAL, FAMILY AND SOCIAL HISTORY UPDATE:  Past Medical History:   Diagnosis Date    Back pain     Diabetes mellitus ([address]     Dyslipidemia     Elevated PSA     Erectile dysfunction     History of echocardiogram 04/2014    EF 55%, mild LVH, mild MR, moderate TR    Hyperlipemia     Hypertension     LVH (left ventricular hypertrophy)     Pedal edema     Prostate cancer ([address]     Systolic murmur     Wellness examination     pcp-phone visit dec 2020     Past Surgical History:   Procedure Laterality Date    HERNIA REPAIR      as baby    PROSTATE BIOPSY N/A 11/17/2020    PROSTATE BIOPSY W/ US & TECH TO OR performed by Imtiaz Vang MD at [address] [place] [address]  01/13/2021    robotic/laprascopic/pelvic lymph node dissection    PROSTATECTOMY N/A 1/13/2021    XI ROBOTIC LAPAROSCOPIC PROSTATECTOMY, PELVIC LYMPH NODE DISSECTION performed by Imtiaz Vang MD at [address] [facility]  11/17/2020    US PROSTATE NEEDLE PUNCH 11/17/2020 [facility] ULTRASOUND     Family History   Problem Relation Age of Onset    Diabetes Mother     High Blood Pressure Mother     Cancer Father      Outpatient Medications Marked as Taking for the 3/16/21 encounter (Office Visit) with Imtiaz Vang MD   Medication Sig Dispense Refill    SITagliptin (JANUVIA) 50 MG tablet Take 50 mg by mouth daily      atenolol (TENORMIN) 50 MG tablet Take 50 mg by mouth daily      metFORMIN (GLUCOPHAGE) 1000 MG tablet Take 1,000 mg by mouth 2 times daily (with meals)      amLODIPine (NORVASC) 10 MG tablet Take 10 mg by mouth daily      hydroCHLOROthiazide (HYDRODIURIL) 25 MG tablet Take 25 mg by mouth daily      lisinopril (PRINIVIL;ZESTRIL) 40 MG tablet Take 40 mg by mouth daily      pravastatin (PRAVACHOL) 40 MG tablet Take 40 mg by mouth daily      sildenafil (VIAGRA) 100 MG tablet Take 100 mg by mouth as needed for Erectile Dysfunction         Patient has no known allergies. Social History     Tobacco Use   Smoking Status Never Smoker   Smokeless Tobacco Never Used     (Ifpatient a smoker, smoking cessation counseling offered)    Social History     Substance and Sexual Activity   Alcohol Use Yes    Comment: socially       REVIEW OF SYSTEMS:  Review of Systems    Physical Exam:      Vitals:    03/16/21 1508   BP: 114/61   Pulse: 77   Temp: 98.1 °F (36.7 °C)     Body mass index is 38 kg/m². Patient is a 56 y.o. male in no acute distress and alert and oriented to person, place and time. Physical Exam  Constitutional: Patient in no acute distress. Neuro: Alert and oriented to person, place and time. Psych: Mood normal, affect normal  Skin: No rash noted  HEENT: Head: Normocephalic andatraumatic  Conjunctivae and EOM are normal. Pupils are equal, round  Nose:Normal  Right External Ear: Normal; Left External Ear: Normal  Mouth: Mucosa Moist  Neck: Supple  Lungs: Respiratory effort is normal  Cardiovascular: Warm & Pink  Abdomen: Soft, non-tender, non-distended with no CVA,  No flank tenderness,  Or hepatosplenomegaly   Lymphatics: No palpablelymphadenopathy. Assessment and Plan      1. Prostate cancer [facility])           Plan:     will need adj radiation therapy  First work on incontinence  pft with shaista  F/u with me in 2motnhs with psa  No follow-ups on file. Prescriptions Ordered:  No orders of the defined types were placed in this encounter. Orders Placed:  Orders Placed This Encounter   Procedures    PSA, Diagnostic     Standing Status:   Future     Standing Expiration Date:   3/16/2022           Buena Riedel, MD    Agree with the ROS entered by the MA.

## 2021-03-17 ENCOUNTER — TELEPHONE (OUTPATIENT)
Dept: UROLOGY | Age: 61
End: 2021-03-17

## 2021-03-18 ENCOUNTER — TELEPHONE (OUTPATIENT)
Dept: UROLOGY | Age: 61
End: 2021-03-18

## 2021-03-18 NOTE — TELEPHONE ENCOUNTER
LVMOM moving appointment Monday from 2pm to 3pm due to no show appointment on 03/18/21 please advise

## 2021-03-22 ENCOUNTER — PROCEDURE VISIT (OUTPATIENT)
Dept: UROLOGY | Age: 61
End: 2021-03-22
Payer: COMMERCIAL

## 2021-03-22 VITALS
HEART RATE: 67 BPM | HEIGHT: 73 IN | BODY MASS INDEX: 38.17 KG/M2 | SYSTOLIC BLOOD PRESSURE: 124 MMHG | WEIGHT: 288 LBS | DIASTOLIC BLOOD PRESSURE: 63 MMHG

## 2021-03-22 DIAGNOSIS — N39.46 MIXED INCONTINENCE: Primary | ICD-10-CM

## 2021-03-22 DIAGNOSIS — C61 PROSTATE CANCER (HCC): ICD-10-CM

## 2021-03-22 DIAGNOSIS — Z90.79 S/P PROSTATECTOMY: ICD-10-CM

## 2021-03-22 PROCEDURE — 97750 PHYSICAL PERFORMANCE TEST: CPT | Performed by: NURSE PRACTITIONER

## 2021-03-22 PROCEDURE — 51784 ANAL/URINARY MUSCLE STUDY: CPT | Performed by: NURSE PRACTITIONER

## 2021-03-22 PROCEDURE — 97032 APPL MODALITY 1+ESTIM EA 15: CPT | Performed by: NURSE PRACTITIONER

## 2021-03-22 NOTE — PROGRESS NOTES
Here for mixed incontinence, post op RRP 1/13/21. He has been doing kegels and quick flicks, his leakage is improving from when the cath was removed 1/20/21. Dr Summer Geller would like him to see Rad Onc- the goal is complete continence prior to starting radiation. Assessed level of understanding of kegel and quick flick with testing, discussed strategies and plan of care. Homework exercise program initiated. Answered many questions, verbalized understanding.

## 2021-04-08 ENCOUNTER — HOSPITAL ENCOUNTER (OUTPATIENT)
Age: 61
Setting detail: SPECIMEN
Discharge: HOME | End: 2021-04-08
Payer: COMMERCIAL

## 2021-04-08 DIAGNOSIS — C61 PROSTATE CANCER (HCC): ICD-10-CM

## 2021-04-08 DIAGNOSIS — Z20.822 COVID-19 RULED OUT BY LABORATORY TESTING: ICD-10-CM

## 2021-04-08 LAB
ANION GAP SERPL CALCULATED.3IONS-SCNC: 12 MMOL/L (ref 9–17)
BUN BLDV-MCNC: 14 MG/DL (ref 8–23)
BUN/CREAT BLD: ABNORMAL (ref 9–20)
CALCIUM SERPL-MCNC: 9.4 MG/DL (ref 8.6–10.4)
CHLORIDE BLD-SCNC: 104 MMOL/L (ref 98–107)
CO2: 28 MMOL/L (ref 20–31)
CREAT SERPL-MCNC: 1.2 MG/DL (ref 0.7–1.2)
GFR AFRICAN AMERICAN: >60 ML/MIN
GFR NON-AFRICAN AMERICAN: >60 ML/MIN
GFR SERPL CREATININE-BSD FRML MDRD: ABNORMAL ML/MIN/{1.73_M2}
GFR SERPL CREATININE-BSD FRML MDRD: ABNORMAL ML/MIN/{1.73_M2}
GLUCOSE BLD-MCNC: 116 MG/DL (ref 70–99)
POTASSIUM SERPL-SCNC: 4 MMOL/L (ref 3.7–5.3)
PROSTATE SPECIFIC ANTIGEN: 0.01 UG/L
SODIUM BLD-SCNC: 144 MMOL/L (ref 135–144)

## 2021-04-13 ENCOUNTER — PROCEDURE VISIT (OUTPATIENT)
Dept: UROLOGY | Age: 61
End: 2021-04-13
Payer: COMMERCIAL

## 2021-04-13 VITALS — TEMPERATURE: 97.6 F

## 2021-04-13 DIAGNOSIS — C61 PROSTATE CANCER (HCC): ICD-10-CM

## 2021-04-13 DIAGNOSIS — N39.46 MIXED INCONTINENCE: Primary | ICD-10-CM

## 2021-04-13 DIAGNOSIS — Z90.79 S/P PROSTATECTOMY: ICD-10-CM

## 2021-04-13 PROCEDURE — 97032 APPL MODALITY 1+ESTIM EA 15: CPT | Performed by: NURSE PRACTITIONER

## 2021-04-13 PROCEDURE — 51784 ANAL/URINARY MUSCLE STUDY: CPT | Performed by: NURSE PRACTITIONER

## 2021-04-13 PROCEDURE — 97750 PHYSICAL PERFORMANCE TEST: CPT | Performed by: NURSE PRACTITIONER

## 2021-04-13 NOTE — PROGRESS NOTES
Here for PFT #2. He has had 20% improvement since starting PFT. His volume of leak is less. He may need radiation in the future, we are working on total continence. Homework given, verbalized understanding.

## 2021-04-21 ENCOUNTER — PROCEDURE VISIT (OUTPATIENT)
Dept: UROLOGY | Age: 61
End: 2021-04-21
Payer: COMMERCIAL

## 2021-04-21 VITALS — BODY MASS INDEX: 36.18 KG/M2 | HEIGHT: 73 IN | TEMPERATURE: 97.6 F | WEIGHT: 273 LBS

## 2021-04-21 DIAGNOSIS — C61 PROSTATE CANCER (HCC): ICD-10-CM

## 2021-04-21 DIAGNOSIS — Z90.79 S/P PROSTATECTOMY: ICD-10-CM

## 2021-04-21 DIAGNOSIS — N39.46 MIXED INCONTINENCE: Primary | ICD-10-CM

## 2021-04-21 PROCEDURE — 97750 PHYSICAL PERFORMANCE TEST: CPT | Performed by: NURSE PRACTITIONER

## 2021-04-21 PROCEDURE — 51784 ANAL/URINARY MUSCLE STUDY: CPT | Performed by: NURSE PRACTITIONER

## 2021-04-21 PROCEDURE — 97032 APPL MODALITY 1+ESTIM EA 15: CPT | Performed by: NURSE PRACTITIONER

## 2021-04-21 NOTE — PROGRESS NOTES
PFt 3- 75% improvement. 1 brief, rare leak, small dribbles. Getting ready for possible radiation- will continue to 100% improvement. Has f/u PSa and visit with Dr Bernadette Laboy 5/18/21. Homework given, verbalizes understanding.

## 2021-04-27 ENCOUNTER — HOSPITAL ENCOUNTER (OUTPATIENT)
Age: 61
Setting detail: SPECIMEN
Discharge: HOME | End: 2021-04-27
Payer: COMMERCIAL

## 2021-04-27 LAB
CREATININE URINE: 111.8 MG/DL (ref 39–259)
TOTAL PROTEIN, URINE: 7 MG/DL
URINE TOTAL PROTEIN CREATININE RATIO: 0.06 (ref 0–0.2)

## 2021-04-29 ENCOUNTER — PROCEDURE VISIT (OUTPATIENT)
Dept: UROLOGY | Age: 61
End: 2021-04-29
Payer: COMMERCIAL

## 2021-04-29 VITALS
HEIGHT: 73 IN | TEMPERATURE: 97 F | DIASTOLIC BLOOD PRESSURE: 73 MMHG | BODY MASS INDEX: 36.18 KG/M2 | WEIGHT: 273 LBS | SYSTOLIC BLOOD PRESSURE: 128 MMHG | HEART RATE: 61 BPM

## 2021-04-29 DIAGNOSIS — C61 PROSTATE CANCER (HCC): ICD-10-CM

## 2021-04-29 DIAGNOSIS — Z90.79 S/P PROSTATECTOMY: ICD-10-CM

## 2021-04-29 DIAGNOSIS — N39.46 MIXED INCONTINENCE: Primary | ICD-10-CM

## 2021-04-29 PROCEDURE — 97750 PHYSICAL PERFORMANCE TEST: CPT | Performed by: NURSE PRACTITIONER

## 2021-04-29 PROCEDURE — 97032 APPL MODALITY 1+ESTIM EA 15: CPT | Performed by: NURSE PRACTITIONER

## 2021-04-29 PROCEDURE — 51784 ANAL/URINARY MUSCLE STUDY: CPT | Performed by: NURSE PRACTITIONER

## 2021-04-29 NOTE — PROGRESS NOTES
Here for f/u on incontinence. PFT #4: 85% improvement, 1 Pad per day. Plan: total continence for possible Radiation therapy.      Average PF tone: 19.2  Stim amplitude: 49 mA  Time: 10.51 minutes

## 2021-05-05 ENCOUNTER — PROCEDURE VISIT (OUTPATIENT)
Dept: UROLOGY | Age: 61
End: 2021-05-05
Payer: COMMERCIAL

## 2021-05-05 VITALS
SYSTOLIC BLOOD PRESSURE: 138 MMHG | WEIGHT: 273 LBS | DIASTOLIC BLOOD PRESSURE: 83 MMHG | TEMPERATURE: 96.8 F | HEART RATE: 69 BPM | BODY MASS INDEX: 36.18 KG/M2 | HEIGHT: 73 IN

## 2021-05-05 DIAGNOSIS — C61 PROSTATE CANCER (HCC): ICD-10-CM

## 2021-05-05 DIAGNOSIS — N39.46 MIXED INCONTINENCE: Primary | ICD-10-CM

## 2021-05-05 DIAGNOSIS — Z90.79 S/P PROSTATECTOMY: ICD-10-CM

## 2021-05-05 PROCEDURE — 97032 APPL MODALITY 1+ESTIM EA 15: CPT | Performed by: NURSE PRACTITIONER

## 2021-05-05 PROCEDURE — 97750 PHYSICAL PERFORMANCE TEST: CPT | Performed by: NURSE PRACTITIONER

## 2021-05-05 PROCEDURE — 51784 ANAL/URINARY MUSCLE STUDY: CPT | Performed by: NURSE PRACTITIONER

## 2021-05-13 ENCOUNTER — PROCEDURE VISIT (OUTPATIENT)
Dept: UROLOGY | Age: 61
End: 2021-05-13
Payer: COMMERCIAL

## 2021-05-13 VITALS — TEMPERATURE: 96.4 F | BODY MASS INDEX: 36.18 KG/M2 | WEIGHT: 273 LBS | HEIGHT: 73 IN

## 2021-05-13 DIAGNOSIS — N39.46 MIXED INCONTINENCE: ICD-10-CM

## 2021-05-13 DIAGNOSIS — Z90.79 S/P PROSTATECTOMY: Primary | ICD-10-CM

## 2021-05-13 DIAGNOSIS — C61 PROSTATE CANCER (HCC): ICD-10-CM

## 2021-05-13 PROCEDURE — 97750 PHYSICAL PERFORMANCE TEST: CPT | Performed by: NURSE PRACTITIONER

## 2021-05-13 PROCEDURE — 51784 ANAL/URINARY MUSCLE STUDY: CPT | Performed by: NURSE PRACTITIONER

## 2021-05-13 PROCEDURE — 97032 APPL MODALITY 1+ESTIM EA 15: CPT | Performed by: NURSE PRACTITIONER

## 2021-05-18 ENCOUNTER — HOSPITAL ENCOUNTER (OUTPATIENT)
Age: 61
Setting detail: SPECIMEN
Discharge: HOME | End: 2021-05-18
Payer: COMMERCIAL

## 2021-05-18 DIAGNOSIS — Z90.79 S/P PROSTATECTOMY: ICD-10-CM

## 2021-05-18 DIAGNOSIS — C61 PROSTATE CANCER (HCC): ICD-10-CM

## 2021-05-18 LAB — PROSTATE SPECIFIC ANTIGEN: 0.02 UG/L

## 2021-06-03 ENCOUNTER — OFFICE VISIT (OUTPATIENT)
Dept: UROLOGY | Age: 61
End: 2021-06-03
Payer: COMMERCIAL

## 2021-06-03 VITALS
WEIGHT: 273 LBS | DIASTOLIC BLOOD PRESSURE: 89 MMHG | TEMPERATURE: 96.3 F | HEIGHT: 73 IN | BODY MASS INDEX: 36.18 KG/M2 | SYSTOLIC BLOOD PRESSURE: 140 MMHG | HEART RATE: 63 BPM

## 2021-06-03 DIAGNOSIS — N52.01 ERECTILE DYSFUNCTION DUE TO ARTERIAL INSUFFICIENCY: ICD-10-CM

## 2021-06-03 DIAGNOSIS — N39.46 MIXED INCONTINENCE: ICD-10-CM

## 2021-06-03 DIAGNOSIS — Z90.79 S/P PROSTATECTOMY: Primary | ICD-10-CM

## 2021-06-03 DIAGNOSIS — C61 PROSTATE CANCER (HCC): ICD-10-CM

## 2021-06-03 PROCEDURE — 99214 OFFICE O/P EST MOD 30 MIN: CPT | Performed by: UROLOGY

## 2021-06-03 RX ORDER — TADALAFIL 5 MG
5 TABLET ORAL DAILY
Qty: 40 TABLET | Refills: 11 | Status: SHIPPED | OUTPATIENT
Start: 2021-06-03 | End: 2022-09-23

## 2021-06-03 ASSESSMENT — ENCOUNTER SYMPTOMS
EYES NEGATIVE: 1
BACK PAIN: 0
VOMITING: 0
DIARRHEA: 0
SHORTNESS OF BREATH: 0
EYE PAIN: 0
RESPIRATORY NEGATIVE: 1
WHEEZING: 0
CONSTIPATION: 0
GASTROINTESTINAL NEGATIVE: 1
NAUSEA: 0
ABDOMINAL PAIN: 0
EYE REDNESS: 0
COUGH: 0

## 2021-06-03 NOTE — PROGRESS NOTES
Review of Systems   Constitutional: Negative. Negative for appetite change, chills and fatigue. Eyes: Negative. Negative for pain, redness and visual disturbance. Respiratory: Negative. Negative for cough, shortness of breath and wheezing. Cardiovascular: Negative. Negative for chest pain and leg swelling. Gastrointestinal: Negative. Negative for abdominal pain, constipation, diarrhea, nausea and vomiting. Genitourinary: Negative. Negative for difficulty urinating, dysuria, flank pain, frequency, hematuria and urgency. Musculoskeletal: Negative. Negative for back pain, joint swelling and myalgias. Skin: Negative. Negative for rash and wound. Neurological: Negative. Negative for dizziness, weakness and numbness. Hematological: Negative. Does not bruise/bleed easily.

## 2021-06-03 NOTE — PROGRESS NOTES
[address] [address]  Dept: [address] [facility] Urology Office Note - Established    Patient:  Daquan Kang  YOB: 1960  Date: 6/3/2021    The patient is a 56 y.o. male who presents todayfor evaluation of the following problems:   Chief Complaint   Patient presents with    Follow-up     2 month, PSA       HPI  Here for prostate cancer follow up RRP- 1/13/21. He is using 1 PPD, some days he is completely dry. Has complete PFT 6 visits. He feels he empties his bladder well, nocturia x1, stream steady. ED: he has sensation but no filling. He was using Viagra pre-op. Summary of old records: N/A    Additional History: N/A    Procedures Today: N/A    Urinalysis today:  No results found for this visit on 06/03/21. Last several PSA's:  Lab Results   Component Value Date    PSA 0.02 05/18/2021    PSA 0.01 04/08/2021    PSA 0.05 02/18/2021     Last total testosterone:  No results found for: TESTOSTERONE    AUA Symptom Score (6/3/2021):                               Last BUN and creatinine:  Lab Results   Component Value Date    BUN 14 04/08/2021     Lab Results   Component Value Date    CREATININE 1.20 04/08/2021       Additional Lab/Culture results:   1/18/2021  8:39 AM - WilmanJacqueline Incoming Lab Results From [facility]    Component Collected Lab   Surgical Pathology Report 01/13/2021  7:[address]   -- Diagnosis --   1.  PROSTATE AND SEMINAL VESICLES, RADICAL PROSTATECTOMY:-PROSTATIC   ACINAR ADENOCARCINOMA, TIFFANIE SCORE 3 + 4 = 7 (GROUP GRADE 2), WITH   INVASION OF URINARY BLADDER NECK, AND EXTENDING TO THE RIGHT APICAL   AND RIGHT APICAL ANTERIOR MARGINS AT MULTIPLE FOCI.     2.  BILATERAL PELVIC LYMPH NODES, EXCISION:-SIX (6) LYMPH NODES   NEGATIVE FOR CARCINOMA.        Shanice Sanchez M.D   **Electronically Signed Out**         shonna/1/15/2021          Imaging Reviewed during this Office Visit: none  (results were independently reviewed by physician and radiology report verified)    PAST MEDICAL, FAMILY AND SOCIAL HISTORY UPDATE:  Past Medical History:   Diagnosis Date    Back pain     Diabetes mellitus ([address]     Dyslipidemia     Elevated PSA     Erectile dysfunction     History of echocardiogram 04/2014    EF 55%, mild LVH, mild MR, moderate TR    Hyperlipemia     Hypertension     LVH (left ventricular hypertrophy)     Pedal edema     Prostate cancer ([address]     Systolic murmur     Wellness examination     pcp-phone visit dec 2020     Past Surgical History:   Procedure Laterality Date    HERNIA REPAIR      as baby    PROSTATE BIOPSY N/A 11/17/2020    PROSTATE BIOPSY W/ US & TECH TO OR performed by Pedro Duncan MD at [address]  01/13/2021    robotic/laprascopic/pelvic lymph node dissection    PROSTATECTOMY N/A 1/13/2021    XI ROBOTIC LAPAROSCOPIC PROSTATECTOMY, PELVIC LYMPH NODE DISSECTION performed by Pedro Duncan MD at [address] [facility]  11/17/2020    US PROSTATE NEEDLE PUNCH 11/17/2020 [facility] ULTRASOUND     Family History   Problem Relation Age of Onset    Diabetes Mother     High Blood Pressure Mother     Cancer Father      Outpatient Medications Marked as Taking for the 6/3/21 encounter (Office Visit) with Pedro Duncan MD   Medication Sig Dispense Refill    CIALIS 5 MG tablet Take 1 tablet by mouth daily 40 tablet 11    SITagliptin (JANUVIA) 50 MG tablet Take 50 mg by mouth daily      atenolol (TENORMIN) 50 MG tablet Take 50 mg by mouth daily      metFORMIN (GLUCOPHAGE) 1000 MG tablet Take 1,000 mg by mouth 2 times daily (with meals)      amLODIPine (NORVASC) 10 MG tablet Take 10 mg by mouth daily      hydroCHLOROthiazide (HYDRODIURIL) 25 MG tablet Take 25 mg by mouth daily      lisinopril (PRINIVIL;ZESTRIL) 40 MG tablet Take 40 mg by mouth daily      pravastatin (PRAVACHOL) 40 MG tablet Take 40 mg by mouth daily Patient has no known allergies. Social History     Tobacco Use   Smoking Status Never Smoker   Smokeless Tobacco Never Used     (Ifpatient a smoker, smoking cessation counseling offered)    Social History     Substance and Sexual Activity   Alcohol Use Yes    Comment: socially       REVIEW OF SYSTEMS:  Review of Systems    Physical Exam:      Vitals:    06/03/21 0919   BP: (!) 140/89   Pulse: 63   Temp: 96.3 °F (35.7 °C)     Body mass index is 36.02 kg/m². Patient is a 56 y.o. male in no acute distress and alert and oriented to person, place and time. Physical Exam  Constitutional: Patient in no acute distress. Neuro: Alert and oriented to person, place and time. Psych: Mood normal, affect normal  Skin: No rash noted  HEENT: Head: Normocephalic andatraumatic  Conjunctivae and EOM are normal. Pupils are equal, round  Nose:Normal  Right External Ear: Normal; Left External Ear: Normal  Mouth: Mucosa Moist  Neck: Supple  Lungs: Respiratory effort is normal  Cardiovascular: Warm & Pink  Abdomen: Soft, non-tender, non-distended   Bladder non-tender and not distended. Musculoskeletal: Normal gait and station      Assessment and Plan      1. S/P prostatectomy    2. Prostate cancer ([facility]    3. Mixed incontinence    4. Erectile dysfunction due to arterial insufficiency           Plan:     needs Radiation, ED- not at goal     Continue kegels and quick flicks     Start Cialis 5 mg daily, if planning activity may skip  morning dose and try 20 mg ( 4 5 mg pills ) 45 minutes prior to activity      PSA 3 months    Radiation Oncology referral     Call with questions or cocerns. No follow-ups on file.     Prescriptions Ordered:  Orders Placed This Encounter   Medications    CIALIS 5 MG tablet     Sig: Take 1 tablet by mouth daily     Dispense:  40 tablet     Refill:  11     Orders Placed:  Orders Placed This Encounter   Procedures    PSA, Diagnostic     Standing Status:   Future     Standing Expiration Date:   6/3/2022   Ruperto Lee [facility] Radiation Oncology     Referral Priority:   Routine     Referral Type:   Eval and Treat     Referral Reason:   Specialty Services Required     Requested Specialty:   Radiation Oncology     Number of Visits Requested:   Rosio Fallon MD    Reviewed and Agree with the ROS entered by the MA.

## 2021-06-03 NOTE — PATIENT INSTRUCTIONS
Here for prostate cancer follow up RRP- 1/13/21. He is using 1 PPD, some days he is completely dry. Has complete PFT 6 visits. ED: he has sensation but no filling. He was using Viagra pre-op. Start Cialis 5 mg daily, if planning activity may skip  morning dose and try 20 mg ( 4 5 mg pills ) 45 minutes prior to activity    PSA 3 months    Radiation Oncology referral     Call with questions or cocerns.

## 2021-06-04 ENCOUNTER — TELEPHONE (OUTPATIENT)
Dept: UROLOGY | Age: 61
End: 2021-06-04

## 2021-06-04 NOTE — TELEPHONE ENCOUNTER
Pt would like to speak to Mukul regarding medication not having a discount with good rx card.  Informed pt will inform Mukul CNP

## 2021-06-07 ENCOUNTER — TELEPHONE (OUTPATIENT)
Dept: RADIATION ONCOLOGY | Age: 61
End: 2021-06-07

## 2021-06-15 ENCOUNTER — HOSPITAL ENCOUNTER (OUTPATIENT)
Dept: RADIATION ONCOLOGY | Age: 61
Discharge: HOME | End: 2021-06-15
Payer: COMMERCIAL

## 2021-06-15 DIAGNOSIS — C61 PROSTATE CANCER (HCC): Primary | ICD-10-CM

## 2021-06-15 PROCEDURE — 99205 OFFICE O/P NEW HI 60 MIN: CPT | Performed by: STUDENT IN AN ORGANIZED HEALTH CARE EDUCATION/TRAINING PROGRAM

## 2021-06-15 PROCEDURE — 99213 OFFICE O/P EST LOW 20 MIN: CPT | Performed by: STUDENT IN AN ORGANIZED HEALTH CARE EDUCATION/TRAINING PROGRAM

## 2021-06-15 NOTE — PROGRESS NOTES
Referring Physician: Dr Myron Stallworth       There were no vitals filed for this visit. :                Wt Readings from Last 1 Encounters:   21 273 lb (123.8 kg)        There is no height or weight on file to calculate BMI. Immunizations:    Influenza status:    []   Current   [x]   Patient declined    Pneumococcal status:  []   Current  []   Patient declined  Covid status:   []  Dose #1:                     []  Dose #2:               [x]   Patient declined    Smoking Status:    [] Smoker - PPD:   [] Nonsmoker - Quit Date:               [x] Never a smoker      No chief complaint on file. Cancer Staging  No matching staging information was found for the patient. Prior Radiation Therapy? No   If yes, site treated:   Facility:                             Date:    Concurrent Chemo/radiation? No   If yes, start date:    Prior Chemotherapy? No   If yes    Facility:                             Date:    Prior Hormonal Therapy? No   If yes   Facility:                             Date:    Head and Neck Cancer? No   If yes, please remind physician to place swallow study order and speech therapy order.       Pacemaker/Defibulator/ICD:  No             BREAST/GYN  Patient only:     LMP:    Age at first Menses:    Para:    :    Cup size:    Lymphedema Evaluation[de-identified]   [] left arm      [] right arm  Location:     Measurement (cm)    Upper Bicep :    Lower Bicep :           Current Outpatient Medications   Medication Sig Dispense Refill    CIALIS 5 MG tablet Take 1 tablet by mouth daily 40 tablet 11    SITagliptin (JANUVIA) 50 MG tablet Take 50 mg by mouth daily      atenolol (TENORMIN) 50 MG tablet Take 50 mg by mouth daily      metFORMIN (GLUCOPHAGE) 1000 MG tablet Take 1,000 mg by mouth 2 times daily (with meals)      amLODIPine (NORVASC) 10 MG tablet Take 10 mg by mouth daily      hydroCHLOROthiazide (HYDRODIURIL) 25 MG tablet Take 25 mg by mouth daily      lisinopril (PRINIVIL;ZESTRIL) 40 MG tablet Take 40 mg by mouth daily      pravastatin (PRAVACHOL) 40 MG tablet Take 40 mg by mouth daily       No current facility-administered medications for this encounter.        Past Medical History:   Diagnosis Date    Back pain     Diabetes mellitus (HCC)     Dyslipidemia     Elevated PSA     Erectile dysfunction     History of echocardiogram 04/2014    EF 55%, mild LVH, mild MR, moderate TR    Hyperlipemia     Hypertension     LVH (left ventricular hypertrophy)     Pedal edema     Prostate cancer (HCC)     Systolic murmur     Wellness examination     pcp-phone visit dec 2020       Past Surgical History:   Procedure Laterality Date    HERNIA REPAIR      as baby    PROSTATE BIOPSY N/A 11/17/2020    PROSTATE BIOPSY W/ US & TECH TO OR performed by Gideon Marvin MD at [address] [facility]  01/13/2021    robotic/laprascopic/pelvic lymph node dissection    PROSTATECTOMY N/A 1/13/2021    XI ROBOTIC LAPAROSCOPIC PROSTATECTOMY, PELVIC LYMPH NODE DISSECTION performed by Gideon Marvin MD at [address]  11/17/2020    US PROSTATE NEEDLE PUNCH 11/17/2020 [facility] ULTRASOUND       Family History   Problem Relation Age of Onset    Diabetes Mother     High Blood Pressure Mother     Cancer Father        Social History     Socioeconomic History    Marital status: Single     Spouse name: Not on file    Number of children: Not on file    Years of education: Not on file    Highest education level: Not on file   Occupational History    Not on file   Tobacco Use    Smoking status: Never Smoker    Smokeless tobacco: Never Used   Vaping Use    Vaping Use: Never used   Substance and Sexual Activity    Alcohol use: Yes     Comment: socially    Drug use: Never    Sexual activity: Yes   Other Topics Concern    Not on file   Social History Narrative    Not on file     Social Determinants of Health     Financial Resource Strain:     Difficulty of Paying Living Expenses:    Food Insecurity:     Worried About Running Out of Food in the Last Year:     Ran Out of Food in the Last Year:    Transportation Needs:     Lack of Transportation (Medical):     Lack of Transportation (Non-Medical):    Physical Activity:     Days of Exercise per Week:     Minutes of Exercise per Session:    Stress:     Feeling of Stress :    Social Connections:     Frequency of Communication with Friends and Family:     Frequency of Social Gatherings with Friends and Family:     Attends Rastafarian Services: Active Member of Clubs or Organizations:     Attends Club or Organization Meetings:     Marital Status:    Intimate Partner Violence:     Fear of Current or Ex-Partner:     Emotionally Abused:     Physically Abused:     Sexually Abused:              FALLS RISK SCREEN  Instructions:  Assess the patient and enter the appropriate indicators that are present for fall risk identification. Total the numbers entered and assign a fall risk score from Table 2.  Reassess patient at a minimum every 12 weeks or with status change. Assessment   Date  6/15/2021     1. Mental Ability: confusion/cognitively impaired 0     2. Elimination Issues: incontinence, frequency 0       3. Ambulatory: use of assistive devices (walker, cane, off-loading devices),        attached to equipment (IV pole, oxygen) 0     4. Sensory Limitations: dizziness, vertigo, impaired vision 0     5. Age less than 65        0     6. Age 72 or greater 0     7. Medication: diuretics, strong analgesics, hypnotics, sedatives,        antihypertensive agents 3   8. Falls:  recent history of falls within the last 3 months (not to include slipping or        tripping) 0   TOTAL 3    If score of 4 or greater was education given? No       TABLE 2   Risk Score Risk Level Plan of Care   0-3 Little or  No Risk 1. Provide assistance as indicated for ambulation activities  2. Reorient confused/cognitively impaired patient  3. Call-light/bell within patient's reach  4.   Chair/bed in low position, stretcher/bed with siderails up except when performing patient care activities  5. Educate patient/family/caregiver on falls prevention  6.  Reassess in 12 weeks or with any noted change in patient condition which places them at a risk for a fall   4-6 Moderate Risk 1. Provide assistance as indicated for ambulation activities  2. Reorient confused/cognitively impaired patient  3. Call-light/bell within patient's reach  4. Chair/bed in low position, stretcher/bed with siderails up except when performing patient care activities  5. Educate patient/family/caregiver on falls prevention     7 or   Higher High Risk 1. Place patient in easily observable treatment room  2. Patient attended at all times by family member or staff  3. Provide assistance as indicated for ambulation activities  4. Reorient confused/cognitively impaired patient  5. Call-light/bell within patient's reach  6. Chair/bed in low position, stretcher/bed with siderails up except when performing patient care activities  7. Educate patient/family/caregiver on falls prevention             Assessment/Plan: Patient was seen today for consultation. Dr Marcin Stuart notified and examined pt. Pt will return in 3 months with a PSA prior and be reevaluated.           Lauri Macias RN 6/15/2021 11:02 AM

## 2021-06-15 NOTE — CONSULTS
[address]   Radiation Oncology     [address] [facility]    [place] [place]    [place]: [phone]    F: [phone]    [facility]. [facility]           RADIATION ONCOLOGY NEW PATIENT VISIT:    Patient: Sundeep Stern   YOB: 1960   MRN:  [id number]     Date of Service: 6/15/2021    Referring Provider for today's consult: Thang Rosa MD    CHIEF COMPLAINT: \" Prostate cancer\"    DIAGNOSIS: Sundeep Stern is a 56 y.o. male with pathologic stage IIIB (pT3a N0 M0, PSA 15.9, [place] 3+4) status post robotic prostatectomy on 1/13/2021. Pathology from this revealed a pT3aN0 prostate adenocarcinoma, Bet[place] 3+4, with a multifocal positive margin at the right apical and apical anterior margin, with urinary bladder neck invasion, and 0/6 pelvic lymph nodes positive for carcinoma. Postoperatively, his PSA was 0.05 on 2/18/2021. It then decreased to 0.01 on 4/8/2021, and most recently it was 0.02 on 5/18/2021. Cancer Staging  Prostate cancer [facility])  Staging form: Prostate, AJCC 8th Edition  - Pathologic stage from 1/13/2021: Stage IIIB (pT3a, pN0, cM0, PSA: 15.9, Grade Group: 2) - Signed by Ama Castellon MD on 6/15/2021    HISTORY OF PRESENT ILLNESS:     This is a very pleasant 78-year-old male who presented to urology around October 2020 with an elevated PSA. It was 15.94 on 11/6/2019. Patient underwent a transurethral ultrasound-guided prostate biopsy on 11/17/2020. Pathology from this procedure revealed prostate-carcinoma, highest [place] score of 8, with involvement of 15/16 cores. He underwent staging imaging with a bone scan and CT abdomen pelvis on 12/9/2020. The bone scan revealed a \"midline frontal skull activity. .. less likely metastatic disease given location and lack of widespread findings elsewhere. \"  CT scan did not reveal any evidence for metastatic disease in the abdomen or pelvis.     He then underwent a robotic laparoscopic prostatectomy and pelvic lymph node dissection on 1/13/2021. Pathology from this revealed a pT3aN0 prostate adenocarcinoma, Betina 3+4, with a multifocal positive margin at the right apical and apical anterior margin, with urinary bladder neck invasion, and 0/6 pelvic lymph nodes positive for carcinoma. Postoperatively, his PSA was 0.05 on 2/18/2021. It then decreased to 0.01 on 4/8/2021, and most recently it was 0.02 on 5/18/2021. He was therefore referred to radiation oncology to discuss adjuvant treatment options. Current symptoms:    Patient reports that his incontinence is resolving. He uses 1 pad per day. His sexual function is diminished. He has noticed softened erections. He is using Viagra with some relief. He has normal bowel movements. He denies any bone pain or weight loss. PRIOR RADIATION HISTORY:  No prior history of radiation therapy    PACEMAKER: None    PAST MEDICAL HISTORY:    Javed Moncada  has a past medical history of Back pain, Diabetes mellitus ([address] Dyslipidemia, Elevated PSA, Erectile dysfunction, History of echocardiogram (04/2014), Hyperlipemia, Hypertension, LVH (left ventricular hypertrophy), Pedal edema, Prostate cancer ([address] Systolic murmur, and Wellness examination. PAST SURGICAL HISTORY:    Javed Moncada  has a past surgical history that includes Hernia repair; Prostate biopsy (N/A, 11/17/2020); US BIOPSY PROSTATE NEEDLE/PUNCH (11/17/2020); Prostatectomy (01/13/2021); and Prostatectomy (N/A, 1/13/2021).     MEDICATIONS:  Current Outpatient Medications on File Prior to Encounter   Medication Sig Dispense Refill    CIALIS 5 MG tablet Take 1 tablet by mouth daily 40 tablet 11    SITagliptin (JANUVIA) 50 MG tablet Take 50 mg by mouth daily      atenolol (TENORMIN) 50 MG tablet Take 50 mg by mouth daily      metFORMIN (GLUCOPHAGE) 1000 MG tablet Take 1,000 mg by mouth 2 times daily (with meals)      amLODIPine (NORVASC) 10 MG tablet Take 10 mg by mouth daily      hydroCHLOROthiazide (HYDRODIURIL) 25 MG tablet Take 25 mg by mouth daily      lisinopril (PRINIVIL;ZESTRIL) 40 MG tablet Take 40 mg by mouth daily      pravastatin (PRAVACHOL) 40 MG tablet Take 40 mg by mouth daily       No current facility-administered medications on file prior to encounter. ALLERGIES:   Patient has no known allergies. SOCIAL HISTORY:  Nisreen Barth  reports that he has never smoked. He has never used smokeless tobacco. He reports current alcohol use. He reports that he does not use drugs. FAMILY HISTORY:  Nisreen Barth family history includes Cancer in his father; Diabetes in his mother; High Blood Pressure in his mother. REVIEW OF SYSTEMS:    A complete 14 review of systems was was negative except for positives mentioned in the history of present illness (HPI). PHYSICAL EXAMINATION:    CHAPERONE: Not Required    ECO Asymptomatic  CONSTITUTIONAL: Well developed, well nourished male. Alert and oriented x3. No acute distress. HEENT: Head normocephalic and atraumatic. Extraocular movements intact. NEUROLOGIC EXAM: Cranial nerves II through XII grossly intact. No focal neurologic deficit. Speech is fluent. Gait and posture are steady. PSYCHIATRIC:  Appropriate mood and affect for his clinical situation. ASSESSMENT AND PLAN:     I discussed with Mr. Yary Salas his diagnosis and the role of external beam radiation therapy (EBRT) after prostatectomy. This included a conversation about the concepts of close observation versus adjuvant versus early initiation of salvage radiation when PSA rises. Based on all the evidence we have so far including his serum PSA and radiological tests I am recommending surveillance of his PSAs. If it continues to rise, including above the threshold of 0.1 or 0.2, we can initiate early salvage radiation treatment at that time. This will allow him to continue to heal from his prostatectomy without adversely affecting his overall oncologic outcome.     I explained in

## 2021-07-20 ENCOUNTER — HOSPITAL ENCOUNTER (OUTPATIENT)
Age: 61
Setting detail: SPECIMEN
Discharge: HOME | End: 2021-07-20
Payer: COMMERCIAL

## 2021-07-20 DIAGNOSIS — C61 PROSTATE CANCER (HCC): ICD-10-CM

## 2021-07-20 LAB
ANION GAP SERPL CALCULATED.3IONS-SCNC: 17 MMOL/L (ref 9–17)
BUN BLDV-MCNC: 29 MG/DL (ref 8–23)
BUN/CREAT BLD: ABNORMAL (ref 9–20)
CALCIUM SERPL-MCNC: 9.5 MG/DL (ref 8.6–10.4)
CHLORIDE BLD-SCNC: 100 MMOL/L (ref 98–107)
CO2: 22 MMOL/L (ref 20–31)
CREAT SERPL-MCNC: 1.9 MG/DL (ref 0.7–1.2)
CREATININE URINE: 422.3 MG/DL (ref 39–259)
GFR AFRICAN AMERICAN: 44 ML/MIN
GFR NON-AFRICAN AMERICAN: 36 ML/MIN
GFR SERPL CREATININE-BSD FRML MDRD: ABNORMAL ML/MIN/{1.73_M2}
GFR SERPL CREATININE-BSD FRML MDRD: ABNORMAL ML/MIN/{1.73_M2}
GLUCOSE BLD-MCNC: 84 MG/DL (ref 70–99)
POTASSIUM SERPL-SCNC: 4.1 MMOL/L (ref 3.7–5.3)
PROSTATE SPECIFIC ANTIGEN: 0.02 UG/L
SODIUM BLD-SCNC: 139 MMOL/L (ref 135–144)
TOTAL PROTEIN, URINE: 27 MG/DL

## 2021-07-26 ENCOUNTER — HOSPITAL ENCOUNTER (OUTPATIENT)
Age: 61
Discharge: HOME | End: 2021-07-26
Payer: COMMERCIAL

## 2021-07-26 DIAGNOSIS — N17.9 AKI (ACUTE KIDNEY INJURY) (HCC): ICD-10-CM

## 2021-07-26 LAB
ANION GAP SERPL CALCULATED.3IONS-SCNC: 15 MMOL/L (ref 9–17)
BUN BLDV-MCNC: 26 MG/DL (ref 8–23)
BUN/CREAT BLD: ABNORMAL (ref 9–20)
CALCIUM SERPL-MCNC: 9.5 MG/DL (ref 8.6–10.4)
CHLORIDE BLD-SCNC: 106 MMOL/L (ref 98–107)
CO2: 21 MMOL/L (ref 20–31)
CREAT SERPL-MCNC: 1.53 MG/DL (ref 0.7–1.2)
GFR AFRICAN AMERICAN: 56 ML/MIN
GFR NON-AFRICAN AMERICAN: 46 ML/MIN
GFR SERPL CREATININE-BSD FRML MDRD: ABNORMAL ML/MIN/{1.73_M2}
GFR SERPL CREATININE-BSD FRML MDRD: ABNORMAL ML/MIN/{1.73_M2}
GLUCOSE BLD-MCNC: 111 MG/DL (ref 70–99)
POTASSIUM SERPL-SCNC: 4.3 MMOL/L (ref 3.7–5.3)
SODIUM BLD-SCNC: 142 MMOL/L (ref 135–144)

## 2021-07-26 PROCEDURE — 80048 BASIC METABOLIC PNL TOTAL CA: CPT

## 2021-07-26 PROCEDURE — 36415 COLL VENOUS BLD VENIPUNCTURE: CPT

## 2021-08-16 ENCOUNTER — HOSPITAL ENCOUNTER (OUTPATIENT)
Dept: NON INVASIVE DIAGNOSTICS | Age: 61
Discharge: HOME | End: 2021-08-16
Payer: COMMERCIAL

## 2021-08-16 DIAGNOSIS — R01.1 MURMUR: ICD-10-CM

## 2021-08-16 LAB
LV EF: 60 %
LVEF MODALITY: NORMAL

## 2021-08-16 PROCEDURE — 93306 TTE W/DOPPLER COMPLETE: CPT

## 2021-09-07 ENCOUNTER — HOSPITAL ENCOUNTER (OUTPATIENT)
Age: 61
Setting detail: SPECIMEN
Discharge: HOME | End: 2021-09-07
Payer: COMMERCIAL

## 2021-09-07 DIAGNOSIS — N17.9 AKI (ACUTE KIDNEY INJURY) (HCC): ICD-10-CM

## 2021-09-07 LAB
ABSOLUTE EOS #: 0.09 K/UL (ref 0–0.44)
ABSOLUTE IMMATURE GRANULOCYTE: <0.03 K/UL (ref 0–0.3)
ABSOLUTE LYMPH #: 2.34 K/UL (ref 1.1–3.7)
ABSOLUTE MONO #: 0.7 K/UL (ref 0.1–1.2)
ANION GAP SERPL CALCULATED.3IONS-SCNC: 16 MMOL/L (ref 9–17)
BASOPHILS # BLD: 0 % (ref 0–2)
BASOPHILS ABSOLUTE: 0.03 K/UL (ref 0–0.2)
BUN BLDV-MCNC: 13 MG/DL (ref 8–23)
BUN/CREAT BLD: ABNORMAL (ref 9–20)
CALCIUM IONIZED: 1.24 MMOL/L (ref 1.13–1.33)
CALCIUM SERPL-MCNC: 9.1 MG/DL (ref 8.6–10.4)
CHLORIDE BLD-SCNC: 104 MMOL/L (ref 98–107)
CO2: 21 MMOL/L (ref 20–31)
CREAT SERPL-MCNC: 1.46 MG/DL (ref 0.7–1.2)
CREATININE URINE: 157.1 MG/DL (ref 39–259)
DIFFERENTIAL TYPE: ABNORMAL
EOSINOPHILS RELATIVE PERCENT: 1 % (ref 1–4)
GFR AFRICAN AMERICAN: 59 ML/MIN
GFR NON-AFRICAN AMERICAN: 49 ML/MIN
GFR SERPL CREATININE-BSD FRML MDRD: ABNORMAL ML/MIN/{1.73_M2}
GFR SERPL CREATININE-BSD FRML MDRD: ABNORMAL ML/MIN/{1.73_M2}
GLUCOSE BLD-MCNC: 113 MG/DL (ref 70–99)
HCT VFR BLD CALC: 38 % (ref 40.7–50.3)
HEMOGLOBIN: 12.3 G/DL (ref 13–17)
IMMATURE GRANULOCYTES: 0 %
LYMPHOCYTES # BLD: 34 % (ref 24–43)
MCH RBC QN AUTO: 30 PG (ref 25.2–33.5)
MCHC RBC AUTO-ENTMCNC: 32.4 G/DL (ref 28.4–34.8)
MCV RBC AUTO: 92.7 FL (ref 82.6–102.9)
MONOCYTES # BLD: 10 % (ref 3–12)
NRBC AUTOMATED: 0 PER 100 WBC
PDW BLD-RTO: 13.1 % (ref 11.8–14.4)
PHOSPHORUS: 2.7 MG/DL (ref 2.5–4.5)
PLATELET # BLD: 357 K/UL (ref 138–453)
PLATELET ESTIMATE: ABNORMAL
PMV BLD AUTO: 11.5 FL (ref 8.1–13.5)
POTASSIUM SERPL-SCNC: 4.2 MMOL/L (ref 3.7–5.3)
PTH INTACT: 50.82 PG/ML (ref 15–65)
RBC # BLD: 4.1 M/UL (ref 4.21–5.77)
RBC # BLD: ABNORMAL 10*6/UL
SEG NEUTROPHILS: 55 % (ref 36–65)
SEGMENTED NEUTROPHILS ABSOLUTE COUNT: 3.75 K/UL (ref 1.5–8.1)
SODIUM BLD-SCNC: 141 MMOL/L (ref 135–144)
TOTAL PROTEIN, URINE: 14 MG/DL
VITAMIN D 25-HYDROXY: 16.6 NG/ML (ref 30–100)
WBC # BLD: 6.9 K/UL (ref 3.5–11.3)
WBC # BLD: ABNORMAL 10*3/UL

## 2021-09-09 ENCOUNTER — OFFICE VISIT (OUTPATIENT)
Dept: UROLOGY | Age: 61
End: 2021-09-09
Payer: COMMERCIAL

## 2021-09-09 VITALS
BODY MASS INDEX: 36.05 KG/M2 | DIASTOLIC BLOOD PRESSURE: 88 MMHG | WEIGHT: 272 LBS | HEIGHT: 73 IN | HEART RATE: 61 BPM | SYSTOLIC BLOOD PRESSURE: 142 MMHG | TEMPERATURE: 96.8 F

## 2021-09-09 DIAGNOSIS — C61 PROSTATE CANCER (HCC): Primary | ICD-10-CM

## 2021-09-09 DIAGNOSIS — N39.3 STRESS INCONTINENCE OF URINE: ICD-10-CM

## 2021-09-09 DIAGNOSIS — R97.20 ELEVATED PSA: ICD-10-CM

## 2021-09-09 PROCEDURE — 99214 OFFICE O/P EST MOD 30 MIN: CPT | Performed by: UROLOGY

## 2021-09-09 ASSESSMENT — ENCOUNTER SYMPTOMS
WHEEZING: 0
EYE PAIN: 0
VOMITING: 0
COUGH: 0
EYE REDNESS: 0
SHORTNESS OF BREATH: 0
DIARRHEA: 0
NAUSEA: 0
BACK PAIN: 0
CONSTIPATION: 0
ABDOMINAL PAIN: 0

## 2021-09-09 NOTE — PROGRESS NOTES
[address] [address]  Dept: [facility] Urology Office Note - Established    Patient:  Kassandra Mckeon  YOB: 1960  Date: 9/9/2021    The patient is a 59 y.o. male who presents todayfor evaluation of the following problems:   Chief Complaint   Patient presents with    Follow-up     Three month follow-up with PSA       HPI  Here for prostate cancer and elevated psa. psa is lower at 0.02, no dysuria, no hematuria, no fevers, min leaking. No pain, he sees rad onc, they are also watching psa for now    Summary of old records: N/A    Additional History: N/A    Procedures Today: N/A    Urinalysis today:  No results found for this visit on 09/09/21. Last several PSA's:  Lab Results   Component Value Date    PSA 0.02 07/20/2021    PSA 0.02 05/18/2021    PSA 0.01 04/08/2021     Last total testosterone:  No results found for: TESTOSTERONE    AUA Symptom Score (9/9/2021):   INCOMPLETE EMPTYING: How often have you had the sensation of not emptying your bladder?: Not at all  FREQUENCY: How often do you have to urinate less than every two hours?: Not at all  INTERMITTENCY: How often have you found you stopped and started again several times when you urinated?: Not at all  URGENCY: How often have you found it difficult to postpone urination?: Not at all  WEAK STREAM: How often have you had a weak urinary stream?: Not at all  STRAINING: How often have you had to strain to start  urination?: Not at all  NOCTURIA: How many times did you typically get up at night to uriniate?: 1 Time  TOTAL I-PSS SCORE[de-identified] 1       Last BUN and creatinine:  Lab Results   Component Value Date    BUN 13 09/07/2021     Lab Results   Component Value Date    CREATININE 1.46 (H) 09/07/2021       Additional Lab/Culture results: none    Imaging Reviewed during this Office Visit: none  (results were independently reviewed by physician and radiology report verified)    PAST MEDICAL, FAMILY AND SOCIAL HISTORY UPDATE:  Past Medical History:   Diagnosis Date    Back pain     Diabetes mellitus ([address]     Dyslipidemia     Elevated PSA     Erectile dysfunction     History of echocardiogram 04/2014    EF 55%, mild LVH, mild MR, moderate TR    Hyperlipemia     Hypertension     LVH (left ventricular hypertrophy)     Pedal edema     Prostate cancer ([address]     Systolic murmur     Wellness examination     pcp-phone visit dec 2020     Past Surgical History:   Procedure Laterality Date    HERNIA REPAIR      as baby    PROSTATE BIOPSY N/A 11/17/2020    PROSTATE BIOPSY W/ US & TECH TO OR performed by Rachael Parnell MD at [place] [id number]  01/13/2021    robotic/laprascopic/pelvic lymph node dissection    PROSTATECTOMY N/A 1/13/2021    XI ROBOTIC LAPAROSCOPIC PROSTATECTOMY, PELVIC LYMPH NODE DISSECTION performed by Rachael Parnell MD at [address] [facility]  11/17/2020    US PROSTATE NEEDLE PUNCH 11/17/2020 [facility]     Family History   Problem Relation Age of Onset    Diabetes Mother     High Blood Pressure Mother     Cancer Father      Outpatient Medications Marked as Taking for the 9/9/21 encounter (Office Visit) with Rachael Parnell MD   Medication Sig Dispense Refill    CIALIS 5 MG tablet Take 1 tablet by mouth daily 40 tablet 11    SITagliptin (JANUVIA) 50 MG tablet Take 50 mg by mouth daily      atenolol (TENORMIN) 50 MG tablet Take 50 mg by mouth daily      metFORMIN (GLUCOPHAGE) 1000 MG tablet Take 1,000 mg by mouth 2 times daily (with meals)      amLODIPine (NORVASC) 10 MG tablet Take 10 mg by mouth daily      hydroCHLOROthiazide (HYDRODIURIL) 25 MG tablet Take 25 mg by mouth daily      lisinopril (PRINIVIL;ZESTRIL) 40 MG tablet Take 40 mg by mouth daily      pravastatin (PRAVACHOL) 40 MG tablet Take 40 mg by mouth daily         Patient has no known allergies.   Social History     Tobacco Use   Smoking Status Never Smoker   Smokeless Tobacco Never Used     (Ifpatient a smoker, smoking cessation counseling offered)    Social History     Substance and Sexual Activity   Alcohol Use Yes    Comment: socially       REVIEW OF SYSTEMS:  Review of Systems    Physical Exam:      Vitals:    09/09/21 1015   BP: (!) 142/88   Pulse:    Temp:      Body mass index is 35.89 kg/m². Patient is a 59 y.o. male in no acute distress and alert and oriented to person, place and time. Physical Exam  Constitutional: Patient in no acute distress. Neuro: Alert and oriented to person, place and time. Psych: Mood normal, affect normal  Skin: No rash noted  HEENT: Head: Normocephalic andatraumatic  Conjunctivae and EOM are normal. Pupils are equal, round  Nose:Normal  Right External Ear: Normal; Left External Ear: Normal  Mouth: Mucosa Moist  Neck: Supple  Lungs: Respiratory effort is normal  Cardiovascular: Warm & Pink  Abdomen: Soft, non-tender, non-distended with no CVA,  No flank tenderness,  Or     Assessment and Plan      1. Prostate cancer ([address]    2. Elevated PSA    3. Stress incontinence of urine           Plan:     watch psa  Return in about 6 months (around 3/9/2022) for Follow up. Prescriptions Ordered:  No orders of the defined types were placed in this encounter. Orders Placed:  Orders Placed This Encounter   Procedures    PSA, Diagnostic     Standing Status:   Future     Standing Expiration Date:   9/9/2022           Kyara Young MD    Agree with the ROS entered by the MA.

## 2021-09-16 ENCOUNTER — HOSPITAL ENCOUNTER (OUTPATIENT)
Age: 61
Setting detail: SPECIMEN
Discharge: HOME | End: 2021-09-16
Payer: COMMERCIAL

## 2021-09-16 DIAGNOSIS — C61 PROSTATE CANCER (HCC): ICD-10-CM

## 2021-09-16 LAB
BILIRUBIN URINE: NEGATIVE
COLOR: YELLOW
COMMENT UA: ABNORMAL
GLUCOSE URINE: ABNORMAL
KETONES, URINE: NEGATIVE
LEUKOCYTE ESTERASE, URINE: NEGATIVE
NITRITE, URINE: NEGATIVE
PH UA: 7 (ref 5–8)
PROSTATE SPECIFIC ANTIGEN: 0.03 UG/L
PROTEIN UA: NEGATIVE
SPECIFIC GRAVITY UA: 1.02 (ref 1–1.03)
TURBIDITY: CLEAR
URINE HGB: NEGATIVE
UROBILINOGEN, URINE: NORMAL

## 2021-09-23 ENCOUNTER — HOSPITAL ENCOUNTER (OUTPATIENT)
Dept: RADIATION ONCOLOGY | Age: 61
Discharge: HOME | End: 2021-09-23
Payer: COMMERCIAL

## 2021-09-23 VITALS
WEIGHT: 274 LBS | SYSTOLIC BLOOD PRESSURE: 158 MMHG | HEART RATE: 77 BPM | DIASTOLIC BLOOD PRESSURE: 90 MMHG | RESPIRATION RATE: 14 BRPM | OXYGEN SATURATION: 97 % | BODY MASS INDEX: 36.15 KG/M2 | TEMPERATURE: 98 F

## 2021-09-23 DIAGNOSIS — C61 PROSTATE CANCER (HCC): Primary | ICD-10-CM

## 2021-09-23 PROCEDURE — 99213 OFFICE O/P EST LOW 20 MIN: CPT | Performed by: RADIOLOGY

## 2021-09-23 PROCEDURE — 99212 OFFICE O/P EST SF 10 MIN: CPT | Performed by: RADIOLOGY

## 2021-09-23 NOTE — PROGRESS NOTES
Johny Argueta  9/23/2021  10:19 AM      Vitals:    09/23/21 1019   BP: (!) 158/90   Pulse: 77   Resp: 14   Temp: 98 °F (36.7 °C)   SpO2: 97%    :  Patient Currently in Pain: Denies             Wt Readings from Last 1 Encounters:   09/23/21 274 lb (124.3 kg)                Current Outpatient Medications:     Vitamin D (CHOLECALCIFEROL) 25 MCG (1000 UT) TABS tablet, Take 1,000 Units by mouth daily, Disp: , Rfl:     CIALIS 5 MG tablet, Take 1 tablet by mouth daily, Disp: 40 tablet, Rfl: 11    SITagliptin (JANUVIA) 50 MG tablet, Take 50 mg by mouth daily, Disp: , Rfl:     atenolol (TENORMIN) 50 MG tablet, Take 50 mg by mouth daily, Disp: , Rfl:     metFORMIN (GLUCOPHAGE) 1000 MG tablet, Take 1,000 mg by mouth 2 times daily (with meals), Disp: , Rfl:     amLODIPine (NORVASC) 10 MG tablet, Take 10 mg by mouth daily, Disp: , Rfl:     hydroCHLOROthiazide (HYDRODIURIL) 25 MG tablet, Take 25 mg by mouth daily, Disp: , Rfl:     lisinopril (PRINIVIL;ZESTRIL) 40 MG tablet, Take 40 mg by mouth daily, Disp: , Rfl:     pravastatin (PRAVACHOL) 40 MG tablet, Take 40 mg by mouth daily, Disp: , Rfl:          *BREAST Patient only:    Lymphedema Eval:   [] left arm      [] right arm  Location:     Measurement (cm)    Upper Bicep :    Lower Bicep :         FALLS RISK SCREEN  Instructions:  Assess the patient and enter the appropriate indicators that are present for fall risk identification. Total the numbers entered and assign a fall risk score from Table 2.  Reassess patient at a minimum every 12 weeks or with status change. Assessment   Date  9/23/2021     1. Mental Ability: confusion/cognitively impaired 0     2. Elimination Issues: incontinence, frequency 0       3. Ambulatory: use of assistive devices (walker, cane, off-loading devices),        attached to equipment (IV pole, oxygen) 0     4. Sensory Limitations: dizziness, vertigo, impaired vision 0     5. Age less than 65        0     6. Age 72 or greater 0     7. Medication: diuretics, strong analgesics, hypnotics, sedatives,        antihypertensive agents 3   8. Falls:  recent history of falls within the last 3 months (not to include slipping or        tripping) 0   TOTAL 3    If score of 4 or greater was education given? No           TABLE 2   Risk Score Risk Level Plan of Care   0-3 Little or  No Risk 1. Provide assistance as indicated for ambulation activities  2. Reorient confused/cognitively impaired patient  3. Chair/bed in low position, stretcher/bed with siderails up except when performing patient care activities  5. Educate patient/family/caregiver on falls prevention  6.  Reassess in 12 weeks or with any noted change in patient condition which places them at a risk for a fall   4-6 Moderate Risk 1. Provide assistance as indicated for ambulation activities  2. Reorient confused/cognitively impaired patient  3. Chair/bed in low position, stretcher/bed with siderails up except when performing patient care activities  4. Educate patient/family/caregiver on falls prevention     7 or   Higher High Risk 1. Place patient in easily observable treatment room  2. Patient attended at all times by family member or staff  3. Provide assistance as indicated for ambulation activities  4. Reorient confused/cognitively impaired patient  5. Chair/bed in low position, stretcher/bed with siderails up except when performing patient care activities  6. Educate patient/family/caregiver on falls prevention         PLAN: Patient is seen today in follow up to review PSA. Dr Ciro Littlejohn notified and examined pt. Pt to f/u with RO in 3 months with a PSA prior.          Alivia Vines RN

## 2021-09-24 PROBLEM — C61 PROSTATE CANCER (HCC): Chronic | Status: ACTIVE | Noted: 2021-01-13

## 2021-09-24 PROBLEM — E11.9 TYPE 2 DIABETES MELLITUS (HCC): Chronic | Status: ACTIVE | Noted: 2021-09-24

## 2021-09-24 PROBLEM — E11.9 TYPE 2 DIABETES MELLITUS (HCC): Status: ACTIVE | Noted: 2021-09-24

## 2021-09-24 PROBLEM — N18.31 STAGE 3A CHRONIC KIDNEY DISEASE (HCC): Status: ACTIVE | Noted: 2021-09-24

## 2021-09-24 PROBLEM — I10 ESSENTIAL HYPERTENSION: Status: ACTIVE | Noted: 2021-09-24

## 2021-09-24 PROBLEM — I10 ESSENTIAL HYPERTENSION: Chronic | Status: ACTIVE | Noted: 2021-09-24

## 2021-09-24 PROBLEM — N18.31 STAGE 3A CHRONIC KIDNEY DISEASE (HCC): Chronic | Status: ACTIVE | Noted: 2021-09-24

## 2021-09-24 NOTE — PROGRESS NOTES
[address]            Radiation Oncology          [address] [facility], [address]        Dorita Ni: [phone]        F: [phone]       [address] [facility]       Radiation Oncology   [address] [facility] [address] [facility]         [place], [address] [facility]       Dorita Ni: [phone]       F: [phone]       [email or web address]      Date of Service: 2021     Location:  [address] [place] [address],   [address] [facility], [facility]   [phone]        RADIATION ONCOLOGY FOLLOW UP NOTE    Patient ID:   Camila Rueda  : 1960   MRN: [id number]    DIAGNOSIS:  Cancer Staging  Prostate cancer [facility])  Staging form: Prostate, AJCC 8th Edition  - Pathologic stage from 2021: Stage IIIB (pT3a, pN0, cM0, PSA: 15.9, Grade Group: 2) - Signed by Jericho Cruz MD on 6/15/2021    -s/p RP 21 GS 3+4 pT3a with (+) margins    INTERVAL HISTORY:   Camila Rueda is a 59 y.o.. male with history of a favorable intermediate risk breast adenocarcinoma for which he underwent radical prostatectomy in January and was found to have some extraprostatic extension and had multiple foci of positive margins at the time of surgery. Patient was seen for consultation as he had a detectable postoperative PSA of 0.5 on 2021. Patient was referred for pelvic floor rehabilitation as he was having issues with urinary continence postoperatively. Patient comes in today for follow-up visit and notes that he has had significant provement in his urinary control. He does still have occasional leakage however attributes it mostly to not being aware of it at night when he sleeping. Patient otherwise denies any other acute symptoms at this time headaches, dizziness, chest pain, shortness of breath, abdominal pain, nausea, weight loss, fatigue, bony pain, swelling, or bleeding. He denies any other urinary or bowel symptoms.   He did have a repeat PSA drawn on 2021 which still is low at 0.03.      AUA Score: 0    MEDICATIONS:    Current Outpatient Medications:     Vitamin D (CHOLECALCIFEROL) 25 MCG (1000 UT) TABS tablet, Take 1,000 Units by mouth daily, Disp: , Rfl:     CIALIS 5 MG tablet, Take 1 tablet by mouth daily, Disp: 40 tablet, Rfl: 11    SITagliptin (JANUVIA) 50 MG tablet, Take 50 mg by mouth daily, Disp: , Rfl:     atenolol (TENORMIN) 50 MG tablet, Take 50 mg by mouth daily, Disp: , Rfl:     metFORMIN (GLUCOPHAGE) 1000 MG tablet, Take 1,000 mg by mouth 2 times daily (with meals), Disp: , Rfl:     amLODIPine (NORVASC) 10 MG tablet, Take 10 mg by mouth daily (Patient not taking: Reported on 2021), Disp: , Rfl:     hydroCHLOROthiazide (HYDRODIURIL) 25 MG tablet, Take 25 mg by mouth daily, Disp: , Rfl:     lisinopril (PRINIVIL;ZESTRIL) 40 MG tablet, Take 40 mg by mouth daily, Disp: , Rfl:     pravastatin (PRAVACHOL) 40 MG tablet, Take 40 mg by mouth daily, Disp: , Rfl:     ALLERGIES:  No Known Allergies      REVIEW OF SYSTEMS:    A full 14 point review of systems was performed and assessed and found to be negative except as noted above. PHYSICAL EXAMINATION:    CHAPERONE: Not Required    ECO Asymptomatic    VITAL SIGNS: BP (!) 158/90   Pulse 77   Temp 98 °F (36.7 °C)   Resp 14   Wt 274 lb (124.3 kg)   SpO2 97%   BMI 36.15 kg/m²   GENERAL:  General appearance is that of a well-nourished, well-developed in no apparent distress. HEENT: Normocephalic, atraumatic, EOMI, moist mucosa, no erythema. NECK:  No adenopathy or a palpable thyroid mass, trachea is midline. LYMPHATICS: No cervical, supraclavicular adenopathy. HEART:  Normal rate and regular rhythm, normal heart sounds. LUNGS:  Pulmonary effort normal. Normal breath sounds. ABDOMEN:  Soft, nontender, non distended. EXTREMITIES:  No edema. No calf tenderness. MSK:  No spinal tenderness. Normal ROM. NEUROLOGICAL: Alert and oriented.  Strength and sensation intact bilaterally. No focal deficits. PSYCH: Mood normal, behavior normal.  SKIN: No erythema, no desquamation. RECTAL: Deferred     LABS:  WBC   Date Value Ref Range Status   2021 6.9 3.5 - 11.3 k/uL Final     Segs Absolute   Date Value Ref Range Status   2021 3.75 1.50 - 8.10 k/uL Final     Hemoglobin   Date Value Ref Range Status   2021 12.3 (L) 13.0 - 17.0 g/dL Final     Platelets   Date Value Ref Range Status   2021 357 138 - 453 k/uL Final     No results found for: , CEA  PSA   Date Value Ref Range Status   2021 0.03 <4.1 ug/L Final     Comment:     The Roche \"ECLIA\" assay is used. Results obtained with different assay methods cannot be   used interchangeably. 2021 0.02 <4.1 ug/L Final     Comment:     The Roche \"ECLIA\" assay is used. Results obtained with different assay methods cannot be   used interchangeably. 2021 0.02 <4.1 ug/L Final     Comment:     The Roche \"ECLIA\" assay is used. Results obtained with different assay methods cannot be   used interchangeably. 2021 0.01 <4.1 ug/L Final     Comment:     The Roche \"ECLIA\" assay is used. Results obtained with different assay methods cannot be   used interchangeably. 2021 0.05 <4.1 ug/L Final     Comment:     The Roche \"ECLIA\" assay is used. Results obtained with different assay methods cannot be   used interchangeably. IMAGIN/9/20 Bone Scan     Impression   Midline frontal skull activity is typically on the basis of hyperostosis or   sinus disease, less likely metastatic disease given location and lack of   widespread findings elsewhere.  Correlate with PSA and continued attention on   follow-up.       Multifocal degenerative changes are otherwise favored as outlined above.      20 CT Abd/Pel  Impression   1.  No evidence for metastatic disease in the abdomen or pelvis.       2.  Mild fat induration surrounding the bladder, question underlying is more than 30 minutes. This time includes combination of one or more of the following - review of necessary tests, review of pertinent medical records from the EMR, performing medically appropriate examination and evaluation, counseling and educating the patient/family/caregiver, ordering necessary medical tests, procedures etc., documenting the clinical information in the electronic medical record, care coordination, referring and communicating with other health care providers and interpretation of results independently. This note is created with the assistance of a speech recognition program.  While intending to generate a document that actually reflects the content of the visit, the document can still have some errors including those of syntax and sound a like substitutions which may escape proof reading. It such instances, actual meaning can be extrapolated by contextual diversion.

## 2021-10-15 ENCOUNTER — HOSPITAL ENCOUNTER (OUTPATIENT)
Age: 61
Setting detail: SPECIMEN
Discharge: HOME | End: 2021-10-15
Payer: COMMERCIAL

## 2021-10-15 LAB
ANION GAP SERPL CALCULATED.3IONS-SCNC: 14 MMOL/L (ref 9–17)
BUN BLDV-MCNC: 26 MG/DL (ref 8–23)
BUN/CREAT BLD: ABNORMAL (ref 9–20)
CALCIUM SERPL-MCNC: 9 MG/DL (ref 8.6–10.4)
CHLORIDE BLD-SCNC: 100 MMOL/L (ref 98–107)
CO2: 23 MMOL/L (ref 20–31)
CREAT SERPL-MCNC: 1.77 MG/DL (ref 0.7–1.2)
GFR AFRICAN AMERICAN: 47 ML/MIN
GFR NON-AFRICAN AMERICAN: 39 ML/MIN
GFR SERPL CREATININE-BSD FRML MDRD: ABNORMAL ML/MIN/{1.73_M2}
GFR SERPL CREATININE-BSD FRML MDRD: ABNORMAL ML/MIN/{1.73_M2}
GLUCOSE BLD-MCNC: 110 MG/DL (ref 70–99)
POTASSIUM SERPL-SCNC: 3.9 MMOL/L (ref 3.7–5.3)
SODIUM BLD-SCNC: 137 MMOL/L (ref 135–144)

## 2021-10-27 ENCOUNTER — HOSPITAL ENCOUNTER (OUTPATIENT)
Age: 61
Setting detail: SPECIMEN
Discharge: HOME | End: 2021-10-27
Payer: COMMERCIAL

## 2021-10-27 DIAGNOSIS — N18.30 STAGE 3 CHRONIC KIDNEY DISEASE, UNSPECIFIED WHETHER STAGE 3A OR 3B CKD (HCC): ICD-10-CM

## 2021-10-27 LAB
ANION GAP SERPL CALCULATED.3IONS-SCNC: 14 MMOL/L (ref 9–17)
BUN BLDV-MCNC: 22 MG/DL (ref 8–23)
BUN/CREAT BLD: ABNORMAL (ref 9–20)
CALCIUM SERPL-MCNC: 9.3 MG/DL (ref 8.6–10.4)
CHLORIDE BLD-SCNC: 101 MMOL/L (ref 98–107)
CO2: 23 MMOL/L (ref 20–31)
CREAT SERPL-MCNC: 1.63 MG/DL (ref 0.7–1.2)
GFR AFRICAN AMERICAN: 52 ML/MIN
GFR NON-AFRICAN AMERICAN: 43 ML/MIN
GFR SERPL CREATININE-BSD FRML MDRD: ABNORMAL ML/MIN/{1.73_M2}
GFR SERPL CREATININE-BSD FRML MDRD: ABNORMAL ML/MIN/{1.73_M2}
GLUCOSE BLD-MCNC: 101 MG/DL (ref 70–99)
POTASSIUM SERPL-SCNC: 3.9 MMOL/L (ref 3.7–5.3)
SODIUM BLD-SCNC: 138 MMOL/L (ref 135–144)

## 2021-12-29 ENCOUNTER — HOSPITAL ENCOUNTER (OUTPATIENT)
Age: 61
Setting detail: SPECIMEN
Discharge: HOME | End: 2021-12-29

## 2021-12-29 LAB
ALBUMIN SERPL-MCNC: 4.5 G/DL (ref 3.5–5.2)
ALBUMIN/GLOBULIN RATIO: 1.5 (ref 1–2.5)
ALP BLD-CCNC: 81 U/L (ref 40–129)
ALT SERPL-CCNC: 19 U/L (ref 5–41)
AST SERPL-CCNC: 18 U/L
BILIRUB SERPL-MCNC: 0.48 MG/DL (ref 0.3–1.2)
BILIRUBIN DIRECT: 0.09 MG/DL
BILIRUBIN, INDIRECT: 0.39 MG/DL (ref 0–1)
CHOLESTEROL, FASTING: 223 MG/DL
CHOLESTEROL/HDL RATIO: 5.9
CREATININE URINE: 262.3 MG/DL (ref 39–259)
GLOBULIN: NORMAL G/DL (ref 1.5–3.8)
HDLC SERPL-MCNC: 38 MG/DL
LDL CHOLESTEROL: 136 MG/DL (ref 0–130)
MICROALBUMIN/CREAT 24H UR: 67 MG/L
MICROALBUMIN/CREAT UR-RTO: 26 MCG/MG CREAT
TOTAL PROTEIN: 7.5 G/DL (ref 6.4–8.3)
TRIGLYCERIDE, FASTING: 247 MG/DL
VLDLC SERPL CALC-MCNC: ABNORMAL MG/DL (ref 1–30)

## 2022-01-04 ENCOUNTER — HOSPITAL ENCOUNTER (OUTPATIENT)
Age: 62
Setting detail: SPECIMEN
Discharge: HOME | End: 2022-01-04

## 2022-01-04 DIAGNOSIS — C61 PROSTATE CANCER (HCC): ICD-10-CM

## 2022-01-05 LAB — PROSTATE SPECIFIC ANTIGEN: 0.05 UG/L

## 2022-01-06 ENCOUNTER — HOSPITAL ENCOUNTER (OUTPATIENT)
Dept: RADIATION ONCOLOGY | Age: 62
Discharge: HOME | End: 2022-01-06
Payer: COMMERCIAL

## 2022-01-06 VITALS
SYSTOLIC BLOOD PRESSURE: 127 MMHG | DIASTOLIC BLOOD PRESSURE: 80 MMHG | HEART RATE: 86 BPM | OXYGEN SATURATION: 99 % | RESPIRATION RATE: 14 BRPM | WEIGHT: 270 LBS | TEMPERATURE: 97.8 F | BODY MASS INDEX: 35.62 KG/M2

## 2022-01-06 DIAGNOSIS — C61 PROSTATE CANCER (HCC): Primary | Chronic | ICD-10-CM

## 2022-01-06 PROCEDURE — 99213 OFFICE O/P EST LOW 20 MIN: CPT | Performed by: RADIOLOGY

## 2022-01-06 PROCEDURE — 99212 OFFICE O/P EST SF 10 MIN: CPT | Performed by: RADIOLOGY

## 2022-01-06 NOTE — PROGRESS NOTES
Darshanachidi Carvajal  1/6/2022  10:41 AM      Vitals:    01/06/22 1030   BP: 127/80   Pulse: 86   Resp: 14   Temp: 97.8 °F (36.6 °C)   SpO2: 99%    :  Patient Currently in Pain: Denies             Wt Readings from Last 1 Encounters:   01/06/22 270 lb (122.5 kg)                Current Outpatient Medications:     Vitamin D (CHOLECALCIFEROL) 25 MCG (1000 UT) TABS tablet, Take 1,000 Units by mouth daily, Disp: , Rfl:     CIALIS 5 MG tablet, Take 1 tablet by mouth daily, Disp: 40 tablet, Rfl: 11    SITagliptin (JANUVIA) 50 MG tablet, Take 50 mg by mouth daily, Disp: , Rfl:     atenolol (TENORMIN) 50 MG tablet, Take 50 mg by mouth daily, Disp: , Rfl:     metFORMIN (GLUCOPHAGE) 1000 MG tablet, Take 500 mg by mouth daily (with breakfast) , Disp: , Rfl:     amLODIPine (NORVASC) 10 MG tablet, Take 10 mg by mouth daily (Patient not taking: Reported on 9/24/2021), Disp: , Rfl:     hydroCHLOROthiazide (HYDRODIURIL) 25 MG tablet, Take 25 mg by mouth daily, Disp: , Rfl:     lisinopril (PRINIVIL;ZESTRIL) 40 MG tablet, Take 40 mg by mouth daily, Disp: , Rfl:     pravastatin (PRAVACHOL) 40 MG tablet, Take 40 mg by mouth daily, Disp: , Rfl:                FALLS RISK SCREEN  Instructions:  Assess the patient and enter the appropriate indicators that are present for fall risk identification. Total the numbers entered and assign a fall risk score from Table 2.  Reassess patient at a minimum every 12 weeks or with status change. Assessment   Date  1/6/2022     1. Mental Ability: confusion/cognitively impaired 0     2. Elimination Issues: incontinence, frequency 0       3. Ambulatory: use of assistive devices (walker, cane, off-loading devices),        attached to equipment (IV pole, oxygen) 0     4. Sensory Limitations: dizziness, vertigo, impaired vision 0     5. Age less than 65        0     6. Age 72 or greater 0     7. Medication: diuretics, strong analgesics, hypnotics, sedatives,        antihypertensive agents 3   8.   Falls:

## 2022-01-10 NOTE — PROGRESS NOTES
[address]            Radiation Oncology          [address] [facility]          [place], [address]        [place]: [phone]        F: [phone]       [place] [address] [facility]       Radiation Oncology   [facility] [address] [place] [address]         [place], [address] [facility]       [place]: [phone]       F: [phone]       [email or web address]      Date of Service: 2022     Location:  [address] [place] [address] [place],   [address] [facility] [place]   [phone]        RADIATION ONCOLOGY FOLLOW UP NOTE    Patient ID:   Marcie Suazo  : 1960   MRN: [id number]    DIAGNOSIS:  Cancer Staging  Prostate cancer [facility])  Staging form: Prostate, AJCC 8th Edition  - Pathologic stage from 2021: Stage IIIB (pT3a, pN0, cM0, PSA: 15.9, Grade Group: 2) - Signed by Alanis Mckee MD on 6/15/2021    -s/p RP 21 GS 3+4 pT3a with (+) margins    INTERVAL HISTORY:   Marcie Suazo is a 59 y.o.. male with history of a favorable intermediate risk breast adenocarcinoma for which he underwent radical prostatectomy in January and was found to have some extraprostatic extension and had multiple foci of positive margins at the time of surgery. Patient was seen for consultation as he had a detectable postoperative PSA of 0.5 on 2021. Patient was referred for pelvic floor rehabilitation as he was having issues with urinary continence postoperatively. Patient comes in today for follow-up visit and notes that he has had significant improvement in his urinary control. He does still have occasional leakage however attributes it mostly to certain movements. Patient still has erectile dysfunction though is noting some sensation returning. Patient otherwise denies any other acute symptoms at this time headaches, dizziness, chest pain, shortness of breath, abdominal pain, nausea, weight loss, fatigue, bony pain, swelling, or bleeding.   He denies any other urinary or bowel symptoms. He did have a repeat PSA drawn on 2022 which came back slightly higher at 0.05.      AUA Score: 3    MEDICATIONS:    Current Outpatient Medications:     Vitamin D (CHOLECALCIFEROL) 25 MCG (1000 UT) TABS tablet, Take 1,000 Units by mouth daily, Disp: , Rfl:     CIALIS 5 MG tablet, Take 1 tablet by mouth daily, Disp: 40 tablet, Rfl: 11    SITagliptin (JANUVIA) 50 MG tablet, Take 50 mg by mouth daily, Disp: , Rfl:     atenolol (TENORMIN) 50 MG tablet, Take 50 mg by mouth daily, Disp: , Rfl:     metFORMIN (GLUCOPHAGE) 1000 MG tablet, Take 500 mg by mouth daily (with breakfast) , Disp: , Rfl:     amLODIPine (NORVASC) 10 MG tablet, Take 10 mg by mouth daily (Patient not taking: Reported on 2021), Disp: , Rfl:     hydroCHLOROthiazide (HYDRODIURIL) 25 MG tablet, Take 25 mg by mouth daily, Disp: , Rfl:     lisinopril (PRINIVIL;ZESTRIL) 40 MG tablet, Take 40 mg by mouth daily, Disp: , Rfl:     pravastatin (PRAVACHOL) 40 MG tablet, Take 40 mg by mouth daily, Disp: , Rfl:     ALLERGIES:  No Known Allergies      REVIEW OF SYSTEMS:    A full 14 point review of systems was performed and assessed and found to be negative except as noted above. PHYSICAL EXAMINATION:    CHAPERONE: Not Required    ECO Asymptomatic    VITAL SIGNS: /80   Pulse 86   Temp 97.8 °F (36.6 °C)   Resp 14   Wt 270 lb (122.5 kg)   SpO2 99%   BMI 35.62 kg/m²   GENERAL:  General appearance is that of a well-nourished, well-developed in no apparent distress. HEENT: Normocephalic, atraumatic, EOMI, moist mucosa, no erythema. NECK:  No adenopathy or a palpable thyroid mass, trachea is midline. LYMPHATICS: No cervical, supraclavicular adenopathy. HEART:  Normal rate and regular rhythm, normal heart sounds. LUNGS:  Pulmonary effort normal. Normal breath sounds. ABDOMEN:  Soft, nontender, non distended. EXTREMITIES:  No edema. No calf tenderness. MSK:  No spinal tenderness. Normal ROM. NEUROLOGICAL: Alert and oriented. Strength and sensation intact bilaterally. No focal deficits. PSYCH: Mood normal, behavior normal.  SKIN: No erythema, no desquamation. RECTAL: Deferred     LABS:  WBC   Date Value Ref Range Status   2021 6.9 3.5 - 11.3 k/uL Final     Segs Absolute   Date Value Ref Range Status   2021 3.75 1.50 - 8.10 k/uL Final     Hemoglobin   Date Value Ref Range Status   2021 12.3 (L) 13.0 - 17.0 g/dL Final     Platelets   Date Value Ref Range Status   2021 357 138 - 453 k/uL Final     No results found for: , CEA  PSA   Date Value Ref Range Status   2022 0.05 <4.1 ug/L Final     Comment:     The Roche \"ECLIA\" assay is used. Results obtained with different assay methods cannot be   used interchangeably. 2021 0.03 <4.1 ug/L Final     Comment:     The Roche \"ECLIA\" assay is used. Results obtained with different assay methods cannot be   used interchangeably. 2021 0.02 <4.1 ug/L Final     Comment:     The Roche \"ECLIA\" assay is used. Results obtained with different assay methods cannot be   used interchangeably. 2021 0.02 <4.1 ug/L Final     Comment:     The Roche \"ECLIA\" assay is used. Results obtained with different assay methods cannot be   used interchangeably. 2021 0.01 <4.1 ug/L Final     Comment:     The Roche \"ECLIA\" assay is used. Results obtained with different assay methods cannot be   used interchangeably. IMAGIN/9/20 Bone Scan     Impression   Midline frontal skull activity is typically on the basis of hyperostosis or   sinus disease, less likely metastatic disease given location and lack of   widespread findings elsewhere.  Correlate with PSA and continued attention on   follow-up.       Multifocal degenerative changes are otherwise favored as outlined above.      20 CT Abd/Pel  Impression   1.  No evidence for metastatic disease in the abdomen or pelvis.       2. Demi Cascades fat induration surrounding the bladder, question underlying cystitis. ASSESSMENT AND PLAN:  Jacob Knight is a 59 y.o. male with a Cancer Staging  Prostate cancer ([facility] [address]  Staging form: Prostate, AJCC 8th Edition  - Pathologic stage from 1/13/2021: Stage IIIB (pT3a, pN0, cM0, PSA: 15.9, Grade Group: 2) - Signed by Alexandro Hinton MD on 6/15/2021  . Patient comes in today for a follow-up visit approximately 1 year after his radical prostatectomy for his above-stated prostate cancer. Patient has been doing well with recovering his urinary continence. He does note that he is still continuing to have some improvement in his urinary control. We did reviewed that his most recent PSA still is fairly low still at 0.05 and below the threshold for which consider biochemical failure which would be 0.1 to 0.2. We did review the indications for adjuvant radiation therapy given his positive margin and extracapsular extension, as well as the role for early salvage radiation therapy for patients who have rising PSAs. However to avoid treatment toxicities and to allow patient further healing with his erectile function and bladder control we will continue monitoring his PSA until it has reached this threshold. Patient will be recommended to follow-up with urology as well to discuss his erectile dysfunction treatment options. Given that patient is still noting some improvements, we recommend patient have another follow-up with repeat PSA in 6 months and a follow-up visit afterwards to review the results. Patient will again be reevaluated for consideration of early salvage radiation therapy. Patient was also notified to contact us should he have any changes or concerns in his urinary symptoms. Patient was in agreement with my recommendations. All questions were answered to their satisfaction. Patient was advised to contact us anytime should they have any questions or concerns.          Electronically signed by Mary Nelson Kay Guzmán MD on 1/10/2022 at 9:05 AM        Medications Prescribed:   New Prescriptions    No medications on file       Orders:   Orders Placed This Encounter   Procedures    PSA, Diagnostic       CC:  Patient Care Team:  Milena Wagner MD as PCP - Lion Briseno MD as Consulting Physician (Nephrology)     Total time spent on this case on the day of encounter is more than 30 minutes. This time includes combination of one or more of the following - review of necessary tests, review of pertinent medical records from the EMR, performing medically appropriate examination and evaluation, counseling and educating the patient/family/caregiver, ordering necessary medical tests, procedures etc., documenting the clinical information in the electronic medical record, care coordination, referring and communicating with other health care providers and interpretation of results independently. This note is created with the assistance of a speech recognition program.  While intending to generate a document that actually reflects the content of the visit, the document can still have some errors including those of syntax and sound a like substitutions which may escape proof reading. It such instances, actual meaning can be extrapolated by contextual diversion.

## 2022-03-07 ENCOUNTER — HOSPITAL ENCOUNTER (OUTPATIENT)
Age: 62
Setting detail: SPECIMEN
Discharge: HOME | End: 2022-03-07

## 2022-03-07 DIAGNOSIS — N17.9 AKI (ACUTE KIDNEY INJURY) (HCC): ICD-10-CM

## 2022-03-07 DIAGNOSIS — C61 PROSTATE CANCER (HCC): Chronic | ICD-10-CM

## 2022-03-07 LAB
ANION GAP SERPL CALCULATED.3IONS-SCNC: 12 MMOL/L (ref 9–17)
BUN BLDV-MCNC: 16 MG/DL (ref 8–23)
CALCIUM IONIZED: 1.34 MMOL/L (ref 1.13–1.33)
CALCIUM SERPL-MCNC: 9.6 MG/DL (ref 8.6–10.4)
CHLORIDE BLD-SCNC: 99 MMOL/L (ref 98–107)
CO2: 26 MMOL/L (ref 20–31)
CREAT SERPL-MCNC: 1.3 MG/DL (ref 0.7–1.2)
CREATININE URINE: 193.6 MG/DL (ref 39–259)
GFR AFRICAN AMERICAN: >60 ML/MIN
GFR NON-AFRICAN AMERICAN: 56 ML/MIN
GFR SERPL CREATININE-BSD FRML MDRD: ABNORMAL ML/MIN/{1.73_M2}
GLUCOSE BLD-MCNC: 116 MG/DL (ref 70–99)
PHOSPHORUS: 2.8 MG/DL (ref 2.5–4.5)
POTASSIUM SERPL-SCNC: 4.3 MMOL/L (ref 3.7–5.3)
PROSTATE SPECIFIC ANTIGEN: 0.05 UG/L
PTH INTACT: 32.92 PG/ML (ref 15–65)
SODIUM BLD-SCNC: 137 MMOL/L (ref 135–144)
TOTAL PROTEIN, URINE: 18 MG/DL
VITAMIN D 25-HYDROXY: 24.5 NG/ML

## 2022-03-10 ENCOUNTER — OFFICE VISIT (OUTPATIENT)
Dept: UROLOGY | Age: 62
End: 2022-03-10
Payer: COMMERCIAL

## 2022-03-10 VITALS
HEART RATE: 66 BPM | BODY MASS INDEX: 35.78 KG/M2 | TEMPERATURE: 97 F | WEIGHT: 270 LBS | HEIGHT: 73 IN | SYSTOLIC BLOOD PRESSURE: 135 MMHG | DIASTOLIC BLOOD PRESSURE: 80 MMHG

## 2022-03-10 DIAGNOSIS — N52.01 ERECTILE DYSFUNCTION DUE TO ARTERIAL INSUFFICIENCY: ICD-10-CM

## 2022-03-10 DIAGNOSIS — C61 PROSTATE CANCER (HCC): Primary | ICD-10-CM

## 2022-03-10 DIAGNOSIS — R97.20 ELEVATED PSA: ICD-10-CM

## 2022-03-10 PROCEDURE — G8427 DOCREV CUR MEDS BY ELIG CLIN: HCPCS | Performed by: UROLOGY

## 2022-03-10 PROCEDURE — G8417 CALC BMI ABV UP PARAM F/U: HCPCS | Performed by: UROLOGY

## 2022-03-10 PROCEDURE — 3017F COLORECTAL CA SCREEN DOC REV: CPT | Performed by: UROLOGY

## 2022-03-10 PROCEDURE — 1036F TOBACCO NON-USER: CPT | Performed by: UROLOGY

## 2022-03-10 PROCEDURE — G8484 FLU IMMUNIZE NO ADMIN: HCPCS | Performed by: UROLOGY

## 2022-03-10 PROCEDURE — 99214 OFFICE O/P EST MOD 30 MIN: CPT | Performed by: UROLOGY

## 2022-03-10 RX ORDER — SILDENAFIL 100 MG/1
100 TABLET, FILM COATED ORAL DAILY PRN
Qty: 15 TABLET | Refills: 3 | Status: SHIPPED | OUTPATIENT
Start: 2022-03-10

## 2022-03-10 ASSESSMENT — ENCOUNTER SYMPTOMS
GASTROINTESTINAL NEGATIVE: 1
COUGH: 0
DIARRHEA: 0
ABDOMINAL PAIN: 0
EYES NEGATIVE: 1
EYE REDNESS: 0
SHORTNESS OF BREATH: 0
NAUSEA: 0
EYE PAIN: 0
RESPIRATORY NEGATIVE: 1
CONSTIPATION: 0
VOMITING: 0
BACK PAIN: 0
WHEEZING: 0

## 2022-03-10 NOTE — PROGRESS NOTES
[address] [facility] [id number]  Dept: [address] [facility] Urology Office Note - Established    Patient:  Grecia Nice  YOB: 1960  Date: 3/10/2022    The patient is a 59 y.o. male who presents todayfor evaluation of the following problems:   Chief Complaint   Patient presents with    6 Month Follow-Up       HPI  Here for prostate cancer and elevated psa. Had rrp in jan 2021. Doing ok, min incontinence, psa slowly increasing but stable now. 0.05. has ED    Summary of old records: N/A    Additional History: N/A    Procedures Today: N/A    Urinalysis today:  No results found for this visit on 03/10/22. Last several PSA's:  Lab Results   Component Value Date    PSA 0.05 03/07/2022    PSA 0.05 01/04/2022    PSA 0.03 09/16/2021     Last total testosterone:  No results found for: TESTOSTERONE    AUA Symptom Score (3/10/2022):   INCOMPLETE EMPTYING: How often have you had the sensation of not emptying your bladder?: Not at all  FREQUENCY: How often do you have to urinate less than every two hours?: Not at all  INTERMITTENCY: How often have you found you stopped and started again several times when you urinated?: Not at all  URGENCY: How often have you found it difficult to postpone urination?: Not at all  WEAK STREAM: How often have you had a weak urinary stream?: Not at all  STRAINING: How often have you had to strain to start  urination?: Not at all  NOCTURIA: How many times did you typically get up at night to uriniate?: 1 Time  TOTAL I-PSS SCORE[de-identified] 1       Last BUN and creatinine:  Lab Results   Component Value Date    BUN 16 03/07/2022     Lab Results   Component Value Date    CREATININE 1.30 (H) 03/07/2022       Additional Lab/Culture results: none    Imaging Reviewed during this Office Visit: none  (results were independently reviewed by physician and radiology report verified)    PAST MEDICAL, FAMILY AND SOCIAL HISTORY UPDATE:  Past Medical History:   Diagnosis Date    Back pain     Diabetes mellitus ([facility] [address]     Dyslipidemia     Elevated PSA     Erectile dysfunction     History of echocardiogram 04/2014    EF 55%, mild LVH, mild MR, moderate TR    Hyperlipemia     Hypertension     LVH (left ventricular hypertrophy)     Pedal edema     Prostate cancer ([facility] [address]     Systolic murmur     Wellness examination     pcp-phone visit dec 2020     Past Surgical History:   Procedure Laterality Date    HERNIA REPAIR      as baby    PROSTATE BIOPSY N/A 11/17/2020    PROSTATE BIOPSY W/ US & TECH TO OR performed by Liz Valdes MD at [address] [place] [address]  01/13/2021    robotic/laprascopic/pelvic lymph node dissection    PROSTATECTOMY N/A 1/13/2021    XI ROBOTIC LAPAROSCOPIC PROSTATECTOMY, PELVIC LYMPH NODE DISSECTION performed by Liz Valdes MD at [facility]  11/17/2020    US PROSTATE NEEDLE PUNCH 11/17/2020 [facility] ULTRASOUND     Family History   Problem Relation Age of Onset    Diabetes Mother     High Blood Pressure Mother     Cancer Father      Outpatient Medications Marked as Taking for the 3/10/22 encounter (Office Visit) with Liz Valdes MD   Medication Sig Dispense Refill    sildenafil (VIAGRA) 100 MG tablet Take 1 tablet by mouth daily as needed for Erectile Dysfunction 15 tablet 3    Vitamin D (CHOLECALCIFEROL) 25 MCG (1000 UT) TABS tablet Take 1,000 Units by mouth daily      CIALIS 5 MG tablet Take 1 tablet by mouth daily 40 tablet 11    SITagliptin (JANUVIA) 50 MG tablet Take 50 mg by mouth daily      atenolol (TENORMIN) 50 MG tablet Take 50 mg by mouth daily      metFORMIN (GLUCOPHAGE) 1000 MG tablet Take 500 mg by mouth daily (with breakfast)       hydroCHLOROthiazide (HYDRODIURIL) 25 MG tablet Take 25 mg by mouth daily      lisinopril (PRINIVIL;ZESTRIL) 40 MG tablet Take 40 mg by mouth daily      pravastatin (PRAVACHOL) 40 MG tablet Take 40 mg by mouth daily         Patient has no known allergies. Social History     Tobacco Use   Smoking Status Never Smoker   Smokeless Tobacco Never Used     (Ifpatient a smoker, smoking cessation counseling offered)    Social History     Substance and Sexual Activity   Alcohol Use Yes    Comment: socially       REVIEW OF SYSTEMS:  Review of Systems    Physical Exam:      Vitals:    03/10/22 1157   BP: 135/80   Pulse: 66   Temp: 97 °F (36.1 °C)     Body mass index is 35.62 kg/m². Patient is a 59 y.o. male in no acute distress and alert and oriented to person, place and time. Physical Exam  Constitutional: Patient in no acute distress. Neuro: Alert and oriented to person, place and time. Psych: Mood normal, affect normal  Skin: No rash noted  HEENT: Head: Normocephalic andatraumatic  Conjunctivae and EOM are normal. Pupils are equal, round  Nose:Normal  Right External Ear: Normal; Left External Ear: Normal  Mouth: Mucosa Moist  Neck: Supple  Lungs: Respiratory effort is normal  Cardiovascular: Warm & Pink  Abdomen: Soft, non-tender, non-distended with no CVA,  No flank tenderness,  Or hepatosplenomegaly   Lymphatics: No palpablelymphadenopathy. Assessment and Plan      1. Prostate cancer ([address]    2. Elevated PSA    3. Erectile dysfunction due to arterial insufficiency           Plan:       Return in about 6 months (around 9/10/2022) for Follow up. Prescriptions Ordered:  Orders Placed This Encounter   Medications    sildenafil (VIAGRA) 100 MG tablet     Sig: Take 1 tablet by mouth daily as needed for Erectile Dysfunction     Dispense:  15 tablet     Refill:  3     Orders Placed:  Orders Placed This Encounter   Procedures    PSA, Diagnostic     Standing Status:   Future     Standing Expiration Date:   3/10/2023           Roxanne Navarrete MD    Agree with the ROS entered by the MA.

## 2022-07-07 ENCOUNTER — HOSPITAL ENCOUNTER (OUTPATIENT)
Age: 62
Discharge: HOME | End: 2022-07-07
Payer: COMMERCIAL

## 2022-07-07 ENCOUNTER — HOSPITAL ENCOUNTER (OUTPATIENT)
Dept: RADIATION ONCOLOGY | Age: 62
Discharge: HOME | End: 2022-07-07
Payer: MEDICARE

## 2022-07-07 VITALS
DIASTOLIC BLOOD PRESSURE: 90 MMHG | TEMPERATURE: 97.5 F | SYSTOLIC BLOOD PRESSURE: 151 MMHG | HEART RATE: 63 BPM | BODY MASS INDEX: 37.39 KG/M2 | RESPIRATION RATE: 14 BRPM | WEIGHT: 283.4 LBS

## 2022-07-07 DIAGNOSIS — C61 PROSTATE CANCER (HCC): Primary | Chronic | ICD-10-CM

## 2022-07-07 DIAGNOSIS — C61 PROSTATE CANCER (HCC): ICD-10-CM

## 2022-07-07 LAB — PROSTATE SPECIFIC ANTIGEN: 0.06 NG/ML

## 2022-07-07 PROCEDURE — 84153 ASSAY OF PSA TOTAL: CPT

## 2022-07-07 PROCEDURE — 99212 OFFICE O/P EST SF 10 MIN: CPT | Performed by: RADIOLOGY

## 2022-07-07 PROCEDURE — 36415 COLL VENOUS BLD VENIPUNCTURE: CPT

## 2022-07-07 PROCEDURE — 99214 OFFICE O/P EST MOD 30 MIN: CPT | Performed by: RADIOLOGY

## 2022-07-07 NOTE — PROGRESS NOTES
Jason Quevedo  7/7/2022  10:47 AM      Vitals:    07/07/22 1045   Resp: 14   Temp: 97.5 °F (36.4 °C)    :                 Wt Readings from Last 1 Encounters:   07/07/22 283 lb 6.4 oz (128.5 kg)                Current Outpatient Medications:     sildenafil (VIAGRA) 100 MG tablet, Take 1 tablet by mouth daily as needed for Erectile Dysfunction, Disp: 15 tablet, Rfl: 3    Vitamin D (CHOLECALCIFEROL) 25 MCG (1000 UT) TABS tablet, Take 1,000 Units by mouth daily, Disp: , Rfl:     CIALIS 5 MG tablet, Take 1 tablet by mouth daily (Patient not taking: Reported on 3/25/2022), Disp: 40 tablet, Rfl: 11    SITagliptin (JANUVIA) 50 MG tablet, Take 50 mg by mouth daily, Disp: , Rfl:     atenolol (TENORMIN) 50 MG tablet, Take 50 mg by mouth daily, Disp: , Rfl:     metFORMIN (GLUCOPHAGE) 1000 MG tablet, Take 500 mg by mouth daily (with breakfast) , Disp: , Rfl:     amLODIPine (NORVASC) 10 MG tablet, Take 10 mg by mouth daily (Patient not taking: Reported on 9/24/2021), Disp: , Rfl:     hydroCHLOROthiazide (HYDRODIURIL) 25 MG tablet, Take 25 mg by mouth daily, Disp: , Rfl:     lisinopril (PRINIVIL;ZESTRIL) 40 MG tablet, Take 40 mg by mouth daily, Disp: , Rfl:     pravastatin (PRAVACHOL) 40 MG tablet, Take 40 mg by mouth daily, Disp: , Rfl:     Immunizations:    Influenza status:    []   Current   [x]   Patient declined    Pneumococcal status:  []   Current  [x]   Patient declined  Covid status:   [x]  Dose #1:                     [x]  Dose #2:               []   Patient declined    Smoking Status:    [] Smoker - PPD:   [] Nonsmoker - Quit Date:               [x] Never a smoker      Cancer Screening:  Colonoscopy   [] Current       [x] Not current   [] Not current, but scheduled   [] NA  Mammogram   [] Current       [] Not current   [] Not current, but scheduled   [x] NA  Prostate           [x] Current       [] Not current   [] Not current, but scheduled   [] NA  PAP/Pelvic      [] Current       [] Not current   [] Not current, but scheduled   [x] NA  Skin                 [] Current       [x] Not current   [] Not current, but scheduled   [] NA     Hormone:  Lupron []   Last dose given:           Next dose due:   Eligard []   Last dose given:           Next dose due:   Aromatase Inhibitors []   Medication name:   N/A:  []           FALLS RISK SCREEN  Instructions:  Assess the patient and enter the appropriate indicators that are present for fall risk identification. Total the numbers entered and assign a fall risk score from Table 2.  Reassess patient at a minimum every 12 weeks or with status change. Assessment   Date  7/7/2022     1. Mental Ability: confusion/cognitively impaired 0     2. Elimination Issues: incontinence, frequency 3       3. Ambulatory: use of assistive devices (walker, cane, off-loading devices),        attached to equipment (IV pole, oxygen) 0     4. Sensory Limitations: dizziness, vertigo, impaired vision 0     5. Age less than 65        0     6. Age 72 or greater 0     7. Medication: diuretics, strong analgesics, hypnotics, sedatives,        antihypertensive agents 3   8. Falls:  recent history of falls within the last 3 months (not to include slipping or        tripping) 0   TOTAL 3    If score of 4 or greater was education given? No           TABLE 2   Risk Score Risk Level Plan of Care   0-3 Little or  No Risk 1. Provide assistance as indicated for ambulation activities  2. Reorient confused/cognitively impaired patient  3. Chair/bed in low position, stretcher/bed with siderails up except when performing patient care activities  5. Educate patient/family/caregiver on falls prevention  6.  Reassess in 12 weeks or with any noted change in patient condition which places them at a risk for a fall   4-6 Moderate Risk 1. Provide assistance as indicated for ambulation activities  2. Reorient confused/cognitively impaired patient  3.   Chair/bed in low position, stretcher/bed with siderails up except when performing patient care activities  4. Educate patient/family/caregiver on falls prevention     7 or   Higher High Risk 1. Place patient in easily observable treatment room  2. Patient attended at all times by family member or staff  3. Provide assistance as indicated for ambulation activities  4. Reorient confused/cognitively impaired patient  5. Chair/bed in low position, stretcher/bed with siderails up except when performing patient care activities  6. Educate patient/family/caregiver on falls prevention         PLAN: Patient is seen today in follow up and has never received radiation therapy. Reports urinary frequency and leakage if he drinks too much before bedtime. Gets up once per night to empty bladder. Patient to return in 6 months with a PSA.         Warden Quincy RN

## 2022-07-08 NOTE — PROGRESS NOTES
[address]            Radiation Oncology          [address] [facility], [address]        Dania Coats: [phone]        F: [phone]       [facility]       Radiation Oncology   [address], [address] [facility]       Dania Coats: [phone]       F: [phone]       [email or web address]      Date of Service: 2022     Location:  [address] [place] [address] [place],   [address] [facility]., [facility], [facility]   [phone]        RADIATION ONCOLOGY FOLLOW UP NOTE    Patient ID:   Matthew Pablo  : 1960   MRN: [id number]    DIAGNOSIS:  Cancer Staging  Prostate cancer [facility])  Staging form: Prostate, AJCC 8th Edition  - Pathologic stage from 2021: Stage IIIB (pT3a, pN0, cM0, PSA: 15.9, Grade Group: 2) - Signed by Elroy Rubio MD on 6/15/2021    -s/p RP 21 GS 3+4 pT3a with (+) margins    INTERVAL HISTORY:   Matthew Pablo is a 59 y.o.. 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 Patient was seen for consultation as he had a detectable postoperative PSA of 0.05 on 2021. Patient was referred for pelvic floor rehabilitation as he was having issues with urinary continence postoperatively. Patient comes in today for follow-up visit and notes that he has had significant improvement in his urinary control. He does still have occasional leakage however attributes it mostly to certain movements. Patient still has erectile dysfunction though is using Viagra. Patient otherwise denies any other acute symptoms at this time headaches, dizziness, chest pain, shortness of breath, abdominal pain, nausea, weight loss, fatigue, bony pain, swelling, or bleeding.   He denies any other urinary or bowel symptoms. He did have a repeat PSA 2022 which has continued to stay low and stable at 0.05. He will have a repeat PSA drawn today. AUA Score: NA    MEDICATIONS:    Current Outpatient Medications:     sildenafil (VIAGRA) 100 MG tablet, Take 1 tablet by mouth daily as needed for Erectile Dysfunction, Disp: 15 tablet, Rfl: 3    Vitamin D (CHOLECALCIFEROL) 25 MCG (1000 UT) TABS tablet, Take 1,000 Units by mouth daily, Disp: , Rfl:     CIALIS 5 MG tablet, Take 1 tablet by mouth daily (Patient not taking: Reported on 3/25/2022), Disp: 40 tablet, Rfl: 11    SITagliptin (JANUVIA) 50 MG tablet, Take 50 mg by mouth daily, Disp: , Rfl:     atenolol (TENORMIN) 50 MG tablet, Take 50 mg by mouth daily, Disp: , Rfl:     metFORMIN (GLUCOPHAGE) 1000 MG tablet, Take 500 mg by mouth daily (with breakfast) , Disp: , Rfl:     amLODIPine (NORVASC) 10 MG tablet, Take 10 mg by mouth daily (Patient not taking: Reported on 2021), Disp: , Rfl:     hydroCHLOROthiazide (HYDRODIURIL) 25 MG tablet, Take 25 mg by mouth daily, Disp: , Rfl:     lisinopril (PRINIVIL;ZESTRIL) 40 MG tablet, Take 40 mg by mouth daily, Disp: , Rfl:     pravastatin (PRAVACHOL) 40 MG tablet, Take 40 mg by mouth daily, Disp: , Rfl:     ALLERGIES:  No Known Allergies      REVIEW OF SYSTEMS:    A full 14 point review of systems was performed and assessed and found to be negative except as noted above. PHYSICAL EXAMINATION:    CHAPERONE: Not Required    ECO Asymptomatic    VITAL SIGNS: BP (!) 151/90   Pulse 63   Temp 97.5 °F (36.4 °C) (Temporal)   Resp 14   Wt 283 lb 6.4 oz (128.5 kg)   BMI 37.39 kg/m²   GENERAL:  General appearance is that of a well-nourished, well-developed in no apparent distress. HEENT: Normocephalic, atraumatic, EOMI, moist mucosa, no erythema. NECK:  No adenopathy or a palpable thyroid mass, trachea is midline. LYMPHATICS: No cervical, supraclavicular adenopathy.   HEART:  Normal rate and regular rhythm, normal heart sounds. LUNGS:  Pulmonary effort normal. Normal breath sounds. ABDOMEN:  Soft, nontender, non distended. EXTREMITIES:  No edema. No calf tenderness. MSK:  No spinal tenderness. Normal ROM. NEUROLOGICAL: Alert and oriented. Strength and sensation intact bilaterally. No focal deficits. PSYCH: Mood normal, behavior normal.  SKIN: No erythema, no desquamation. RECTAL: Deferred     LABS:  WBC   Date Value Ref Range Status   2021 6.9 3.5 - 11.3 k/uL Final     Segs Absolute   Date Value Ref Range Status   2021 3.75 1.50 - 8.10 k/uL Final     Hemoglobin   Date Value Ref Range Status   2021 12.3 (L) 13.0 - 17.0 g/dL Final     Platelets   Date Value Ref Range Status   2021 357 138 - 453 k/uL Final     No results found for: , CEA  PSA   Date Value Ref Range Status   2022 0.06 <4.1 ng/mL Final     Comment:     The Roche \"ECLIA\" assay is used. Results obtained with different assay methods cannot be   used interchangeably. 2022 0.05 <4.1 ug/L Final     Comment:     The Roche \"ECLIA\" assay is used. Results obtained with different assay methods cannot be   used interchangeably. 2022 0.05 <4.1 ug/L Final     Comment:     The Roche \"ECLIA\" assay is used. Results obtained with different assay methods cannot be   used interchangeably. 2021 0.03 <4.1 ug/L Final     Comment:     The Roche \"ECLIA\" assay is used. Results obtained with different assay methods cannot be   used interchangeably. 2021 0.02 <4.1 ug/L Final     Comment:     The Roche \"ECLIA\" assay is used. Results obtained with different assay methods cannot be   used interchangeably.          IMAGIN/9/20 Bone Scan     Impression   Midline frontal skull activity is typically on the basis of hyperostosis or   sinus disease, less likely metastatic disease given location and lack of   widespread findings elsewhere.  Correlate with PSA and continued attention on   follow-up.       Multifocal degenerative changes are otherwise favored as outlined above. 12/9/20 CT Abd/Pel  Impression   1.  No evidence for metastatic disease in the abdomen or pelvis.       2.  Mild fat induration surrounding the bladder, question underlying cystitis. ASSESSMENT AND PLAN:  Leno Zazueta is a 59 y.o. male with a Cancer Staging  Prostate cancer ([facility] [address]  Staging form: Prostate, AJCC 8th Edition  - Pathologic stage from 1/13/2021: Stage IIIB (pT3a, pN0, cM0, PSA: 15.9, Grade Group: 2) - Signed by Gio Merrill MD on 6/15/2021  . Patient comes in today for a follow-up visit approximately 1 year and half after his radical prostatectomy for his above-stated prostate cancer. Patient has been doing well with recovering his urinary continence. He has had significant improvement in his urinary control denying any issues with incontinence now. We did reviewed that his most recent PSA still is fairly low still at 0.05 and below the threshold for which consider biochemical failure which would be 0.1 to 0.2. We did review the indications for adjuvant radiation therapy given his positive margin and extracapsular extension, as well as the role for early salvage radiation therapy for patients who have rising PSAs. However to avoid treatment toxicities and to allow patient further healing with his erectile function and bladder control we will continue monitoring his PSA until it has reached this threshold, and will recommend he have 1 drawn today. Patient will be recommended to follow-up with urology as well to discuss his erectile dysfunction treatment options. The patient continue to have a low PSA, we recommend patient have another follow-up with repeat PSA in 6 months and a follow-up visit afterwards to review the results. Patient will again be reevaluated for consideration of early salvage radiation therapy.   Patient was also notified to contact us should he have any changes or concerns in his urinary symptoms. Patient was in agreement with my recommendations. All questions were answered to their satisfaction. Patient was advised to contact us anytime should they have any questions or concerns. Electronically signed by Aislinn Osman MD on 7/8/2022 at 2:03 PM        Medications Prescribed:   New Prescriptions    No medications on file       Orders:   Orders Placed This Encounter   Procedures    PSA, Diagnostic       CC:  Patient Care Team:  Mariano Nichols MD as PCP - Helga Hampton MD as Consulting Physician (Nephrology)     Total time spent on this case on the day of encounter is 30 minutes. This time includes combination of one or more of the following - review of necessary tests, review of pertinent medical records from the EMR, performing medically appropriate examination and evaluation, counseling and educating the patient/family/caregiver, ordering necessary medical tests, procedures etc., documenting the clinical information in the electronic medical record, care coordination, referring and communicating with other health care providers and interpretation of results independently. This note is created with the assistance of a speech recognition program.  While intending to generate a document that actually reflects the content of the visit, the document can still have some errors including those of syntax and sound a like substitutions which may escape proof reading. It such instances, actual meaning can be extrapolated by contextual diversion.

## 2022-09-02 ENCOUNTER — HOSPITAL ENCOUNTER (OUTPATIENT)
Age: 62
Setting detail: SPECIMEN
Discharge: HOME | End: 2022-09-02

## 2022-09-02 DIAGNOSIS — N18.30 STAGE 3 CHRONIC KIDNEY DISEASE, UNSPECIFIED WHETHER STAGE 3A OR 3B CKD (HCC): ICD-10-CM

## 2022-09-02 LAB
ANION GAP SERPL CALCULATED.3IONS-SCNC: 14 MMOL/L (ref 9–17)
BUN BLDV-MCNC: 38 MG/DL (ref 8–23)
CALCIUM SERPL-MCNC: 9.1 MG/DL (ref 8.6–10.4)
CHLORIDE BLD-SCNC: 103 MMOL/L (ref 98–107)
CO2: 22 MMOL/L (ref 20–31)
CREAT SERPL-MCNC: 2.97 MG/DL (ref 0.7–1.2)
CREATININE URINE: 284.5 MG/DL (ref 39–259)
GFR AFRICAN AMERICAN: 26 ML/MIN
GFR NON-AFRICAN AMERICAN: 21 ML/MIN
GFR SERPL CREATININE-BSD FRML MDRD: ABNORMAL ML/MIN/{1.73_M2}
GLUCOSE BLD-MCNC: 87 MG/DL (ref 70–99)
POTASSIUM SERPL-SCNC: 3.8 MMOL/L (ref 3.7–5.3)
SODIUM BLD-SCNC: 139 MMOL/L (ref 135–144)
TOTAL PROTEIN, URINE: 19 MG/DL

## 2022-09-07 ENCOUNTER — HOSPITAL ENCOUNTER (OUTPATIENT)
Age: 62
Setting detail: SPECIMEN
Discharge: HOME | End: 2022-09-07

## 2022-09-07 DIAGNOSIS — N18.30 STAGE 3 CHRONIC KIDNEY DISEASE, UNSPECIFIED WHETHER STAGE 3A OR 3B CKD (HCC): ICD-10-CM

## 2022-09-07 LAB
ANION GAP SERPL CALCULATED.3IONS-SCNC: 12 MMOL/L (ref 9–17)
BUN BLDV-MCNC: 27 MG/DL (ref 8–23)
CALCIUM SERPL-MCNC: 9.5 MG/DL (ref 8.6–10.4)
CHLORIDE BLD-SCNC: 100 MMOL/L (ref 98–107)
CO2: 27 MMOL/L (ref 20–31)
CREAT SERPL-MCNC: 1.79 MG/DL (ref 0.7–1.2)
GFR AFRICAN AMERICAN: 47 ML/MIN
GFR NON-AFRICAN AMERICAN: 38 ML/MIN
GFR SERPL CREATININE-BSD FRML MDRD: ABNORMAL ML/MIN/{1.73_M2}
GLUCOSE BLD-MCNC: 90 MG/DL (ref 70–99)
POTASSIUM SERPL-SCNC: 3.8 MMOL/L (ref 3.7–5.3)
SODIUM BLD-SCNC: 139 MMOL/L (ref 135–144)

## 2022-09-08 ENCOUNTER — OFFICE VISIT (OUTPATIENT)
Dept: UROLOGY | Age: 62
End: 2022-09-08
Payer: MEDICARE

## 2022-09-08 VITALS
BODY MASS INDEX: 36.98 KG/M2 | WEIGHT: 279 LBS | SYSTOLIC BLOOD PRESSURE: 130 MMHG | DIASTOLIC BLOOD PRESSURE: 78 MMHG | HEIGHT: 73 IN | HEART RATE: 63 BPM | OXYGEN SATURATION: 99 %

## 2022-09-08 DIAGNOSIS — C61 PROSTATE CANCER (HCC): Primary | ICD-10-CM

## 2022-09-08 DIAGNOSIS — R97.20 ELEVATED PSA: ICD-10-CM

## 2022-09-08 PROCEDURE — 1036F TOBACCO NON-USER: CPT | Performed by: UROLOGY

## 2022-09-08 PROCEDURE — G8417 CALC BMI ABV UP PARAM F/U: HCPCS | Performed by: UROLOGY

## 2022-09-08 PROCEDURE — G8427 DOCREV CUR MEDS BY ELIG CLIN: HCPCS | Performed by: UROLOGY

## 2022-09-08 PROCEDURE — 3017F COLORECTAL CA SCREEN DOC REV: CPT | Performed by: UROLOGY

## 2022-09-08 PROCEDURE — 99213 OFFICE O/P EST LOW 20 MIN: CPT | Performed by: UROLOGY

## 2022-09-08 ASSESSMENT — ENCOUNTER SYMPTOMS
WHEEZING: 0
SHORTNESS OF BREATH: 0
COUGH: 0

## 2022-09-08 NOTE — PROGRESS NOTES
[address] [address]  Dept: [address] [facility] Urology Office Note - Established    Patient:  Amanda Shukla  YOB: 1960  Date: 9/8/2022    The patient is a 57 y.o. male who presents todayfor evaluation of the following problems:   Chief Complaint   Patient presents with    Elevated PSA       HPI  Here for prostate cancer. Had rrp in 2020. Doing well, no new issues. Psa is 0.06. No incontinence, has ED    Summary of old records: N/A    Additional History: N/A    Procedures Today: N/A    Urinalysis today:  No results found for this visit on 09/08/22. Last several PSA's:  Lab Results   Component Value Date    PSA 0.06 07/07/2022    PSA 0.05 03/07/2022    PSA 0.05 01/04/2022     Last total testosterone:  No results found for: TESTOSTERONE    AUA Symptom Score (9/8/2022):                                Last BUN and creatinine:  Lab Results   Component Value Date    BUN 27 (H) 09/07/2022     Lab Results   Component Value Date    CREATININE 1.79 (H) 09/07/2022       Additional Lab/Culture results: none    Imaging Reviewed during this Office Visit: none  (results were independently reviewed by physician and radiology report verified)    PAST MEDICAL, FAMILY AND SOCIAL HISTORY UPDATE:  Past Medical History:   Diagnosis Date    Back pain     Diabetes mellitus ([address]     Dyslipidemia     Elevated PSA     Erectile dysfunction     History of echocardiogram 04/2014    EF 55%, mild LVH, mild MR, moderate TR    Hyperlipemia     Hypertension     LVH (left ventricular hypertrophy)     Pedal edema     Prostate cancer (HCC)     Systolic murmur     Wellness examination     pcp-phone visit dec 2020     Past Surgical History:   Procedure Laterality Date    HERNIA REPAIR      as baby    PROSTATE BIOPSY N/A 11/17/2020    PROSTATE BIOPSY W/ US & TECH TO OR performed by Latha Ortiz MD at [address] PROSTATECTOMY  01/13/2021    robotic/laprascopic/pelvic lymph node dissection    PROSTATECTOMY N/A 1/13/2021    XI ROBOTIC LAPAROSCOPIC PROSTATECTOMY, PELVIC LYMPH NODE DISSECTION performed by Emma French MD at [address]  11/17/2020    US PROSTATE NEEDLE PUNCH 11/17/2020 [facility] ULTRASOUND     Family History   Problem Relation Age of Onset    Diabetes Mother     High Blood Pressure Mother     Cancer Father      Outpatient Medications Marked as Taking for the 9/8/22 encounter (Office Visit) with Emma French MD   Medication Sig Dispense Refill    sildenafil (VIAGRA) 100 MG tablet Take 1 tablet by mouth daily as needed for Erectile Dysfunction 15 tablet 3    Vitamin D (CHOLECALCIFEROL) 25 MCG (1000 UT) TABS tablet Take 1,000 Units by mouth daily      SITagliptin (JANUVIA) 50 MG tablet Take 50 mg by mouth daily      atenolol (TENORMIN) 50 MG tablet Take 50 mg by mouth daily      metFORMIN (GLUCOPHAGE) 1000 MG tablet Take 500 mg by mouth daily (with breakfast)       hydroCHLOROthiazide (HYDRODIURIL) 25 MG tablet Take 25 mg by mouth daily      lisinopril (PRINIVIL;ZESTRIL) 40 MG tablet Take 40 mg by mouth daily      pravastatin (PRAVACHOL) 40 MG tablet Take 40 mg by mouth daily         Patient has no known allergies. Social History     Tobacco Use   Smoking Status Never   Smokeless Tobacco Never     (Ifpatient a smoker, smoking cessation counseling offered)    Social History     Substance and Sexual Activity   Alcohol Use Yes    Comment: socially       REVIEW OF SYSTEMS:  Review of Systems    Physical Exam:      Vitals:    09/08/22 1028   BP: 130/78   Pulse: 63   SpO2: 99%     Body mass index is 36.81 kg/m². Patient is a 57 y.o. male in no acute distress and alert and oriented to person, place and time. Physical Exam  Constitutional: Patient in no acute distress. Neuro: Alert and oriented to person, place and time.   Psych: Mood normal, affect normal  Skin: No rash noted  HEENT: Head: Normocephalic andatraumatic  Conjunctivae and EOM are normal. Pupils are equal, round  Nose:Normal  Right External Ear: Normal; Left External Ear: Normal  Mouth: Mucosa Moist  Neck: Supple  Lungs: Respiratory effort is normal  Cardiovascular: Warm & Pink  Abdomen: Soft, non-tender, non-distended with no CVA,  No flank tenderness,  Or hepatosplenomegaly   Lymphatics: No palpablelymphadenopathy. Assessment and Plan      1. Prostate cancer ([address]    2. Elevated PSA           Plan:       Return in about 6 months (around 3/8/2023) for Follow up. Prescriptions Ordered:  No orders of the defined types were placed in this encounter. Orders Placed:  Orders Placed This Encounter   Procedures    PSA, Diagnostic     Standing Status:   Future     Standing Expiration Date:   9/8/2023           Mamta Dejesus MD    Agree with the ROS entered by the MA.

## 2022-09-15 NOTE — TELEPHONE ENCOUNTER
New referral in que contacted patient to schedule no answer message left referral remains in que. Tissue Cultured Epidermal Autograft Text: The defect edges were debeveled with a #15 scalpel blade.  Given the location of the defect, shape of the defect and the proximity to free margins a tissue cultured epidermal autograft was deemed most appropriate.  The graft was then trimmed to fit the size of the defect.  The graft was then placed in the primary defect and oriented appropriately.

## 2023-01-05 ENCOUNTER — HOSPITAL ENCOUNTER (OUTPATIENT)
Age: 63
Setting detail: SPECIMEN
Discharge: HOME | End: 2023-01-05

## 2023-01-05 DIAGNOSIS — N17.9 AKI (ACUTE KIDNEY INJURY) (HCC): ICD-10-CM

## 2023-01-05 DIAGNOSIS — N18.30 STAGE 3 CHRONIC KIDNEY DISEASE, UNSPECIFIED WHETHER STAGE 3A OR 3B CKD (HCC): ICD-10-CM

## 2023-01-05 DIAGNOSIS — I10 ESSENTIAL HYPERTENSION: ICD-10-CM

## 2023-01-05 DIAGNOSIS — R60.0 BILATERAL LOWER EXTREMITY EDEMA: ICD-10-CM

## 2023-01-05 LAB
ALBUMIN SERPL-MCNC: 4.1 G/DL (ref 3.5–5.2)
ANION GAP SERPL CALCULATED.3IONS-SCNC: 10 MMOL/L (ref 9–17)
BUN BLDV-MCNC: 19 MG/DL (ref 8–23)
CALCIUM SERPL-MCNC: 9.3 MG/DL (ref 8.6–10.4)
CHLORIDE BLD-SCNC: 97 MMOL/L (ref 98–107)
CO2: 29 MMOL/L (ref 20–31)
CREAT SERPL-MCNC: 1.8 MG/DL (ref 0.7–1.2)
CREATININE URINE: 130.6 MG/DL (ref 39–259)
GFR SERPL CREATININE-BSD FRML MDRD: 42 ML/MIN/1.73M2
GLUCOSE BLD-MCNC: 174 MG/DL (ref 70–99)
HCT VFR BLD CALC: 45.6 % (ref 40.7–50.3)
HEMOGLOBIN: 14.6 G/DL (ref 13–17)
MCH RBC QN AUTO: 30.8 PG (ref 25.2–33.5)
MCHC RBC AUTO-ENTMCNC: 32 G/DL (ref 28.4–34.8)
MCV RBC AUTO: 96.2 FL (ref 82.6–102.9)
NRBC AUTOMATED: 0 PER 100 WBC
PDW BLD-RTO: 13.2 % (ref 11.8–14.4)
PHOSPHORUS: 3.2 MG/DL (ref 2.5–4.5)
PLATELET # BLD: 345 K/UL (ref 138–453)
PMV BLD AUTO: 11.4 FL (ref 8.1–13.5)
POTASSIUM SERPL-SCNC: 3.9 MMOL/L (ref 3.7–5.3)
PTH INTACT: 45.3 PG/ML (ref 14–72)
RBC # BLD: 4.74 M/UL (ref 4.21–5.77)
SODIUM BLD-SCNC: 136 MMOL/L (ref 135–144)
TOTAL PROTEIN, URINE: 5 MG/DL
URINE TOTAL PROTEIN CREATININE RATIO: 0.04 (ref 0–0.2)
VITAMIN D 25-HYDROXY: 30.4 NG/ML
WBC # BLD: 7.7 K/UL (ref 3.5–11.3)

## 2023-01-12 ENCOUNTER — HOSPITAL ENCOUNTER (OUTPATIENT)
Dept: RADIATION ONCOLOGY | Age: 63
Discharge: HOME | End: 2023-01-12
Payer: MEDICARE

## 2023-01-12 ENCOUNTER — HOSPITAL ENCOUNTER (OUTPATIENT)
Age: 63
Setting detail: SPECIMEN
Discharge: HOME | End: 2023-01-12

## 2023-01-12 VITALS
RESPIRATION RATE: 16 BRPM | HEART RATE: 60 BPM | WEIGHT: 270.8 LBS | TEMPERATURE: 97.8 F | DIASTOLIC BLOOD PRESSURE: 82 MMHG | SYSTOLIC BLOOD PRESSURE: 125 MMHG | BODY MASS INDEX: 35.73 KG/M2

## 2023-01-12 DIAGNOSIS — C61 PROSTATE CANCER (HCC): Chronic | ICD-10-CM

## 2023-01-12 DIAGNOSIS — C61 PROSTATE CANCER (HCC): Primary | Chronic | ICD-10-CM

## 2023-01-12 LAB — PROSTATE SPECIFIC ANTIGEN: 0.07 NG/ML

## 2023-01-12 PROCEDURE — 99212 OFFICE O/P EST SF 10 MIN: CPT | Performed by: RADIOLOGY

## 2023-01-12 NOTE — PROGRESS NOTES
Alma Cable  1/12/2023  10:51 AM      Vitals:    01/12/23 1030   BP: 125/82   Pulse: 60   Resp: 16   Temp: 97.8 °F (36.6 °C)    :     Pain Assessment: None - Denies Pain          Wt Readings from Last 1 Encounters:   01/12/23 270 lb 12.8 oz (122.8 kg)                Current Outpatient Medications:     dapagliflozin (FARXIGA) 10 MG tablet, Take 10 mg by mouth every morning, Disp: , Rfl:     sildenafil (VIAGRA) 100 MG tablet, Take 1 tablet by mouth daily as needed for Erectile Dysfunction, Disp: 15 tablet, Rfl: 3    Vitamin D (CHOLECALCIFEROL) 25 MCG (1000 UT) TABS tablet, Take 1,000 Units by mouth daily, Disp: , Rfl:     SITagliptin (JANUVIA) 50 MG tablet, Take 50 mg by mouth daily, Disp: , Rfl:     atenolol (TENORMIN) 50 MG tablet, Take 50 mg by mouth daily, Disp: , Rfl:     metFORMIN (GLUCOPHAGE) 1000 MG tablet, Take 500 mg by mouth daily (with breakfast)  (Patient not taking: Reported on 1/12/2023), Disp: , Rfl:     hydroCHLOROthiazide (HYDRODIURIL) 25 MG tablet, Take 25 mg by mouth daily, Disp: , Rfl:     lisinopril (PRINIVIL;ZESTRIL) 40 MG tablet, Take 40 mg by mouth daily, Disp: , Rfl:     pravastatin (PRAVACHOL) 40 MG tablet, Take 40 mg by mouth daily, Disp: , Rfl:     Immunizations:    Influenza status:    []   Current   [x]   Patient declined    Pneumococcal status:  []   Current  [x]   Patient declined  Covid status:   [x]  Dose #1:                     [x]  Dose #2:               []   Patient declined    Smoking Status:    [] Smoker - PPD:   [] Nonsmoker - Quit Date:               [x] Never a smoker      Cancer Screening:  Colonoscopy   [x] Current       [] Not current   [] Not current, but scheduled   [] NA  Mammogram   [] Current       [] Not current   [] Not current, but scheduled   [x] NA  Prostate           [] Current       [x] Not current   [] Not current, but scheduled   [] NA  PAP/Pelvic      [] Current       [] Not current   [] Not current, but scheduled   [x] NA  Skin                 [] Current [x] Not current   [] Not current, but scheduled   [] NA     Hormone:  Lupron []   Last dose given:           Next dose due:   Eligard []   Last dose given:           Next dose due:   Aromatase Inhibitors []   Medication name:   N/A:  [x]           FALLS RISK SCREEN  Instructions:  Assess the patient and enter the appropriate indicators that are present for fall risk identification. Total the numbers entered and assign a fall risk score from Table 2.  Reassess patient at a minimum every 12 weeks or with status change. Assessment   Date  1/12/2023     1. Mental Ability: confusion/cognitively impaired 0     2. Elimination Issues: incontinence, frequency 0       3. Ambulatory: use of assistive devices (walker, cane, off-loading devices),        attached to equipment (IV pole, oxygen) 0     4. Sensory Limitations: dizziness, vertigo, impaired vision 0     5. Age less than 65        0     6. Age 72 or greater 0     7. Medication: diuretics, strong analgesics, hypnotics, sedatives,        antihypertensive agents 3   8. Falls:  recent history of falls within the last 3 months (not to include slipping or        tripping) 0   TOTAL 3    If score of 4 or greater was education given? N/A           TABLE 2   Risk Score Risk Level Plan of Care   0-3 Little or  No Risk 1. Provide assistance as indicated for ambulation activities  2. Reorient confused/cognitively impaired patient  3. Chair/bed in low position, stretcher/bed with siderails up except when performing patient care activities  5. Educate patient/family/caregiver on falls prevention  6.  Reassess in 12 weeks or with any noted change in patient condition which places them at a risk for a fall   4-6 Moderate Risk 1. Provide assistance as indicated for ambulation activities  2. Reorient confused/cognitively impaired patient  3. Chair/bed in low position, stretcher/bed with siderails up except when performing patient care activities  4.   Educate patient/family/caregiver on falls prevention     7 or   Higher High Risk 1. Place patient in easily observable treatment room  2. Patient attended at all times by family member or staff  3. Provide assistance as indicated for ambulation activities  4. Reorient confused/cognitively impaired patient  5. Chair/bed in low position, stretcher/bed with siderails up except when performing patient care activities  6. Educate patient/family/caregiver on falls prevention         PLAN: Patient is seen today in follow up. Denies questions or concerns and states no changes to his urinary symptoms. Pt does not have a recent PSA. Continues to follow with Dr. Gabby Mcmahon. Patient get his PSA checked and return for follow up in 6 months.         Marcelo Saini RN

## 2023-01-15 NOTE — PROGRESS NOTES
[address]            Radiation Oncology          [address] [facility]          [place], [address]        Donna May: [phone]        F: [phone]       [address] [facility]       Radiation Oncology   [address], [address] [facility]       Donna Woodwarder: [phone]       F: [phone]       [email or web address]      Date of Service: 2023     Location:  [address],   [address] [facility] [place]   [phone]        RADIATION ONCOLOGY FOLLOW UP NOTE    Patient ID:   Isabel Alan  : 1960   MRN: [id number]    DIAGNOSIS:  Cancer Staging  Prostate cancer [facility])  Staging form: Prostate, AJCC 8th Edition  - Pathologic stage from 2021: Stage IIIB (pT3a, pN0, cM0, PSA: 15.9, Grade Group: 2) - Signed by Bob Turner MD on 6/15/2021    -s/p RP 21 GS 3+4 pT3a with (+) margins    INTERVAL HISTORY:   Isabel Alan is a 57 y.o.. 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 Patient was seen for consultation as he had a detectable postoperative PSA of 0.05 on 2021. Patient was referred for pelvic floor rehabilitation as he was having issues with urinary continence postoperatively. Patient comes in today for follow-up visit and notes that he has had significant improvement in his urinary control. He does still have occasional leakage however attributes it mostly to certain movements. Patient still has erectile dysfunction and is using Viagra. Patient otherwise denies any other acute symptoms at this time headaches, dizziness, chest pain, shortness of breath, abdominal pain, nausea, weight loss, fatigue, bony pain, swelling, or bleeding.   He denies any other urinary or bowel symptoms. He did have a repeat PSA 2022 which has continued to stay low and stable at 0.06. He will have a repeat PSA drawn today. AUA Score: NA    MEDICATIONS:    Current Outpatient Medications:     dapagliflozin (FARXIGA) 10 MG tablet, Take 10 mg by mouth every morning, Disp: , Rfl:     sildenafil (VIAGRA) 100 MG tablet, Take 1 tablet by mouth daily as needed for Erectile Dysfunction, Disp: 15 tablet, Rfl: 3    Vitamin D (CHOLECALCIFEROL) 25 MCG (1000 UT) TABS tablet, Take 1,000 Units by mouth daily, Disp: , Rfl:     SITagliptin (JANUVIA) 50 MG tablet, Take 50 mg by mouth daily, Disp: , Rfl:     atenolol (TENORMIN) 50 MG tablet, Take 50 mg by mouth daily, Disp: , Rfl:     metFORMIN (GLUCOPHAGE) 1000 MG tablet, Take 500 mg by mouth daily (with breakfast)  (Patient not taking: Reported on 2023), Disp: , Rfl:     hydroCHLOROthiazide (HYDRODIURIL) 25 MG tablet, Take 25 mg by mouth daily, Disp: , Rfl:     lisinopril (PRINIVIL;ZESTRIL) 40 MG tablet, Take 40 mg by mouth daily, Disp: , Rfl:     pravastatin (PRAVACHOL) 40 MG tablet, Take 40 mg by mouth daily, Disp: , Rfl:     ALLERGIES:  No Known Allergies      REVIEW OF SYSTEMS:    A full 14 point review of systems was performed and assessed and found to be negative except as noted above. PHYSICAL EXAMINATION:    CHAPERONE: Not Required    ECO Asymptomatic    VITAL SIGNS: /82   Pulse 60   Temp 97.8 °F (36.6 °C) (Temporal)   Resp 16   Wt 270 lb 12.8 oz (122.8 kg)   BMI 35.73 kg/m²   GENERAL:  General appearance is that of a well-nourished, well-developed in no apparent distress. HEENT: Normocephalic, atraumatic, EOMI, moist mucosa, no erythema. NECK:  No adenopathy or a palpable thyroid mass, trachea is midline. LYMPHATICS: No cervical, supraclavicular adenopathy. HEART:  Normal rate and regular rhythm, normal heart sounds. LUNGS:  Pulmonary effort normal. Normal breath sounds.    ABDOMEN:  Soft, nontender, non distended. EXTREMITIES:  No edema. No calf tenderness. MSK:  No spinal tenderness. Normal ROM. NEUROLOGICAL: Alert and oriented. Strength and sensation intact bilaterally. No focal deficits. PSYCH: Mood normal, behavior normal.  SKIN: No erythema, no desquamation. RECTAL: Deferred     LABS:  WBC   Date Value Ref Range Status   2023 7.7 3.5 - 11.3 k/uL Final     Segs Absolute   Date Value Ref Range Status   2021 3.75 1.50 - 8.10 k/uL Final     Hemoglobin   Date Value Ref Range Status   2023 14.6 13.0 - 17.0 g/dL Final     Platelets   Date Value Ref Range Status   2023 345 138 - 453 k/uL Final     No results found for: , CEA  PSA   Date Value Ref Range Status   2023 0.07 <4.1 ng/mL Final     Comment:     The Roche \"ECLIA\" assay is used. Results obtained with different assay methods cannot be   used interchangeably. 2022 0.06 <4.1 ng/mL Final     Comment:     The Roche \"ECLIA\" assay is used. Results obtained with different assay methods cannot be   used interchangeably. 2022 0.05 <4.1 ug/L Final     Comment:     The Roche \"ECLIA\" assay is used. Results obtained with different assay methods cannot be   used interchangeably. 2022 0.05 <4.1 ug/L Final     Comment:     The Roche \"ECLIA\" assay is used. Results obtained with different assay methods cannot be   used interchangeably. 2021 0.03 <4.1 ug/L Final     Comment:     The Roche \"ECLIA\" assay is used. Results obtained with different assay methods cannot be   used interchangeably. IMAGIN/9/20 Bone Scan     Impression   Midline frontal skull activity is typically on the basis of hyperostosis or   sinus disease, less likely metastatic disease given location and lack of   widespread findings elsewhere. Correlate with PSA and continued attention on   follow-up. Multifocal degenerative changes are otherwise favored as outlined above.      20 CT Abd/Pel  Impression   1. No evidence for metastatic disease in the abdomen or pelvis. 2.  Mild fat induration surrounding the bladder, question underlying cystitis. ASSESSMENT AND PLAN:  Sharee Luna is a 57 y.o. male with a Cancer Staging  Prostate cancer ([facility] [address]  Staging form: Prostate, AJCC 8th Edition  - Pathologic stage from 1/13/2021: Stage IIIB (pT3a, pN0, cM0, PSA: 15.9, Grade Group: 2) - Signed by Sam Galdamez MD on 6/15/2021  . Patient comes in today for a follow-up visit approximately 2 years after his radical prostatectomy for his above-stated prostate cancer. Patient has been doing well with recovering his urinary continence. He has had significant improvement in his urinary control denying any issues with incontinence now. We did reviewed that his most recent PSA still is fairly low still at 0.06 and below the threshold for which consider biochemical failure which would be 0.1 to 0.2. We did review the indications for adjuvant radiation therapy given his positive margin and extracapsular extension, as well as the role for early salvage radiation therapy for patients who have rising PSAs. However to avoid treatment toxicities and to allow patient further healing with his erectile function and bladder control we will continue monitoring his PSA until it has reached this threshold, and will recommend he have 1 drawn today. Patient will be recommended to follow-up with urology as well to discuss his erectile dysfunction treatment options. The patient continue to have a low PSA, we recommend patient have another follow-up with repeat PSA in 6 months and a follow-up visit afterwards to review the results. Patient will again be reevaluated for consideration of early salvage radiation therapy. Patient was also notified to contact us should he have any changes or concerns in his urinary symptoms. Patient was in agreement with my recommendations.  All questions were answered to their satisfaction. Patient was advised to contact us anytime should they have any questions or concerns. Electronically signed by Michelle Caballero MD on 1/15/2023 at 6:20 PM        Medications Prescribed:   New Prescriptions    No medications on file       Orders:   Orders Placed This Encounter   Procedures    PSA, Diagnostic    PSA, Diagnostic       CC:  Patient Care Team:  Salvador Reynolds MD as PCP - Phylicia Barnett MD as Consulting Physician (Nephrology)     Total time spent on this case on the day of encounter is 30 minutes. This time includes combination of one or more of the following - review of necessary tests, review of pertinent medical records from the EMR, performing medically appropriate examination and evaluation, counseling and educating the patient/family/caregiver, ordering necessary medical tests, procedures etc., documenting the clinical information in the electronic medical record, care coordination, referring and communicating with other health care providers and interpretation of results independently. This note is created with the assistance of a speech recognition program.  While intending to generate a document that actually reflects the content of the visit, the document can still have some errors including those of syntax and sound a like substitutions which may escape proof reading. It such instances, actual meaning can be extrapolated by contextual diversion.

## 2023-02-07 NOTE — PROGRESS NOTES
PFT 5- 80% improvement, dry at night, less leak only with extreme exertion and rare now.  1 PPD     PF tone: 58.1  Stim amp 30.0  Time: 8.59 min 07-Feb-2023 14:32

## 2023-03-09 ENCOUNTER — OFFICE VISIT (OUTPATIENT)
Dept: UROLOGY | Age: 63
End: 2023-03-09
Payer: MEDICARE

## 2023-03-09 VITALS
BODY MASS INDEX: 35.78 KG/M2 | SYSTOLIC BLOOD PRESSURE: 103 MMHG | RESPIRATION RATE: 16 BRPM | HEART RATE: 87 BPM | TEMPERATURE: 97.8 F | WEIGHT: 270 LBS | HEIGHT: 73 IN | DIASTOLIC BLOOD PRESSURE: 73 MMHG

## 2023-03-09 DIAGNOSIS — N39.3 STRESS INCONTINENCE OF URINE: ICD-10-CM

## 2023-03-09 DIAGNOSIS — N52.01 ERECTILE DYSFUNCTION DUE TO ARTERIAL INSUFFICIENCY: ICD-10-CM

## 2023-03-09 DIAGNOSIS — C61 PROSTATE CANCER (HCC): Primary | ICD-10-CM

## 2023-03-09 PROCEDURE — G8427 DOCREV CUR MEDS BY ELIG CLIN: HCPCS | Performed by: UROLOGY

## 2023-03-09 PROCEDURE — G8417 CALC BMI ABV UP PARAM F/U: HCPCS | Performed by: UROLOGY

## 2023-03-09 PROCEDURE — 99214 OFFICE O/P EST MOD 30 MIN: CPT | Performed by: UROLOGY

## 2023-03-09 PROCEDURE — G8484 FLU IMMUNIZE NO ADMIN: HCPCS | Performed by: UROLOGY

## 2023-03-09 PROCEDURE — 3078F DIAST BP <80 MM HG: CPT | Performed by: UROLOGY

## 2023-03-09 PROCEDURE — 1036F TOBACCO NON-USER: CPT | Performed by: UROLOGY

## 2023-03-09 PROCEDURE — 3074F SYST BP LT 130 MM HG: CPT | Performed by: UROLOGY

## 2023-03-09 PROCEDURE — 3017F COLORECTAL CA SCREEN DOC REV: CPT | Performed by: UROLOGY

## 2023-03-09 ASSESSMENT — ENCOUNTER SYMPTOMS
CONSTIPATION: 0
NAUSEA: 0
ABDOMINAL PAIN: 0
EYE PAIN: 0
SHORTNESS OF BREATH: 0
DIARRHEA: 0
VOMITING: 0
BACK PAIN: 0
COUGH: 0
WHEEZING: 0

## 2023-03-09 NOTE — PROGRESS NOTES
[address] [address]  Dept: [address] [facility] Urology Office Note - Established    Patient:  Brando Reyna  YOB: 1960  Date: 3/9/2023    The patient is a 57 y.o. male who presents todayfor evaluation of the following problems:   Chief Complaint   Patient presents with    Prostate Cancer     6 months w/ psa       HPI  Here for prostate cancer, had locally adv. Psa is slowly rising. Seeing radiation already. Hasnt received it yet. Incontinence improving. Still with ED    Summary of old records: N/A    Additional History: N/A    Procedures Today: N/A    Urinalysis today:  No results found for this visit on 03/09/23. Last several PSA's:  Lab Results   Component Value Date    PSA 0.07 01/12/2023    PSA 0.06 07/07/2022    PSA 0.05 03/07/2022     Last total testosterone:  No results found for: TESTOSTERONE    AUA Symptom Score (3/9/2023):   INCOMPLETE EMPTYING: How often have you had the sensation of not emptying your bladder?: Not at all  FREQUENCY: How often do you have to urinate less than every two hours?: Not at all  INTERMITTENCY: How often have you found you stopped and started again several times when you urinated?: Not at all  URGENCY: How often have you found it difficult to postpone urination?: Not at all  WEAK STREAM: How often have you had a weak urinary stream?: Not at all  STRAINING: How often have you had to strain to start  urination?: Not at all  NOCTURIA: How many times did you typically get up at night to uriniate?: 1 Time  TOTAL I-PSS SCORE[de-identified] 1       Last BUN and creatinine:  Lab Results   Component Value Date    BUN 19 01/05/2023     Lab Results   Component Value Date    CREATININE 1.80 (H) 01/05/2023       Additional Lab/Culture results: none    Imaging Reviewed during this Office Visit: none  (results were independently reviewed by physician and radiology report verified)    PAST MEDICAL, FAMILY AND SOCIAL HISTORY UPDATE:  Past Medical History:   Diagnosis Date    Back pain     Diabetes mellitus (HCC)     Dyslipidemia     Elevated PSA     Erectile dysfunction     History of echocardiogram 04/2014    EF 55%, mild LVH, mild MR, moderate TR    Hyperlipemia     Hypertension     LVH (left ventricular hypertrophy)     Pedal edema     Prostate cancer (HCC)     Systolic murmur     Wellness examination     pcp-phone visit dec 2020     Past Surgical History:   Procedure Laterality Date    HERNIA REPAIR      as baby    PROSTATE BIOPSY N/A 11/17/2020    PROSTATE BIOPSY W/ US & TECH TO OR performed by Stacey Acevedo MD at [address] [facility]  01/13/2021    robotic/laprascopic/pelvic lymph node dissection    PROSTATECTOMY N/A 1/13/2021    XI ROBOTIC LAPAROSCOPIC PROSTATECTOMY, PELVIC LYMPH NODE DISSECTION performed by Stacey Acevedo MD at [address]  11/17/2020    US PROSTATE NEEDLE PUNCH 11/17/2020 [facility] ULTRASOUND     Family History   Problem Relation Age of Onset    Diabetes Mother     High Blood Pressure Mother     Cancer Father      Outpatient Medications Marked as Taking for the 3/9/23 encounter (Office Visit) with Stacey Acevedo MD   Medication Sig Dispense Refill    dapagliflozin (FARXIGA) 10 MG tablet Take 10 mg by mouth every morning      sildenafil (VIAGRA) 100 MG tablet Take 1 tablet by mouth daily as needed for Erectile Dysfunction 15 tablet 3    Vitamin D (CHOLECALCIFEROL) 25 MCG (1000 UT) TABS tablet Take 1,000 Units by mouth daily      SITagliptin (JANUVIA) 50 MG tablet Take 50 mg by mouth daily      atenolol (TENORMIN) 50 MG tablet Take 50 mg by mouth daily      hydroCHLOROthiazide (HYDRODIURIL) 25 MG tablet Take 25 mg by mouth daily      lisinopril (PRINIVIL;ZESTRIL) 40 MG tablet Take 40 mg by mouth daily      pravastatin (PRAVACHOL) 40 MG tablet Take 40 mg by mouth daily         Patient has no known allergies.   Social History     Tobacco Use Smoking Status Never   Smokeless Tobacco Never     (Ifpatient a smoker, smoking cessation counseling offered)    Social History     Substance and Sexual Activity   Alcohol Use Yes    Comment: socially       REVIEW OF SYSTEMS:  Review of Systems    Physical Exam:      Vitals:    03/09/23 0931   BP: 103/73   Pulse: 87   Resp: 16   Temp: 97.8 °F (36.6 °C)     Body mass index is 35.62 kg/m². Patient is a 57 y.o. male in no acute distress and alert and oriented to person, place and time. Physical Exam  Constitutional: Patient in no acute distress. Neuro: Alert and oriented to person, place and time. Psych: Mood normal, affect normal  Skin: No rash noted  HEENT: Head: Normocephalic andatraumatic  Conjunctivae and EOM are normal. Pupils are equal, round  Nose:Normal  Right External Ear: Normal; Left External Ear: Normal  Mouth: Mucosa Moist  Neck: Supple  Lungs: Respiratory effort is normal  Cardiovascular: Warm & Pink  Abdomen: Soft, non-tender, non-distended with no CVA,  No flank tenderness,  Or hepatosplenomegaly       Assessment and Plan      1. Prostate cancer ([address]    2. Stress incontinence of urine    3. Erectile dysfunction due to arterial insufficiency           Plan:       Return in about 3 months (around 6/9/2023) for Follow up. Prescriptions Ordered:  No orders of the defined types were placed in this encounter. Orders Placed:  Orders Placed This Encounter   Procedures    PSA, Diagnostic     Standing Status:   Future     Standing Expiration Date:   3/8/2024             Loan Bob MD    Agree with the ROS entered by the MA.

## 2023-03-24 ENCOUNTER — HOSPITAL ENCOUNTER (OUTPATIENT)
Age: 63
Setting detail: SPECIMEN
Discharge: HOME | End: 2023-03-24

## 2023-03-24 DIAGNOSIS — N18.30 STAGE 3 CHRONIC KIDNEY DISEASE, UNSPECIFIED WHETHER STAGE 3A OR 3B CKD (HCC): ICD-10-CM

## 2023-03-24 PROBLEM — N18.32 STAGE 3B CHRONIC KIDNEY DISEASE (HCC): Status: ACTIVE | Noted: 2021-09-24

## 2023-03-24 LAB
ANION GAP SERPL CALCULATED.3IONS-SCNC: 12 MMOL/L (ref 9–17)
BUN SERPL-MCNC: 29 MG/DL (ref 8–23)
CALCIUM SERPL-MCNC: 9.6 MG/DL (ref 8.6–10.4)
CHLORIDE SERPL-SCNC: 101 MMOL/L (ref 98–107)
CO2 SERPL-SCNC: 27 MMOL/L (ref 20–31)
CREAT SERPL-MCNC: 1.77 MG/DL (ref 0.7–1.2)
GFR SERPL CREATININE-BSD FRML MDRD: 42 ML/MIN/1.73M2
GLUCOSE SERPL-MCNC: 154 MG/DL (ref 70–99)
POTASSIUM SERPL-SCNC: 4.3 MMOL/L (ref 3.7–5.3)
SODIUM SERPL-SCNC: 140 MMOL/L (ref 135–144)

## 2023-04-03 ENCOUNTER — TELEPHONE (OUTPATIENT)
Dept: UROLOGY | Age: 63
End: 2023-04-03

## 2023-06-29 ENCOUNTER — HOSPITAL ENCOUNTER (OUTPATIENT)
Age: 63
Setting detail: SPECIMEN
Discharge: HOME | End: 2023-06-29

## 2023-06-29 DIAGNOSIS — N18.30 STAGE 3 CHRONIC KIDNEY DISEASE, UNSPECIFIED WHETHER STAGE 3A OR 3B CKD (HCC): ICD-10-CM

## 2023-06-29 DIAGNOSIS — C61 PROSTATE CANCER (HCC): ICD-10-CM

## 2023-06-29 LAB
ANION GAP SERPL CALCULATED.3IONS-SCNC: 14 MMOL/L (ref 9–17)
BUN SERPL-MCNC: 28 MG/DL (ref 8–23)
CALCIUM SERPL-MCNC: 9.6 MG/DL (ref 8.6–10.4)
CHLORIDE SERPL-SCNC: 101 MMOL/L (ref 98–107)
CO2 SERPL-SCNC: 23 MMOL/L (ref 20–31)
CREAT SERPL-MCNC: 1.97 MG/DL (ref 0.7–1.2)
CREAT UR-MCNC: 191.9 MG/DL (ref 39–259)
GFR SERPL CREATININE-BSD FRML MDRD: 37 ML/MIN/1.73M2
GLUCOSE SERPL-MCNC: 115 MG/DL (ref 70–99)
POTASSIUM SERPL-SCNC: 3.8 MMOL/L (ref 3.7–5.3)
PSA SERPL-MCNC: 0.08 NG/ML
SODIUM SERPL-SCNC: 138 MMOL/L (ref 135–144)
TOTAL PROTEIN, URINE: 18 MG/DL

## 2023-07-06 ENCOUNTER — HOSPITAL ENCOUNTER (OUTPATIENT)
Age: 63
Setting detail: SPECIMEN
Discharge: HOME | End: 2023-07-06

## 2023-07-06 DIAGNOSIS — N18.30 STAGE 3 CHRONIC KIDNEY DISEASE, UNSPECIFIED WHETHER STAGE 3A OR 3B CKD (HCC): ICD-10-CM

## 2023-07-06 LAB
ANION GAP SERPL CALCULATED.3IONS-SCNC: 12 MMOL/L (ref 9–17)
BUN SERPL-MCNC: 31 MG/DL (ref 8–23)
CALCIUM SERPL-MCNC: 9.1 MG/DL (ref 8.6–10.4)
CHLORIDE SERPL-SCNC: 99 MMOL/L (ref 98–107)
CO2 SERPL-SCNC: 25 MMOL/L (ref 20–31)
CREAT SERPL-MCNC: 2.4 MG/DL (ref 0.7–1.2)
GFR SERPL CREATININE-BSD FRML MDRD: 29 ML/MIN/1.73M2
GLUCOSE SERPL-MCNC: 200 MG/DL (ref 70–99)
POTASSIUM SERPL-SCNC: 3.5 MMOL/L (ref 3.7–5.3)
SODIUM SERPL-SCNC: 136 MMOL/L (ref 135–144)

## 2023-08-15 ENCOUNTER — HOSPITAL ENCOUNTER (OUTPATIENT)
Dept: RADIATION ONCOLOGY | Age: 63
Discharge: HOME | End: 2023-08-15
Payer: MEDICARE

## 2023-08-15 VITALS
SYSTOLIC BLOOD PRESSURE: 102 MMHG | OXYGEN SATURATION: 98 % | HEART RATE: 63 BPM | BODY MASS INDEX: 35.67 KG/M2 | WEIGHT: 270.4 LBS | DIASTOLIC BLOOD PRESSURE: 59 MMHG | TEMPERATURE: 98.2 F | RESPIRATION RATE: 16 BRPM

## 2023-08-15 DIAGNOSIS — C61 PROSTATE CANCER (HCC): Primary | Chronic | ICD-10-CM

## 2023-08-15 PROCEDURE — 99212 OFFICE O/P EST SF 10 MIN: CPT | Performed by: RADIOLOGY

## 2023-08-15 PROCEDURE — 99214 OFFICE O/P EST MOD 30 MIN: CPT | Performed by: RADIOLOGY

## 2024-02-09 ENCOUNTER — HOSPITAL ENCOUNTER (OUTPATIENT)
Age: 64
Discharge: HOME | End: 2024-02-09
Payer: MEDICAID

## 2024-02-09 DIAGNOSIS — I10 ESSENTIAL HYPERTENSION: ICD-10-CM

## 2024-02-09 DIAGNOSIS — C61 PROSTATE CANCER (HCC): Chronic | ICD-10-CM

## 2024-02-09 DIAGNOSIS — N18.32 STAGE 3B CHRONIC KIDNEY DISEASE (HCC): ICD-10-CM

## 2024-02-09 DIAGNOSIS — N18.30 TYPE 2 DIABETES MELLITUS WITH STAGE 3 CHRONIC KIDNEY DISEASE, WITHOUT LONG-TERM CURRENT USE OF INSULIN, UNSPECIFIED WHETHER STAGE 3A OR 3B CKD (HCC): Chronic | ICD-10-CM

## 2024-02-09 DIAGNOSIS — E11.22 TYPE 2 DIABETES MELLITUS WITH STAGE 3 CHRONIC KIDNEY DISEASE, WITHOUT LONG-TERM CURRENT USE OF INSULIN, UNSPECIFIED WHETHER STAGE 3A OR 3B CKD (HCC): Chronic | ICD-10-CM

## 2024-02-09 LAB
25(OH)D3 SERPL-MCNC: 26.6 NG/ML
ALBUMIN SERPL-MCNC: 4.2 G/DL (ref 3.5–5.2)
ANION GAP SERPL CALCULATED.3IONS-SCNC: 9 MMOL/L (ref 9–17)
BUN SERPL-MCNC: 21 MG/DL (ref 8–23)
CALCIUM SERPL-MCNC: 9.3 MG/DL (ref 8.6–10.4)
CHLORIDE SERPL-SCNC: 98 MMOL/L (ref 98–107)
CO2 SERPL-SCNC: 28 MMOL/L (ref 20–31)
CREAT SERPL-MCNC: 1.8 MG/DL (ref 0.7–1.2)
CREAT UR-MCNC: 118 MG/DL (ref 39–259)
ERYTHROCYTE [DISTWIDTH] IN BLOOD BY AUTOMATED COUNT: 13.3 % (ref 11.8–14.4)
GFR SERPL CREATININE-BSD FRML MDRD: 41 ML/MIN/1.73M2
GLUCOSE SERPL-MCNC: 143 MG/DL (ref 70–99)
HCT VFR BLD AUTO: 45.2 % (ref 40.7–50.3)
HGB BLD-MCNC: 14.8 G/DL (ref 13–17)
MCH RBC QN AUTO: 30 PG (ref 25.2–33.5)
MCHC RBC AUTO-ENTMCNC: 32.7 G/DL (ref 28.4–34.8)
MCV RBC AUTO: 91.7 FL (ref 82.6–102.9)
NRBC BLD-RTO: 0 PER 100 WBC
PHOSPHATE SERPL-MCNC: 3 MG/DL (ref 2.5–4.5)
PLATELET # BLD AUTO: 318 K/UL (ref 138–453)
PMV BLD AUTO: 10.9 FL (ref 8.1–13.5)
POTASSIUM SERPL-SCNC: 4.2 MMOL/L (ref 3.7–5.3)
PSA SERPL-MCNC: 0.11 NG/ML
PTH-INTACT SERPL-MCNC: 49.9 PG/ML (ref 14–72)
RBC # BLD AUTO: 4.93 M/UL (ref 4.21–5.77)
SODIUM SERPL-SCNC: 135 MMOL/L (ref 135–144)
TOTAL PROTEIN, URINE: 8 MG/DL
URINE TOTAL PROTEIN CREATININE RATIO: 0.07
WBC OTHER # BLD: 7.9 K/UL (ref 3.5–11.3)

## 2024-02-09 PROCEDURE — 82570 ASSAY OF URINE CREATININE: CPT

## 2024-02-09 PROCEDURE — 80048 BASIC METABOLIC PNL TOTAL CA: CPT

## 2024-02-09 PROCEDURE — 82040 ASSAY OF SERUM ALBUMIN: CPT

## 2024-02-09 PROCEDURE — 85027 COMPLETE CBC AUTOMATED: CPT

## 2024-02-09 PROCEDURE — 83970 ASSAY OF PARATHORMONE: CPT

## 2024-02-09 PROCEDURE — 36415 COLL VENOUS BLD VENIPUNCTURE: CPT

## 2024-02-09 PROCEDURE — 84153 ASSAY OF PSA TOTAL: CPT

## 2024-02-09 PROCEDURE — 84156 ASSAY OF PROTEIN URINE: CPT

## 2024-02-09 PROCEDURE — 84100 ASSAY OF PHOSPHORUS: CPT

## 2024-02-09 PROCEDURE — 82306 VITAMIN D 25 HYDROXY: CPT

## 2024-02-15 ENCOUNTER — HOSPITAL ENCOUNTER (OUTPATIENT)
Dept: RADIATION ONCOLOGY | Age: 64
Discharge: HOME | End: 2024-02-15
Payer: MEDICARE

## 2024-02-15 VITALS
TEMPERATURE: 97.5 F | DIASTOLIC BLOOD PRESSURE: 87 MMHG | WEIGHT: 274.8 LBS | BODY MASS INDEX: 36.26 KG/M2 | HEART RATE: 64 BPM | RESPIRATION RATE: 16 BRPM | SYSTOLIC BLOOD PRESSURE: 138 MMHG

## 2024-02-15 DIAGNOSIS — C61 PROSTATE CANCER (HCC): Primary | Chronic | ICD-10-CM

## 2024-02-15 PROCEDURE — 99213 OFFICE O/P EST LOW 20 MIN: CPT | Performed by: RADIOLOGY

## 2024-02-15 NOTE — DISCHARGE INSTRUCTIONS
Bowel Prep for Simulation Appointment          3 days prior to appt:  take 2 Senokot/Senokot-S tablets at bedtime.     2 days prior to appt: If no bowel movement in the morning, take 2 more tablets.             If no bowel movement by bedtime, take 3 more tablets.        **If bowel movement has occurred continue taking 2 tablets at bedtime daily**    1 day prior to appt:  If still no bowel movement in the morning, take 3 more tablets.            If no bowel movement by bedtime, take 3 more tablets.      Day of Simulation:  If no bowel movement yet use fleets enema 2 hours prior to appointment.  Follow directions on packaging.      Make sure you are drinking 6-8 glasses of water daily.    If diarrhea occurs at any time, hold Senokot and call nurse if questions or concerns at [phone].

## 2024-02-15 NOTE — PROGRESS NOTES
Danyel Palmer  2/15/2024  11:30 AM      Vitals:    02/15/24 1030   BP: 138/87   Pulse: 64   Resp: 16   Temp: 97.5 °F (36.4 °C)    :     Pain Assessment: 0-10  Pain Level: 0       Wt Readings from Last 1 Encounters:   02/15/24 124.6 kg (274 lb 12.8 oz)                Current Outpatient Medications:     dapagliflozin (FARXIGA) 10 MG tablet, Take 1 tablet by mouth every morning, Disp: , Rfl:     sildenafil (VIAGRA) 100 MG tablet, Take 1 tablet by mouth daily as needed for Erectile Dysfunction, Disp: 15 tablet, Rfl: 3    Vitamin D (CHOLECALCIFEROL) 25 MCG (1000 UT) TABS tablet, Take 1 tablet by mouth daily, Disp: , Rfl:     SITagliptin (JANUVIA) 50 MG tablet, Take 1 tablet by mouth daily, Disp: , Rfl:     atenolol (TENORMIN) 50 MG tablet, Take 1 tablet by mouth daily, Disp: , Rfl:     hydroCHLOROthiazide (HYDRODIURIL) 25 MG tablet, Take 1 tablet by mouth daily, Disp: , Rfl:     lisinopril (PRINIVIL;ZESTRIL) 40 MG tablet, Take 1 tablet by mouth daily, Disp: , Rfl:     pravastatin (PRAVACHOL) 40 MG tablet, Take 1 tablet by mouth daily, Disp: , Rfl:                FALLS RISK SCREEN  Instructions:  Assess the patient and enter the appropriate indicators that are present for fall risk identification.   Total the numbers entered and assign a fall risk score from Table 2.  Reassess patient at a minimum every 12 weeks or with status change.    Assessment   Date  2/15/2024     1.  Mental Ability: confusion/cognitively impaired 0     2.  Elimination Issues: incontinence, frequency 0       3.  Ambulatory: use of assistive devices (walker, cane, off-loading devices),        attached to equipment (IV pole, oxygen) 0     4.  Sensory Limitations: dizziness, vertigo, impaired vision 0     5.  Age less than 65        0     6.  Age 65 or greater 1     7.  Medication: diuretics, strong analgesics, hypnotics, sedatives,        antihypertensive agents 3   8.  Falls:  recent history of falls within the last 3 months (not to include slipping

## 2024-04-30 ENCOUNTER — HOSPITAL ENCOUNTER (OUTPATIENT)
Age: 64
Setting detail: SPECIMEN
Discharge: HOME | End: 2024-04-30

## 2024-04-30 DIAGNOSIS — C61 PROSTATE CANCER (HCC): Chronic | ICD-10-CM

## 2024-04-30 LAB — PSA SERPL-MCNC: 0.1 NG/ML (ref 0–4)

## 2024-05-06 ENCOUNTER — HOSPITAL ENCOUNTER (OUTPATIENT)
Dept: RADIATION ONCOLOGY | Age: 64
Discharge: HOME | End: 2024-05-06
Payer: MEDICARE

## 2024-05-06 VITALS
SYSTOLIC BLOOD PRESSURE: 137 MMHG | OXYGEN SATURATION: 96 % | HEART RATE: 55 BPM | TEMPERATURE: 98 F | RESPIRATION RATE: 16 BRPM | DIASTOLIC BLOOD PRESSURE: 87 MMHG | WEIGHT: 277.8 LBS | BODY MASS INDEX: 36.65 KG/M2

## 2024-05-06 PROCEDURE — 77334 RADIATION TREATMENT AID(S): CPT | Performed by: RADIOLOGY

## 2024-05-06 PROCEDURE — 99212 OFFICE O/P EST SF 10 MIN: CPT | Performed by: RADIOLOGY

## 2024-05-06 NOTE — PROGRESS NOTES
Danyel Palmer  5/6/2024  10:24 AM      Vitals:    05/06/24 0927   BP: 137/87   Pulse: 55   Resp: 16   Temp: 98 °F (36.7 °C)   SpO2: 96%    :     Pain Assessment: None - Denies Pain          Wt Readings from Last 1 Encounters:   05/06/24 126 kg (277 lb 12.8 oz)                Current Outpatient Medications:     dapagliflozin (FARXIGA) 10 MG tablet, Take 1 tablet by mouth every morning, Disp: , Rfl:     sildenafil (VIAGRA) 100 MG tablet, Take 1 tablet by mouth daily as needed for Erectile Dysfunction, Disp: 15 tablet, Rfl: 3    Vitamin D (CHOLECALCIFEROL) 25 MCG (1000 UT) TABS tablet, Take 1 tablet by mouth daily, Disp: , Rfl:     SITagliptin (JANUVIA) 50 MG tablet, Take 1 tablet by mouth daily, Disp: , Rfl:     atenolol (TENORMIN) 50 MG tablet, Take 1 tablet by mouth daily, Disp: , Rfl:     hydroCHLOROthiazide (HYDRODIURIL) 25 MG tablet, Take 1 tablet by mouth daily, Disp: , Rfl:     lisinopril (PRINIVIL;ZESTRIL) 40 MG tablet, Take 1 tablet by mouth daily, Disp: , Rfl:     pravastatin (PRAVACHOL) 40 MG tablet, Take 1 tablet by mouth daily, Disp: , Rfl:     Hormone:  Lupron []   Last dose given:           Next dose due:   Eligard []   Last dose given:           Next dose due:   Anti-Estrogen Hormonal Therapy []   Medication name:   N/A:  [x]           FALLS RISK SCREEN  Instructions:  Assess the patient and enter the appropriate indicators that are present for fall risk identification.   Total the numbers entered and assign a fall risk score from Table 2.  Reassess patient at a minimum every 12 weeks or with status change.    Assessment   Date  5/6/2024     1.  Mental Ability: confusion/cognitively impaired 0     2.  Elimination Issues: incontinence, frequency 0       3.  Ambulatory: use of assistive devices (walker, cane, off-loading devices),        attached to equipment (IV pole, oxygen) 0     4.  Sensory Limitations: dizziness, vertigo, impaired vision 0     5.  Age less than 65        0     6.  Age 65 or greater

## 2024-05-06 NOTE — PROGRESS NOTES
Radiation Treatment Site/Plan/Fractions:Prostate  28 daily      Concurrent Chemotherapy/Immunotherapy:None      Cardiac Device:None      Transportation Concerns:no      Nursing Referrals and Reasons:none  Contrast Given:Oral          Miscellaneous Information:He arrives ambulatory with steady gait.  Information regarding treatment simulation and daily treatment provided orally and in written form. Questions answered to his satisfaction. Patient drinking water and oral contrast to ensure full bladder.  He was escorted to CT room without difficulty.

## 2024-05-06 NOTE — DISCHARGE INSTRUCTIONS
You may want to talk with your boss about ways to work from home so you do not need to commute. If possible, you may want to think about going on medical leave while you have radiation therapy.         Urinary and Bladder Changes  What they are:Radiation therapy can cause urinary and bladder problems, which can include:  Burning or pain when you begin to urinate or after you urinate (empty your bladder)  Trouble starting to urinate  Trouble emptying your bladder completely  Frequent, urgent need to urinate  Cystitis, a swelling (inflammation) in your urinary tract  Incontinence, when you cannot control the flow of urine from your bladder, especially when coughing or sneezing  Waking frequently to urinate  Blood in your urine  Bladder spasms, which are like painful muscle cramps    Ways to Manage Urinary and Bladder Changes  Drink lots of fluids. Drink 6 to 8 cups of fluids each day, enough so that your urine is clear to light yellow in color  Avoid coffee, black tea, alcohol, spices, and all tobacco products  Talk with your doctor or nurse if you think you have urinary or bladder problems. You may need to provide a urine sample to check whether you have an infection  Talk with your doctor or nurse if you have incontinence. He or she may refer you to a physical therapist who will assess your problem. The therapist can give you exercises to improve bladder control.  Medicine. Your doctor may prescribe antibiotics if your problems are caused by an infection. Other medicines can help you urinate, reduce burning or pain, and ease bladder spasm  [facility] [place]

## 2024-05-07 ENCOUNTER — CLINICAL DOCUMENTATION (OUTPATIENT)
Dept: ONCOLOGY | Age: 64
End: 2024-05-07

## 2024-05-07 NOTE — PROGRESS NOTES
[facility] Nutrition Screen:    PG-SGA screening form reviewed. Score = 1. RD referral/nutrition evaluation indicated for scores > 4. Full nutrition assessment not needed at this time. Please consult oncology dietitian with any future nutrition concerns/needs.

## 2024-05-08 NOTE — PROGRESS NOTES
[facility]            Radiation Oncology          [address]          [place] [address]        O: [phone]        F: [phone]       [employer]            Date of Service: 2024     Location:  [facility] Radiation Oncology,   [address] [place] [id number] [phone]        RADIATION ONCOLOGY FOLLOW UP NOTE    Patient ID:   Danyel Palmer  : 1960   MRN: [id number]    DIAGNOSIS:  Cancer Staging  Prostate cancer (HCC)  Staging form: Prostate, AJCC 8th Edition  - Pathologic stage from 2021: Stage IIIB (pT3a, pN0, cM0, PSA: 15.9, Grade Group: 2) - Signed by Dewey Ulloa MD on 6/15/2021    -s/p RP 21 GS 3+4 pT3a with (+) margins    INTERVAL HISTORY:   Danyel Palmer is a 63 y.o.. 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  Patient was seen for consultation as he had a detectable postoperative PSA of 0.05 on 2021.  Patient was referred for pelvic floor rehabilitation as he was having issues with urinary continence postoperatively.  Patient comes in today for follow-up visit and notes that he has had significant improvement in his urinary control.  He now only has occasional leakage and attributes it mostly to certain movements.  Patient still has erectile dysfunction but notes mild improvement.  Patient otherwise denies any other acute symptoms at this time headaches, dizziness, chest pain, shortness of breath, abdominal pain, nausea, weight loss, fatigue, bony pain, swelling, or bleeding.  He denies any other urinary or bowel symptoms.  He had a PSA on 2024 which has risen to 0.11.  We had determined that patient would be recommended to have another PSA in 3 months and if it stays

## 2024-05-14 ENCOUNTER — HOSPITAL ENCOUNTER (OUTPATIENT)
Dept: RADIATION ONCOLOGY | Age: 64
Discharge: HOME | End: 2024-05-14
Payer: MEDICARE

## 2024-05-14 PROCEDURE — 77301 RADIOTHERAPY DOSE PLAN IMRT: CPT | Performed by: RADIOLOGY

## 2024-05-14 PROCEDURE — 77338 DESIGN MLC DEVICE FOR IMRT: CPT | Performed by: RADIOLOGY

## 2024-05-14 PROCEDURE — 77300 RADIATION THERAPY DOSE PLAN: CPT | Performed by: RADIOLOGY

## 2024-05-15 PROCEDURE — 77336 RADIATION PHYSICS CONSULT: CPT | Performed by: RADIOLOGY

## 2024-05-22 ENCOUNTER — HOSPITAL ENCOUNTER (OUTPATIENT)
Dept: RADIATION ONCOLOGY | Age: 64
Discharge: HOME | End: 2024-05-22
Payer: MEDICARE

## 2024-05-22 VITALS
OXYGEN SATURATION: 99 % | TEMPERATURE: 98 F | BODY MASS INDEX: 36.36 KG/M2 | WEIGHT: 275.6 LBS | HEART RATE: 64 BPM | DIASTOLIC BLOOD PRESSURE: 79 MMHG | SYSTOLIC BLOOD PRESSURE: 132 MMHG | RESPIRATION RATE: 16 BRPM

## 2024-05-22 PROCEDURE — 77336 RADIATION PHYSICS CONSULT: CPT | Performed by: RADIOLOGY

## 2024-05-22 PROCEDURE — 77385 HC NTSTY MODUL RAD TX DLVR SMPL: CPT | Performed by: RADIOLOGY

## 2024-05-22 NOTE — PROGRESS NOTES
[facility]       Radiation Oncology          [address] [place] [address]        O: [phone]        F: [phone]       [facility]             RADIATION ONCOLOGY WEEKLY PROGRESS NOTE  Patient ID:   Danyel Palmer  : 1960   MRN: [id number]    Location:  [facility] Radiation Oncology,   [address] [place] [address]   [phone]    DIAGNOSIS:  Cancer Staging   Prostate cancer (HCC)  Staging form: Prostate, AJCC 8th Edition  - Pathologic stage from 2021: Stage IIIB (pT3a, pN0, cM0, PSA: 15.9, Grade Group: 2) - Signed by Dewey Ulloa MD on 6/15/2021      TREATMENT DETAILS:  Treatment Site: Prostate Bed LNs  Actual Dose: 180cGy  Total Planned Dose: 7020cGy  Treatment Technique: IMRT  Fraction Technique: Daily  Therapy imaging monitoring: CBCT daily  Concurrent Systemic Therapy: NA    SUBJECTIVE:   Patient seen for their weekly on treatment evaluation today.  Patient started treatment today and is doing well.  He denies any urinary or bowel symptoms.    OBJECTIVE:   CHAPERONE: Declined    ECO Asymptomatic    VITAL SIGNS: /79   Pulse 64   Temp 98 °F (36.7 °C) (Temporal)   Resp 16   Wt 125 kg (275 lb 9.6 oz)   SpO2 99%   BMI 36.36 kg/m²   Wt Readings from Last 5 Encounters:   24 125 kg (275 lb 9.6 oz)   24 126 kg (277 lb 12.8 oz)   02/15/24 124.6 kg (274 lb 12.8 oz)   23 127 kg (280 lb)   08/15/23 122.7 kg (270 lb 6.4 oz)     GENERAL:  General appearance is that of a well-nourished, well-developed in no apparent distress.  HEART:  Normal rate and regular rhythm, normal heart sounds.   LUNGS:  Pulmonary effort normal. Normal breath sounds.   ABDOMEN:  Soft, nontender, non distended  EXTREMITIES:  No edema.  Normal ROM.  NEUROLOGICAL: Alert and oriented. Strength and sensation intact bilaterally.   PSYCH: Mood normal, behavior normal.  SKIN: No erythema, no desquamation.      LABS:  WBC   Date

## 2024-05-22 NOTE — PROGRESS NOTES
Danyel Palmer  5/22/2024  Wt Readings from Last 3 Encounters:   05/22/24 125 kg (275 lb 9.6 oz)   05/06/24 126 kg (277 lb 12.8 oz)   02/15/24 124.6 kg (274 lb 12.8 oz)     Body mass index is 36.36 kg/m².        Treatment Area:Prostate bed    Patient was seen today for weekly visit.     Comfort Alteration    Fatigue: None    Nutritional Alteration  Anorexia: No  Nausea: No  Vomiting: No     Elimination Alterations  Constipation: no  Diarrhea:  no  Urinary Frequency/Urgency: No  Urinary Retention: No  Dysuria: No  Urinary Incontinence: Yes-stress incontinence  Proctitis: No  Nocturia: No #/night: 1     Emotional  Coping: effective      Skin Alteration   Sensation:intact    Radiation Dermatitis:  Intact [x]     Erythema  []     Discoloration  []     Rash []     Dry desquamation  []     Moist desquamation []           Injury, potential bleeding or infection: none    Lab Results   Component Value Date    WBC 7.9 02/09/2024    HGB 14.8 02/09/2024    HCT 45.2 02/09/2024     02/09/2024         /79   Pulse 64   Temp 98 °F (36.7 °C) (Temporal)   Resp 16   Wt 125 kg (275 lb 9.6 oz)   SpO2 99%   BMI 36.36 kg/m²      Pain Assessment: None - Denies Pain            Assessment/Plan: Patient was seen today for weekly visit.  He arrives ambulatory with steady gait.  First treatment today.  He denies treatment related concerns.  He reports ongoing stress incontinence- doing Kegel exercises as recommended. Dr. Carrasco evaluated patient.  Continue plan of care.    Day Rabago RN

## 2024-05-23 ENCOUNTER — HOSPITAL ENCOUNTER (OUTPATIENT)
Dept: RADIATION ONCOLOGY | Age: 64
Discharge: HOME | End: 2024-05-23
Payer: MEDICARE

## 2024-05-23 PROCEDURE — 77385 HC NTSTY MODUL RAD TX DLVR SMPL: CPT | Performed by: RADIOLOGY

## 2024-05-24 ENCOUNTER — HOSPITAL ENCOUNTER (OUTPATIENT)
Dept: RADIATION ONCOLOGY | Age: 64
Discharge: HOME | End: 2024-05-24
Payer: MEDICARE

## 2024-05-24 PROCEDURE — 77385 HC NTSTY MODUL RAD TX DLVR SMPL: CPT | Performed by: RADIOLOGY

## 2024-05-28 ENCOUNTER — HOSPITAL ENCOUNTER (OUTPATIENT)
Dept: RADIATION ONCOLOGY | Age: 64
End: 2024-05-28
Payer: MEDICARE

## 2024-05-29 ENCOUNTER — HOSPITAL ENCOUNTER (OUTPATIENT)
Dept: RADIATION ONCOLOGY | Age: 64
Discharge: HOME | End: 2024-05-29
Payer: MEDICARE

## 2024-05-29 VITALS
SYSTOLIC BLOOD PRESSURE: 130 MMHG | BODY MASS INDEX: 37.15 KG/M2 | OXYGEN SATURATION: 100 % | WEIGHT: 281.6 LBS | HEART RATE: 59 BPM | DIASTOLIC BLOOD PRESSURE: 82 MMHG | TEMPERATURE: 98.6 F | RESPIRATION RATE: 16 BRPM

## 2024-05-29 PROCEDURE — 77385 HC NTSTY MODUL RAD TX DLVR SMPL: CPT | Performed by: RADIOLOGY

## 2024-05-29 NOTE — PROGRESS NOTES
Danyel Palmer  5/29/2024  Wt Readings from Last 3 Encounters:   05/29/24 127.7 kg (281 lb 9.6 oz)   05/22/24 125 kg (275 lb 9.6 oz)   05/06/24 126 kg (277 lb 12.8 oz)     Body mass index is 37.15 kg/m².        Treatment Area:Prostate bed    Patient was seen today for weekly visit.     Comfort Alteration    Fatigue: Mild    Nutritional Alteration  Anorexia: No  Nausea: No  Vomiting: No     Elimination Alterations  Constipation: no  Diarrhea:  no  Urinary Frequency/Urgency: No  Urinary Retention: No  Dysuria: No  Urinary Incontinence: Yes-stress incontinence  Proctitis: No  Nocturia: No #/night: 1     Emotional  Coping: effective      Skin Alteration   Sensation:intact    Radiation Dermatitis:  Intact [x]     Erythema  []     Discoloration  []     Rash []     Dry desquamation  []     Moist desquamation []           Injury, potential bleeding or infection: none    Lab Results   Component Value Date    WBC 7.9 02/09/2024    HGB 14.8 02/09/2024    HCT 45.2 02/09/2024     02/09/2024         /82   Pulse 59   Temp 98.6 °F (37 °C) (Temporal)   Resp 16   Wt 127.7 kg (281 lb 9.6 oz)   SpO2 100%   BMI 37.15 kg/m²      Pain Assessment: None - Denies Pain            Assessment/Plan: Patient was seen today for weekly visit.  He arrives ambulatory with steady gait. Early in treatment .He denies treatment related concerns.  He reports ongoing stress incontinence- doing Kegel exercises as recommended. He complains of mild constipation.  Encouraged to take stool softener daily until regular BM daily.  He voices understanding. Dr. Carrasco evaluated patient.  Continue plan of care.    Day Rabago RN

## 2024-05-29 NOTE — PROGRESS NOTES
[facility]       Radiation Oncology          [address] [place] [address]        O: [phone]        F: [phone]       [facility]             RADIATION ONCOLOGY WEEKLY PROGRESS NOTE  Patient ID:   Danyel Palmer  : 1960   MRN: [id number]    Location:  [facility] Radiation Oncology,   [address] [place] [address]   [phone]    DIAGNOSIS:  Cancer Staging   Prostate cancer (HCC)  Staging form: Prostate, AJCC 8th Edition  - Pathologic stage from 2021: Stage IIIB (pT3a, pN0, cM0, PSA: 15.9, Grade Group: 2) - Signed by Dewey Ulloa MD on 6/15/2021      TREATMENT DETAILS:  Treatment Site: Prostate Bed LNs  Actual Dose: 720cGy  Total Planned Dose: 7020cGy  Treatment Technique: IMRT  Fraction Technique: Daily  Therapy imaging monitoring: CBCT daily  Concurrent Systemic Therapy: NA    SUBJECTIVE:   Patient seen for their weekly on treatment evaluation today.  Patient has issues with constipation and was recommended to use a stool softener yesterday as his rectum was too full for treatment.  He was able to go today though still felt like he was straining.  He denies any urinary symptoms.  He denies any fatigue symptoms.    OBJECTIVE:   CHAPERONE: Declined    ECO Asymptomatic    VITAL SIGNS: /82   Pulse 59   Temp 98.6 °F (37 °C) (Temporal)   Resp 16   Wt 127.7 kg (281 lb 9.6 oz)   SpO2 100%   BMI 37.15 kg/m²   Wt Readings from Last 5 Encounters:   24 127.7 kg (281 lb 9.6 oz)   24 125 kg (275 lb 9.6 oz)   24 126 kg (277 lb 12.8 oz)   02/15/24 124.6 kg (274 lb 12.8 oz)   23 127 kg (280 lb)     GENERAL:  General appearance is that of a well-nourished, well-developed in no apparent distress.  HEART:  Normal rate and regular rhythm, normal heart sounds.   LUNGS:  Pulmonary effort normal. Normal breath sounds.   ABDOMEN:  Soft, nontender, non distended  EXTREMITIES:  No edema.  Normal

## 2024-05-30 ENCOUNTER — HOSPITAL ENCOUNTER (OUTPATIENT)
Dept: RADIATION ONCOLOGY | Age: 64
Discharge: HOME | End: 2024-05-30
Payer: MEDICARE

## 2024-05-30 PROCEDURE — 77014 CHG CT GUIDANCE RADIATION THERAPY FLDS PLACEMENT: CPT | Performed by: RADIOLOGY

## 2024-05-30 PROCEDURE — 77385 HC NTSTY MODUL RAD TX DLVR SMPL: CPT | Performed by: RADIOLOGY

## 2024-05-31 ENCOUNTER — HOSPITAL ENCOUNTER (OUTPATIENT)
Dept: RADIATION ONCOLOGY | Age: 64
Discharge: HOME | End: 2024-05-31
Payer: MEDICARE

## 2024-05-31 PROCEDURE — 77385 HC NTSTY MODUL RAD TX DLVR SMPL: CPT | Performed by: RADIOLOGY

## 2024-06-03 ENCOUNTER — HOSPITAL ENCOUNTER (OUTPATIENT)
Dept: RADIATION ONCOLOGY | Age: 64
Discharge: HOME | End: 2024-06-03
Payer: MEDICARE

## 2024-06-03 PROCEDURE — 77385 HC NTSTY MODUL RAD TX DLVR SMPL: CPT | Performed by: RADIOLOGY

## 2024-06-04 ENCOUNTER — HOSPITAL ENCOUNTER (OUTPATIENT)
Dept: RADIATION ONCOLOGY | Age: 64
Discharge: HOME | End: 2024-06-04
Payer: MEDICARE

## 2024-06-04 PROCEDURE — 77385 HC NTSTY MODUL RAD TX DLVR SMPL: CPT | Performed by: RADIOLOGY

## 2024-06-05 ENCOUNTER — HOSPITAL ENCOUNTER (OUTPATIENT)
Dept: RADIATION ONCOLOGY | Age: 64
Discharge: HOME | End: 2024-06-05
Payer: MEDICARE

## 2024-06-05 VITALS
WEIGHT: 272.2 LBS | RESPIRATION RATE: 16 BRPM | SYSTOLIC BLOOD PRESSURE: 122 MMHG | DIASTOLIC BLOOD PRESSURE: 80 MMHG | HEART RATE: 66 BPM | OXYGEN SATURATION: 98 % | TEMPERATURE: 98 F | BODY MASS INDEX: 35.91 KG/M2

## 2024-06-05 PROCEDURE — 77336 RADIATION PHYSICS CONSULT: CPT | Performed by: RADIOLOGY

## 2024-06-05 PROCEDURE — 77385 HC NTSTY MODUL RAD TX DLVR SMPL: CPT | Performed by: RADIOLOGY

## 2024-06-05 NOTE — PROGRESS NOTES
[facility]       Radiation Oncology          [address] [place] [address]        O: [phone]        F: [phone]       [facility]             RADIATION ONCOLOGY WEEKLY PROGRESS NOTE  Patient ID:   Danyel Palmer  : 1960   MRN: [id number]    Location:  [facility] Radiation Oncology,   [address] [place] [address]   [phone]    DIAGNOSIS:  Cancer Staging   Prostate cancer (HCC)  Staging form: Prostate, AJCC 8th Edition  - Pathologic stage from 2021: Stage IIIB (pT3a, pN0, cM0, PSA: 15.9, Grade Group: 2) - Signed by Dewey Ulloa MD on 6/15/2021      TREATMENT DETAILS:  Treatment Site: Prostate Bed LNs  Actual Dose: 1620cGy  Total Planned Dose: 7020cGy  Treatment Technique: IMRT  Fraction Technique: Daily  Therapy imaging monitoring: CBCT daily  Concurrent Systemic Therapy: NA    SUBJECTIVE:   Patient seen for their weekly on treatment evaluation today.  Patient has been using his stool softener notes his bowels are moving better.  He denies any urinary issues.  He continues to his Kegel exercises.  He denies any fatigue.       OBJECTIVE:   CHAPERONE: Declined    ECO Asymptomatic    VITAL SIGNS: /80   Pulse 66   Temp 98 °F (36.7 °C) (Temporal)   Resp 16   Wt 123.5 kg (272 lb 3.2 oz)   SpO2 98%   BMI 35.91 kg/m²   Wt Readings from Last 5 Encounters:   24 123.5 kg (272 lb 3.2 oz)   24 127.7 kg (281 lb 9.6 oz)   24 125 kg (275 lb 9.6 oz)   24 126 kg (277 lb 12.8 oz)   02/15/24 124.6 kg (274 lb 12.8 oz)     GENERAL:  General appearance is that of a well-nourished, well-developed in no apparent distress.  HEART:  Normal rate and regular rhythm, normal heart sounds.   LUNGS:  Pulmonary effort normal. Normal breath sounds.   ABDOMEN:  Soft, nontender, non distended  EXTREMITIES:  No edema.  Normal ROM.  NEUROLOGICAL: Alert and oriented. Strength and sensation intact bilaterally.

## 2024-06-05 NOTE — PROGRESS NOTES
Danyel Palmer  6/5/2024  Wt Readings from Last 3 Encounters:   06/05/24 123.5 kg (272 lb 3.2 oz)   05/29/24 127.7 kg (281 lb 9.6 oz)   05/22/24 125 kg (275 lb 9.6 oz)     Body mass index is 35.91 kg/m².        Treatment Area:Prostate bed    Patient was seen today for weekly visit.     Comfort Alteration    Fatigue: Mild    Nutritional Alteration  Anorexia: No  Nausea: No  Vomiting: No     Elimination Alterations  Constipation: no  Diarrhea:  no  Urinary Frequency/Urgency: No  Urinary Retention: No  Dysuria: No  Urinary Incontinence: Yes-stress incontinence  Proctitis: No  Nocturia: No #/night: 1     Emotional  Coping: effective      Skin Alteration   Sensation:intact    Radiation Dermatitis:  Intact [x]     Erythema  []     Discoloration  []     Rash []     Dry desquamation  []     Moist desquamation []           Injury, potential bleeding or infection: none    Lab Results   Component Value Date    WBC 7.9 02/09/2024    HGB 14.8 02/09/2024    HCT 45.2 02/09/2024     02/09/2024         /80   Pulse 66   Temp 98 °F (36.7 °C) (Temporal)   Resp 16   Wt 123.5 kg (272 lb 3.2 oz)   SpO2 98%   BMI 35.91 kg/m²      Pain Assessment: None - Denies Pain            Assessment/Plan: Patient was seen today for weekly visit.  He arrives ambulatory with steady gait. He denies treatment related concerns.  He reports ongoing stress incontinence- doing Kegel exercises as recommended. He continues to take stool softener daily for mild constipation. Dr. Carrasco evaluated patient.  Continue plan of care.    Day Rabago RN

## 2024-06-06 ENCOUNTER — HOSPITAL ENCOUNTER (OUTPATIENT)
Dept: RADIATION ONCOLOGY | Age: 64
Discharge: HOME | End: 2024-06-06
Payer: MEDICARE

## 2024-06-06 PROCEDURE — 77385 HC NTSTY MODUL RAD TX DLVR SMPL: CPT | Performed by: RADIOLOGY

## 2024-06-07 ENCOUNTER — HOSPITAL ENCOUNTER (OUTPATIENT)
Dept: RADIATION ONCOLOGY | Age: 64
Discharge: HOME | End: 2024-06-07
Payer: MEDICARE

## 2024-06-07 PROCEDURE — 77385 HC NTSTY MODUL RAD TX DLVR SMPL: CPT | Performed by: RADIOLOGY

## 2024-06-10 ENCOUNTER — HOSPITAL ENCOUNTER (OUTPATIENT)
Dept: RADIATION ONCOLOGY | Age: 64
Discharge: HOME | End: 2024-06-10
Payer: MEDICARE

## 2024-06-10 PROCEDURE — 77385 HC NTSTY MODUL RAD TX DLVR SMPL: CPT | Performed by: RADIOLOGY

## 2024-06-11 ENCOUNTER — HOSPITAL ENCOUNTER (OUTPATIENT)
Dept: RADIATION ONCOLOGY | Age: 64
Discharge: HOME | End: 2024-06-11
Payer: MEDICARE

## 2024-06-11 PROCEDURE — 77385 HC NTSTY MODUL RAD TX DLVR SMPL: CPT | Performed by: RADIOLOGY

## 2024-06-12 ENCOUNTER — HOSPITAL ENCOUNTER (OUTPATIENT)
Dept: RADIATION ONCOLOGY | Age: 64
Discharge: HOME | End: 2024-06-12
Payer: MEDICARE

## 2024-06-12 VITALS
WEIGHT: 273.4 LBS | OXYGEN SATURATION: 97 % | SYSTOLIC BLOOD PRESSURE: 132 MMHG | HEART RATE: 63 BPM | TEMPERATURE: 97.8 F | DIASTOLIC BLOOD PRESSURE: 75 MMHG | RESPIRATION RATE: 16 BRPM | BODY MASS INDEX: 36.07 KG/M2

## 2024-06-12 PROCEDURE — 77336 RADIATION PHYSICS CONSULT: CPT | Performed by: RADIOLOGY

## 2024-06-12 PROCEDURE — 77385 HC NTSTY MODUL RAD TX DLVR SMPL: CPT | Performed by: RADIOLOGY

## 2024-06-12 ASSESSMENT — PAIN SCALES - GENERAL: PAINLEVEL_OUTOF10: 2

## 2024-06-12 ASSESSMENT — PAIN DESCRIPTION - ORIENTATION: ORIENTATION: LEFT

## 2024-06-12 ASSESSMENT — PAIN DESCRIPTION - LOCATION: LOCATION: TEETH

## 2024-06-12 NOTE — PROGRESS NOTES
[facility]       Radiation Oncology          [address] [place] [address]        O: [phone]        F: [phone]       [facility]             RADIATION ONCOLOGY WEEKLY PROGRESS NOTE  Patient ID:   Danyel Palmer  : 1960   MRN: [id number]    Location:  [facility] Radiation Oncology,   [address] [place] [address]   [phone]    DIAGNOSIS:  Cancer Staging   Prostate cancer (HCC)  Staging form: Prostate, AJCC 8th Edition  - Pathologic stage from 2021: Stage IIIB (pT3a, pN0, cM0, PSA: 15.9, Grade Group: 2) - Signed by Dewey Ulloa MD on 6/15/2021      TREATMENT DETAILS:  Treatment Site: Prostate Bed LNs  Actual Dose: 2520cGy  Total Planned Dose: 7020cGy  Treatment Technique: IMRT  Fraction Technique: Daily  Therapy imaging monitoring: CBCT daily  Concurrent Systemic Therapy: NA    SUBJECTIVE:   Patient seen for their weekly on treatment evaluation today.  Patient has been using his stool softener notes his bowels are stable.  He denies any urinary issues.  He continues to his Kegel exercises.  He denies any fatigue.  He does have a tooth ache and is seeing his dentist tomorrow.    OBJECTIVE:   CHAPERONE: Declined    ECO Asymptomatic    VITAL SIGNS: /80   Pulse 66   Temp 98 °F (36.7 °C) (Temporal)   Resp 16   Wt 123.5 kg (272 lb 3.2 oz)   SpO2 98%   BMI 35.91 kg/m²   Wt Readings from Last 5 Encounters:   24 123.5 kg (272 lb 3.2 oz)   24 127.7 kg (281 lb 9.6 oz)   24 125 kg (275 lb 9.6 oz)   24 126 kg (277 lb 12.8 oz)   02/15/24 124.6 kg (274 lb 12.8 oz)     GENERAL:  General appearance is that of a well-nourished, well-developed in no apparent distress.  HEART:  Normal rate and regular rhythm, normal heart sounds.   LUNGS:  Pulmonary effort normal. Normal breath sounds.   ABDOMEN:  Soft, nontender, non distended  EXTREMITIES:  No edema.  Normal ROM.  NEUROLOGICAL: Alert and

## 2024-06-12 NOTE — PROGRESS NOTES
Danyel Palmer  6/12/2024  Wt Readings from Last 3 Encounters:   06/12/24 124 kg (273 lb 6.4 oz)   06/05/24 123.5 kg (272 lb 3.2 oz)   05/29/24 127.7 kg (281 lb 9.6 oz)     Body mass index is 36.07 kg/m².        Treatment Area: prostate    Patient was seen today for weekly visit.     Comfort Alteration    Fatigue: Mild    Nutritional Alteration  Anorexia: No  Nausea: No  Vomiting: No     Elimination Alterations  Constipation: no  Diarrhea:  no  Urinary Frequency/Urgency: Yes  Urinary Retention: No  Dysuria: No  Urinary Incontinence: No  Proctitis: No  Nocturia: Yes #/night: 2     Emotional  Coping: effective      Skin Alteration   Sensation: normal    Radiation Dermatitis:  Intact [x]     Erythema  []     Discoloration  []     Rash []     Dry desquamation  []     Moist desquamation []           Injury, potential bleeding or infection:  none    Lab Results   Component Value Date    WBC 7.9 02/09/2024    HGB 14.8 02/09/2024    HCT 45.2 02/09/2024     02/09/2024         /75   Pulse 63   Temp 97.8 °F (36.6 °C) (Temporal)   Resp 16   Wt 124 kg (273 lb 6.4 oz)   SpO2 97%   BMI 36.07 kg/m²      Pain Assessment: 0-10  Pain Level: 2         Assessment/Plan: Patient was seen today for weekly visit. Ambulatory on arrival with steady gait.  Pt denies any urinary issues at this time.  States some frequency during the day, but it isn't bad.  Gets up about twice a night to pee.  No burning with urination, no incontinence, no difficulties starting stream.  Decreased appetite due to recent tooth infection on the left side.  Is going to get that checked out tomorrow.  Denies any issues with diarrhea or constipation.  Will continue plan of care.     Briseida Curiel RN

## 2024-06-13 ENCOUNTER — HOSPITAL ENCOUNTER (OUTPATIENT)
Dept: RADIATION ONCOLOGY | Age: 64
Discharge: HOME | End: 2024-06-13
Payer: MEDICARE

## 2024-06-13 PROCEDURE — 77385 HC NTSTY MODUL RAD TX DLVR SMPL: CPT | Performed by: RADIOLOGY

## 2024-06-14 ENCOUNTER — HOSPITAL ENCOUNTER (OUTPATIENT)
Dept: RADIATION ONCOLOGY | Age: 64
Discharge: HOME | End: 2024-06-14
Payer: MEDICARE

## 2024-06-14 PROCEDURE — 77385 HC NTSTY MODUL RAD TX DLVR SMPL: CPT | Performed by: RADIOLOGY

## 2024-06-17 ENCOUNTER — HOSPITAL ENCOUNTER (OUTPATIENT)
Dept: RADIATION ONCOLOGY | Age: 64
Discharge: HOME | End: 2024-06-17
Payer: MEDICARE

## 2024-06-17 PROCEDURE — 77385 HC NTSTY MODUL RAD TX DLVR SMPL: CPT | Performed by: RADIOLOGY

## 2024-06-18 ENCOUNTER — HOSPITAL ENCOUNTER (OUTPATIENT)
Dept: RADIATION ONCOLOGY | Age: 64
Discharge: HOME | End: 2024-06-18
Payer: MEDICARE

## 2024-06-18 PROCEDURE — 77300 RADIATION THERAPY DOSE PLAN: CPT | Performed by: RADIOLOGY

## 2024-06-18 PROCEDURE — 77338 DESIGN MLC DEVICE FOR IMRT: CPT | Performed by: RADIOLOGY

## 2024-06-18 PROCEDURE — 77385 HC NTSTY MODUL RAD TX DLVR SMPL: CPT | Performed by: RADIOLOGY

## 2024-06-19 ENCOUNTER — HOSPITAL ENCOUNTER (OUTPATIENT)
Dept: RADIATION ONCOLOGY | Age: 64
Discharge: HOME | End: 2024-06-19
Payer: MEDICARE

## 2024-06-19 VITALS
SYSTOLIC BLOOD PRESSURE: 159 MMHG | RESPIRATION RATE: 16 BRPM | OXYGEN SATURATION: 97 % | BODY MASS INDEX: 36.18 KG/M2 | WEIGHT: 274.2 LBS | HEART RATE: 63 BPM | DIASTOLIC BLOOD PRESSURE: 78 MMHG | TEMPERATURE: 97.6 F

## 2024-06-19 PROCEDURE — 77385 HC NTSTY MODUL RAD TX DLVR SMPL: CPT | Performed by: RADIOLOGY

## 2024-06-19 NOTE — PROGRESS NOTES
Danyel Palmer  6/19/2024  Wt Readings from Last 3 Encounters:   06/19/24 124.4 kg (274 lb 3.2 oz)   06/12/24 124 kg (273 lb 6.4 oz)   06/05/24 123.5 kg (272 lb 3.2 oz)     Body mass index is 36.18 kg/m².        Treatment Area: prostate    Patient was seen today for weekly visit.     Comfort Alteration    Fatigue: Mild    Nutritional Alteration  Anorexia: No  Nausea: No  Vomiting: No     Elimination Alterations  Constipation: no  Diarrhea:  no  Urinary Frequency/Urgency: Yes  Urinary Retention: No  Dysuria: No  Urinary Incontinence: No  Proctitis: No  Nocturia: Yes #/night: 2     Emotional  Coping: effective      Skin Alteration   Sensation: normal    Radiation Dermatitis:  Intact [x]     Erythema  []     Discoloration  []     Rash []     Dry desquamation  []     Moist desquamation []           Injury, potential bleeding or infection:  none    Lab Results   Component Value Date    WBC 7.9 02/09/2024    HGB 14.8 02/09/2024    HCT 45.2 02/09/2024     02/09/2024         BP (!) 159/78   Pulse 63   Temp 97.6 °F (36.4 °C) (Temporal)   Resp 16   Wt 124.4 kg (274 lb 3.2 oz)   SpO2 97%   BMI 36.18 kg/m²      Pain Assessment: None - Denies Pain            Assessment/Plan: Patient was seen today for weekly visit. Ambulatory on arrival with steady gait.  He denies any changes to bowels or bladder.  Reports having a root canal today as he has been having dental \"issues\".  He reports that at times he has more gas than usual.  Patient encouraged to use Gas-X as needed for this.  Dr. Carrasco evaluated patient.  Continue plan of care.    Day Rabago RN

## 2024-06-19 NOTE — PROGRESS NOTES
[facility]       Radiation Oncology          [address]          [place] [address]        O: [phone]        F: [phone]       [facility]             RADIATION ONCOLOGY WEEKLY PROGRESS NOTE  Patient ID:   Danyel Palmer  : 1960   MRN: [id number]    Location:  [facility] Radiation Oncology,   [address] [place] [address]   [phone]    DIAGNOSIS:  Cancer Staging   Prostate cancer (HCC)  Staging form: Prostate, AJCC 8th Edition  - Pathologic stage from 2021: Stage IIIB (pT3a, pN0, cM0, PSA: 15.9, Grade Group: 2) - Signed by Dewey Ulloa MD on 6/15/2021      TREATMENT DETAILS:  Treatment Site: Prostate Bed LNs  Actual Dose: 3420cGy  Total Planned Dose: 7020cGy  Treatment Technique: IMRT  Fraction Technique: Daily  Therapy imaging monitoring: CBCT daily  Concurrent Systemic Therapy: NA    SUBJECTIVE:   Patient seen for their weekly on treatment evaluation today.  Patient has been using his stool softener notes his bowels are stable.  He denies any urinary issues.  He continues to his Kegel exercises.  He denies any fatigue.  He did see his dentist and found that he needs to have a root canal.    OBJECTIVE:   CHAPERONE: Declined    ECO Asymptomatic    VITAL SIGNS: BP (!) 159/78   Pulse 63   Temp 97.6 °F (36.4 °C) (Temporal)   Resp 16   Wt 124.4 kg (274 lb 3.2 oz)   SpO2 97%   BMI 36.18 kg/m²   Wt Readings from Last 5 Encounters:   24 124.4 kg (274 lb 3.2 oz)   24 124 kg (273 lb 6.4 oz)   24 123.5 kg (272 lb 3.2 oz)   24 127.7 kg (281 lb 9.6 oz)   24 125 kg (275 lb 9.6 oz)     GENERAL:  General appearance is that of a well-nourished, well-developed in no apparent distress.  HEART:  Normal rate and regular rhythm, normal heart sounds.   LUNGS:  Pulmonary effort normal. Normal breath sounds.   ABDOMEN:  Soft, nontender, non distended  EXTREMITIES:  No edema.  Normal ROM.  NEUROLOGICAL: Alert

## 2024-06-20 ENCOUNTER — HOSPITAL ENCOUNTER (OUTPATIENT)
Dept: RADIATION ONCOLOGY | Age: 64
Discharge: HOME | End: 2024-06-20
Payer: MEDICARE

## 2024-06-20 PROCEDURE — 77385 HC NTSTY MODUL RAD TX DLVR SMPL: CPT | Performed by: RADIOLOGY

## 2024-06-21 ENCOUNTER — HOSPITAL ENCOUNTER (OUTPATIENT)
Dept: RADIATION ONCOLOGY | Age: 64
Discharge: HOME | End: 2024-06-21
Payer: MEDICARE

## 2024-06-21 PROCEDURE — 77385 HC NTSTY MODUL RAD TX DLVR SMPL: CPT | Performed by: RADIOLOGY

## 2024-06-24 ENCOUNTER — HOSPITAL ENCOUNTER (OUTPATIENT)
Dept: RADIATION ONCOLOGY | Age: 64
Discharge: HOME | End: 2024-06-24
Payer: MEDICARE

## 2024-06-24 PROCEDURE — 77385 HC NTSTY MODUL RAD TX DLVR SMPL: CPT | Performed by: RADIOLOGY

## 2024-06-25 ENCOUNTER — HOSPITAL ENCOUNTER (OUTPATIENT)
Dept: RADIATION ONCOLOGY | Age: 64
Discharge: HOME | End: 2024-06-25
Payer: MEDICARE

## 2024-06-25 PROCEDURE — 77385 HC NTSTY MODUL RAD TX DLVR SMPL: CPT | Performed by: RADIOLOGY

## 2024-06-26 ENCOUNTER — HOSPITAL ENCOUNTER (OUTPATIENT)
Dept: RADIATION ONCOLOGY | Age: 64
Discharge: HOME | End: 2024-06-26
Payer: MEDICARE

## 2024-06-26 VITALS
WEIGHT: 270.4 LBS | TEMPERATURE: 97 F | RESPIRATION RATE: 16 BRPM | DIASTOLIC BLOOD PRESSURE: 84 MMHG | SYSTOLIC BLOOD PRESSURE: 129 MMHG | BODY MASS INDEX: 35.67 KG/M2 | HEART RATE: 74 BPM

## 2024-06-26 PROCEDURE — 77385 HC NTSTY MODUL RAD TX DLVR SMPL: CPT | Performed by: RADIOLOGY

## 2024-06-26 PROCEDURE — 77336 RADIATION PHYSICS CONSULT: CPT | Performed by: RADIOLOGY

## 2024-06-26 NOTE — PROGRESS NOTES
Danyel Palmer  6/26/2024  Wt Readings from Last 3 Encounters:   06/26/24 122.7 kg (270 lb 6.4 oz)   06/19/24 124.4 kg (274 lb 3.2 oz)   06/12/24 124 kg (273 lb 6.4 oz)     Body mass index is 35.67 kg/m².        Treatment Area: prostate    Patient was seen today for weekly visit.     Comfort Alteration    Fatigue: Mild    Nutritional Alteration  Anorexia: No  Nausea: No  Vomiting: No     Elimination Alterations  Constipation: no  Diarrhea:  no  Urinary Frequency/Urgency: Yes (manageable)  Urinary Retention: No  Dysuria: No  Urinary Incontinence: No  Proctitis: No  Nocturia: Yes #/night: 2     Emotional  Coping: effective      Skin Alteration   Sensation: normal    Radiation Dermatitis:  Intact [x]     Erythema  []     Discoloration  []     Rash []     Dry desquamation  []     Moist desquamation []           Injury, potential bleeding or infection:  none    Lab Results   Component Value Date    WBC 7.9 02/09/2024    HGB 14.8 02/09/2024    HCT 45.2 02/09/2024     02/09/2024         /84   Pulse 74   Temp 97 °F (36.1 °C) (Temporal)   Resp 16   Wt 122.7 kg (270 lb 6.4 oz)   BMI 35.67 kg/m²                   Assessment/Plan: Patient was seen today for weekly visit. Patient denies any changes in symptoms or side effects since last week other than diarrhea that he attributes to eating too much watermelon.  Denies bothersome fatigue.  Plan of care ongoing.    Jennifer Bai RN  
behavior normal.  SKIN: No erythema, no desquamation.      LABS:  WBC   Date Value Ref Range Status   02/09/2024 7.9 3.5 - 11.3 k/uL Final   01/05/2023 7.7 3.5 - 11.3 k/uL Final   09/07/2021 6.9 3.5 - 11.3 k/uL Final     Neutrophils Absolute   Date Value Ref Range Status   09/07/2021 3.75 1.50 - 8.10 k/uL Final   11/03/2020 4.20 1.3 - 9.1 k/uL Final   09/25/2019 4.22 1.50 - 8.10 k/uL Final     Hemoglobin   Date Value Ref Range Status   02/09/2024 14.8 13.0 - 17.0 g/dL Final   01/05/2023 14.6 13.0 - 17.0 g/dL Final   09/07/2021 12.3 (L) 13.0 - 17.0 g/dL Final     Platelets   Date Value Ref Range Status   02/09/2024 318 138 - 453 k/uL Final   01/05/2023 345 138 - 453 k/uL Final   09/07/2021 357 138 - 453 k/uL Final     Creatinine   Date Value Ref Range Status   02/09/2024 1.8 (H) 0.7 - 1.2 mg/dL Final   07/06/2023 2.40 (H) 0.70 - 1.20 mg/dL Final   06/29/2023 1.97 (H) 0.70 - 1.20 mg/dL Final     No results found for: \"\", \"CEA\"  PSA   Date Value Ref Range Status   04/30/2024 0.10 0.00 - 4.00 ng/mL Final     Comment:     The Roche \"ECLIA\" assay is used.  Results obtained with different assay methods cannot be   used interchangeably.     02/09/2024 0.11 <4.1 ng/mL Final     Comment:     The Roche \"ECLIA\" assay is used.  Results obtained with different assay methods cannot be   used interchangeably.     06/29/2023 0.08 <4.1 ng/mL Final     Comment:     The Roche \"ECLIA\" assay is used.  Results obtained with different assay methods cannot be   used interchangeably.     01/12/2023 0.07 <4.1 ng/mL Final     Comment:     The Roche \"ECLIA\" assay is used.  Results obtained with different assay methods cannot be   used interchangeably.     07/07/2022 0.06 <4.1 ng/mL Final     Comment:     The Roche \"ECLIA\" assay is used.  Results obtained with different assay methods cannot be   used interchangeably.         MEDICATIONS:    Current Outpatient Medications:     dapagliflozin (FARXIGA) 10 MG tablet, Take 1 tablet by mouth

## 2024-06-27 ENCOUNTER — HOSPITAL ENCOUNTER (OUTPATIENT)
Dept: RADIATION ONCOLOGY | Age: 64
Discharge: HOME | End: 2024-06-27
Payer: MEDICARE

## 2024-06-27 PROCEDURE — 77385 HC NTSTY MODUL RAD TX DLVR SMPL: CPT | Performed by: RADIOLOGY

## 2024-06-28 ENCOUNTER — HOSPITAL ENCOUNTER (OUTPATIENT)
Dept: RADIATION ONCOLOGY | Age: 64
Discharge: HOME | End: 2024-06-28
Payer: MEDICARE

## 2024-06-28 PROCEDURE — 77385 HC NTSTY MODUL RAD TX DLVR SMPL: CPT | Performed by: RADIOLOGY

## 2024-06-30 ENCOUNTER — HOSPITAL ENCOUNTER (OUTPATIENT)
Dept: RADIATION ONCOLOGY | Age: 64
End: 2024-06-30
Payer: MEDICARE

## 2024-06-30 PROCEDURE — 77385 HC NTSTY MODUL RAD TX DLVR SMPL: CPT | Performed by: RADIOLOGY

## 2024-07-01 ENCOUNTER — HOSPITAL ENCOUNTER (OUTPATIENT)
Dept: RADIATION ONCOLOGY | Age: 64
Discharge: HOME | End: 2024-07-01
Payer: MEDICARE

## 2024-07-01 PROCEDURE — 77385 HC NTSTY MODUL RAD TX DLVR SMPL: CPT | Performed by: RADIOLOGY

## 2024-07-02 ENCOUNTER — HOSPITAL ENCOUNTER (OUTPATIENT)
Dept: RADIATION ONCOLOGY | Age: 64
Discharge: HOME | End: 2024-07-02
Payer: MEDICARE

## 2024-07-02 PROCEDURE — 77385 HC NTSTY MODUL RAD TX DLVR SMPL: CPT | Performed by: RADIOLOGY

## 2024-07-03 ENCOUNTER — HOSPITAL ENCOUNTER (OUTPATIENT)
Dept: RADIATION ONCOLOGY | Age: 64
Discharge: HOME | End: 2024-07-03
Payer: MEDICARE

## 2024-07-03 VITALS
SYSTOLIC BLOOD PRESSURE: 128 MMHG | HEART RATE: 68 BPM | OXYGEN SATURATION: 100 % | WEIGHT: 267.8 LBS | DIASTOLIC BLOOD PRESSURE: 75 MMHG | BODY MASS INDEX: 35.33 KG/M2 | TEMPERATURE: 97.3 F | RESPIRATION RATE: 16 BRPM

## 2024-07-03 PROCEDURE — 77385 HC NTSTY MODUL RAD TX DLVR SMPL: CPT | Performed by: RADIOLOGY

## 2024-07-03 PROCEDURE — 77336 RADIATION PHYSICS CONSULT: CPT | Performed by: RADIOLOGY

## 2024-07-03 NOTE — PROGRESS NOTES
Danyel Estela  7/3/2024  Wt Readings from Last 3 Encounters:   07/03/24 121.5 kg (267 lb 12.8 oz)   06/26/24 122.7 kg (270 lb 6.4 oz)   06/19/24 124.4 kg (274 lb 3.2 oz)     Body mass index is 35.33 kg/m².        Treatment Area: prostate    Patient was seen today for weekly visit.     Comfort Alteration    Fatigue: Mild    Nutritional Alteration  Anorexia: No  Nausea: No  Vomiting: No     Elimination Alterations  Constipation: no  Diarrhea:  no  Urinary Frequency/Urgency: Yes  Urinary Retention: No  Dysuria: No  Urinary Incontinence: No  Proctitis: No  Nocturia: Yes #/night: 2     Emotional  Coping: effective      Skin Alteration   Sensation:     Radiation Dermatitis:  Intact [x]     Erythema  []     Discoloration  []     Rash []     Dry desquamation  []     Moist desquamation []           Injury, potential bleeding or infection: n/a    Lab Results   Component Value Date    WBC 7.9 02/09/2024    HGB 14.8 02/09/2024    HCT 45.2 02/09/2024     02/09/2024         /75   Pulse 68   Temp 97.3 °F (36.3 °C) (Temporal)   Resp 16   Wt 121.5 kg (267 lb 12.8 oz)   SpO2 100%   BMI 35.33 kg/m²      Pain Assessment: None - Denies Pain            Assessment/Plan: Patient was seen today for weekly visit with Dr. Carrasco.  Ambulatory on arrival with steady gate.  Denies any pain or burning with urination.  States frequency but more so with the more he drinks.  Gets up about 2-3 times a night.  Is trying to eat better to lose some weight.  Will continue plan of care.    Briseida Curiel RN  
by mouth every morning, Disp: , Rfl:     sildenafil (VIAGRA) 100 MG tablet, Take 1 tablet by mouth daily as needed for Erectile Dysfunction, Disp: 15 tablet, Rfl: 3    Vitamin D (CHOLECALCIFEROL) 25 MCG (1000 UT) TABS tablet, Take 1 tablet by mouth daily, Disp: , Rfl:     SITagliptin (JANUVIA) 50 MG tablet, Take 1 tablet by mouth daily, Disp: , Rfl:     atenolol (TENORMIN) 50 MG tablet, Take 1 tablet by mouth daily, Disp: , Rfl:     hydroCHLOROthiazide (HYDRODIURIL) 25 MG tablet, Take 1 tablet by mouth daily, Disp: , Rfl:     lisinopril (PRINIVIL;ZESTRIL) 40 MG tablet, Take 1 tablet by mouth daily, Disp: , Rfl:     pravastatin (PRAVACHOL) 40 MG tablet, Take 1 tablet by mouth daily, Disp: , Rfl:       ASSESSMENT PLAN:   Treatment setup and plan reviewed. Port images/CBCT images reviewed. Appropriate laboratory work was reviewed. Treatment side effects and toxicities reviewed with the patient, and appropriate management was advised. Will continue radiation treatment as planned, and recommend patient contact us if they have any questions or concerns.  Continue using stool softeners daily and monitor for urinary and bowel changes.    Electronically signed by Lion Carrasco MD on 7/3/2024 at 1:58 PM      Drugs Prescribed:  New Prescriptions    No medications on file       Other Orders Placed:  No orders of the defined types were placed in this encounter.

## 2024-07-05 ENCOUNTER — APPOINTMENT (OUTPATIENT)
Dept: RADIATION ONCOLOGY | Age: 64
End: 2024-07-05
Payer: MEDICARE

## 2024-07-08 ENCOUNTER — HOSPITAL ENCOUNTER (OUTPATIENT)
Dept: RADIATION ONCOLOGY | Age: 64
Discharge: HOME | End: 2024-07-08
Payer: MEDICARE

## 2024-07-08 PROCEDURE — 77385 HC NTSTY MODUL RAD TX DLVR SMPL: CPT | Performed by: RADIOLOGY

## 2024-07-09 ENCOUNTER — HOSPITAL ENCOUNTER (OUTPATIENT)
Dept: RADIATION ONCOLOGY | Age: 64
Discharge: HOME | End: 2024-07-09
Payer: MEDICARE

## 2024-07-09 PROCEDURE — 77385 HC NTSTY MODUL RAD TX DLVR SMPL: CPT | Performed by: RADIOLOGY

## 2024-07-10 ENCOUNTER — HOSPITAL ENCOUNTER (OUTPATIENT)
Dept: RADIATION ONCOLOGY | Age: 64
Discharge: HOME | End: 2024-07-10
Payer: MEDICARE

## 2024-07-10 VITALS
WEIGHT: 266 LBS | HEART RATE: 61 BPM | TEMPERATURE: 97.3 F | DIASTOLIC BLOOD PRESSURE: 54 MMHG | RESPIRATION RATE: 16 BRPM | BODY MASS INDEX: 35.09 KG/M2 | SYSTOLIC BLOOD PRESSURE: 144 MMHG

## 2024-07-10 PROCEDURE — 77336 RADIATION PHYSICS CONSULT: CPT | Performed by: RADIOLOGY

## 2024-07-10 PROCEDURE — 77385 HC NTSTY MODUL RAD TX DLVR SMPL: CPT | Performed by: RADIOLOGY

## 2024-07-10 NOTE — PROGRESS NOTES
Danyel Palmer  7/10/2024  Wt Readings from Last 3 Encounters:   07/10/24 120.7 kg (266 lb)   07/03/24 121.5 kg (267 lb 12.8 oz)   06/26/24 122.7 kg (270 lb 6.4 oz)     Body mass index is 35.09 kg/m².        Treatment Area: prostate    Patient was seen today for weekly visit.     Comfort Alteration    Fatigue: Mild    Nutritional Alteration  Anorexia: No  Nausea: No  Vomiting: No     Elimination Alterations  Constipation: no  Diarrhea:  no  Urinary Frequency/Urgency: Yes  Urinary Retention: No  Dysuria: No  Urinary Incontinence: No  Proctitis: No  Nocturia: Yes #/night: 5     Emotional  Coping: effective      Skin Alteration   Sensation:     Radiation Dermatitis:  Intact [x]     Erythema  []     Discoloration  []     Rash []     Dry desquamation  []     Moist desquamation []           Injury, potential bleeding or infection: n/a    Lab Results   Component Value Date    WBC 7.9 02/09/2024    HGB 14.8 02/09/2024    HCT 45.2 02/09/2024     02/09/2024         BP (!) 144/54   Pulse 61   Temp 97.3 °F (36.3 °C) (Temporal)   Resp 16   Wt 120.7 kg (266 lb)   BMI 35.09 kg/m²                   Assessment/Plan: Patient was seen today for weekly visit with Dr. Carrasco.  More nocturia last night (5 times) and notes general urinary frequency with burning with urination.  Denies fatigue.  States side effects in general are manageable.  Plan of are ongoing.  Jennifer Bai RN

## 2024-07-10 NOTE — PROGRESS NOTES
[facility]       Radiation Oncology          [address] [place] [address]        O: [phone]        F: [phone]       [facility]             RADIATION ONCOLOGY WEEKLY PROGRESS NOTE  Patient ID:   Danyel Palmer  : 1960   MRN: [id number]    Location:  [facility] Radiation Oncology,   [address] [place] [address]   [phone]    DIAGNOSIS:  Cancer Staging   Prostate cancer (HCC)  Staging form: Prostate, AJCC 8th Edition  - Pathologic stage from 2021: Stage IIIB (pT3a, pN0, cM0, PSA: 15.9, Grade Group: 2) - Signed by Dewey Ulloa MD on 6/15/2021      TREATMENT DETAILS:  Treatment Site: Prostate Bed LNs  Actual Dose: 5940cGy  Total Planned Dose: 7020cGy  Treatment Technique: IMRT  Fraction Technique: Daily  Therapy imaging monitoring: CBCT daily  Concurrent Systemic Therapy: NA    SUBJECTIVE:   Patient seen for their weekly on treatment evaluation today.  Patient has been using his stool softener notes his bowels are stable.  He denies any urinary issues.  He did 5 episodes of nocturia yesterday but denies any burning.  He continues to his Kegel exercises.  He denies any fatigue.       OBJECTIVE:   CHAPERONE: Declined    ECO Asymptomatic    VITAL SIGNS: BP (!) 144/54   Pulse 61   Temp 97.3 °F (36.3 °C) (Temporal)   Resp 16   Wt 120.7 kg (266 lb)   BMI 35.09 kg/m²   Wt Readings from Last 5 Encounters:   07/10/24 120.7 kg (266 lb)   24 121.5 kg (267 lb 12.8 oz)   24 122.7 kg (270 lb 6.4 oz)   24 124.4 kg (274 lb 3.2 oz)   24 124 kg (273 lb 6.4 oz)     GENERAL:  General appearance is that of a well-nourished, well-developed in no apparent distress.  HEART:  Normal rate and regular rhythm, normal heart sounds.   LUNGS:  Pulmonary effort normal. Normal breath sounds.   ABDOMEN:  Soft, nontender, non distended  EXTREMITIES:  No edema.  Normal ROM.  NEUROLOGICAL: Alert and oriented. Strength

## 2024-07-11 ENCOUNTER — HOSPITAL ENCOUNTER (OUTPATIENT)
Dept: RADIATION ONCOLOGY | Age: 64
Discharge: HOME | End: 2024-07-11
Payer: MEDICARE

## 2024-07-11 PROCEDURE — 77385 HC NTSTY MODUL RAD TX DLVR SMPL: CPT | Performed by: RADIOLOGY

## 2024-07-12 ENCOUNTER — HOSPITAL ENCOUNTER (OUTPATIENT)
Dept: RADIATION ONCOLOGY | Age: 64
Discharge: HOME | End: 2024-07-12
Payer: MEDICARE

## 2024-07-12 PROCEDURE — 77385 HC NTSTY MODUL RAD TX DLVR SMPL: CPT | Performed by: RADIOLOGY

## 2024-07-15 ENCOUNTER — HOSPITAL ENCOUNTER (OUTPATIENT)
Dept: RADIATION ONCOLOGY | Age: 64
Discharge: HOME | End: 2024-07-15
Payer: MEDICARE

## 2024-07-15 PROCEDURE — 77385 HC NTSTY MODUL RAD TX DLVR SMPL: CPT | Performed by: RADIOLOGY

## 2024-07-16 ENCOUNTER — HOSPITAL ENCOUNTER (OUTPATIENT)
Dept: RADIATION ONCOLOGY | Age: 64
Discharge: HOME | End: 2024-07-16
Payer: MEDICARE

## 2024-07-16 PROCEDURE — 77385 HC NTSTY MODUL RAD TX DLVR SMPL: CPT | Performed by: RADIOLOGY

## 2024-07-17 ENCOUNTER — HOSPITAL ENCOUNTER (OUTPATIENT)
Dept: RADIATION ONCOLOGY | Age: 64
Discharge: HOME | End: 2024-07-17
Payer: MEDICARE

## 2024-07-17 VITALS
DIASTOLIC BLOOD PRESSURE: 85 MMHG | HEART RATE: 56 BPM | RESPIRATION RATE: 16 BRPM | SYSTOLIC BLOOD PRESSURE: 132 MMHG | TEMPERATURE: 97.2 F | WEIGHT: 265 LBS | BODY MASS INDEX: 34.96 KG/M2

## 2024-07-17 DIAGNOSIS — C61 PROSTATE CANCER (HCC): Primary | Chronic | ICD-10-CM

## 2024-07-17 PROCEDURE — 77385 HC NTSTY MODUL RAD TX DLVR SMPL: CPT | Performed by: RADIOLOGY

## 2024-07-17 NOTE — PROGRESS NOTES
Danyel Palmer  7/17/2024  Wt Readings from Last 3 Encounters:   07/17/24 120.2 kg (265 lb)   07/10/24 120.7 kg (266 lb)   07/03/24 121.5 kg (267 lb 12.8 oz)     Body mass index is 34.96 kg/m².        Treatment Area: prostate    Patient was seen today for weekly visit.     Comfort Alteration    Fatigue: Mild    Nutritional Alteration  Anorexia: No  Nausea: No  Vomiting: No     Elimination Alterations  Constipation: no  Diarrhea:  no  Urinary Frequency/Urgency: Yes  Urinary Retention: No  Dysuria: No  Urinary Incontinence: No  Proctitis: No  Nocturia: Yes #/night: 5     Emotional  Coping: effective      Skin Alteration   Sensation:     Radiation Dermatitis:  Intact [x]     Erythema  []     Discoloration  []     Rash []     Dry desquamation  []     Moist desquamation []           Injury, potential bleeding or infection: n/a    Lab Results   Component Value Date    WBC 7.9 02/09/2024    HGB 14.8 02/09/2024    HCT 45.2 02/09/2024     02/09/2024         /85   Pulse 56   Temp 97.2 °F (36.2 °C) (Temporal)   Resp 16   Wt 120.2 kg (265 lb)   BMI 34.96 kg/m²      Pain Assessment: None - Denies Pain            Assessment/Plan: Patient was seen today for weekly visit with Dr. Carrasco.  He denies significant changes in symptoms/side effects since last week.  Jennifer Bai RN

## 2024-07-17 NOTE — PROGRESS NOTES
[facility]       Radiation Oncology          [address]          [place] [address]        O: [phone]        F: [phone]       [facility]             RADIATION ONCOLOGY WEEKLY PROGRESS NOTE  Patient ID:   Danyel Palmer  : 1960   MRN: [id number]    Location:  [facility] Radiation Oncology,   [address] [place] [address]   [phone]    DIAGNOSIS:  Cancer Staging   Prostate cancer (HCC)  Staging form: Prostate, AJCC 8th Edition  - Pathologic stage from 2021: Stage IIIB (pT3a, pN0, cM0, PSA: 15.9, Grade Group: 2) - Signed by Dewey Ulloa MD on 6/15/2021      TREATMENT DETAILS:  Treatment Site: Prostate Bed LNs  Actual Dose: 6840cGy  Total Planned Dose: 7020cGy  Treatment Technique: IMRT  Fraction Technique: Daily  Therapy imaging monitoring: CBCT daily  Concurrent Systemic Therapy: NA    SUBJECTIVE:   Patient seen for their weekly on treatment evaluation today.  Patient has been using his stool softener notes his bowels are stable.  He denies any urinary issues. He continues to his Kegel exercises.  He denies any fatigue.       OBJECTIVE:   CHAPERONE: Declined    ECO Asymptomatic    VITAL SIGNS: BP (!) 144/54   Pulse 61   Temp 97.3 °F (36.3 °C) (Temporal)   Resp 16   Wt 120.7 kg (266 lb)   BMI 35.09 kg/m²   Wt Readings from Last 5 Encounters:   07/10/24 120.7 kg (266 lb)   24 121.5 kg (267 lb 12.8 oz)   24 122.7 kg (270 lb 6.4 oz)   24 124.4 kg (274 lb 3.2 oz)   24 124 kg (273 lb 6.4 oz)     GENERAL:  General appearance is that of a well-nourished, well-developed in no apparent distress.  HEART:  Normal rate and regular rhythm, normal heart sounds.   LUNGS:  Pulmonary effort normal. Normal breath sounds.   ABDOMEN:  Soft, nontender, non distended  EXTREMITIES:  No edema.  Normal ROM.  NEUROLOGICAL: Alert and oriented. Strength and sensation intact bilaterally.   PSYCH: Mood normal, behavior

## 2024-07-18 ENCOUNTER — HOSPITAL ENCOUNTER (OUTPATIENT)
Dept: RADIATION ONCOLOGY | Age: 64
Discharge: HOME | End: 2024-07-18
Payer: MEDICARE

## 2024-07-18 PROCEDURE — 77385 HC NTSTY MODUL RAD TX DLVR SMPL: CPT | Performed by: RADIOLOGY

## 2024-08-16 ENCOUNTER — HOSPITAL ENCOUNTER (OUTPATIENT)
Age: 64
Setting detail: SPECIMEN
Discharge: HOME | End: 2024-08-16

## 2024-08-16 DIAGNOSIS — C61 PROSTATE CANCER (HCC): Chronic | ICD-10-CM

## 2024-08-16 LAB — PSA SERPL-MCNC: <0.03 NG/ML (ref 0–4)

## 2024-08-22 ENCOUNTER — HOSPITAL ENCOUNTER (OUTPATIENT)
Dept: RADIATION ONCOLOGY | Age: 64
Discharge: HOME | End: 2024-08-22
Payer: MEDICARE

## 2024-08-22 VITALS
WEIGHT: 266.6 LBS | BODY MASS INDEX: 35.17 KG/M2 | RESPIRATION RATE: 16 BRPM | SYSTOLIC BLOOD PRESSURE: 151 MMHG | TEMPERATURE: 97 F | HEART RATE: 96 BPM | DIASTOLIC BLOOD PRESSURE: 78 MMHG

## 2024-08-22 DIAGNOSIS — C61 PROSTATE CANCER (HCC): Primary | Chronic | ICD-10-CM

## 2024-08-22 PROCEDURE — 99212 OFFICE O/P EST SF 10 MIN: CPT | Performed by: RADIOLOGY

## 2024-08-22 NOTE — PROGRESS NOTES
Danyel Palmer  8/22/2024  9:54 AM      Vitals:    08/22/24 0945   BP: (!) 151/78   Pulse: 96   Resp: 16   Temp: 97 °F (36.1 °C)    :     Pain Assessment: None - Denies Pain          Wt Readings from Last 1 Encounters:   08/22/24 120.9 kg (266 lb 9.6 oz)                Current Outpatient Medications:     dapagliflozin (FARXIGA) 10 MG tablet, Take 1 tablet by mouth every morning, Disp: , Rfl:     sildenafil (VIAGRA) 100 MG tablet, Take 1 tablet by mouth daily as needed for Erectile Dysfunction, Disp: 15 tablet, Rfl: 3    Vitamin D (CHOLECALCIFEROL) 25 MCG (1000 UT) TABS tablet, Take 1 tablet by mouth daily, Disp: , Rfl:     SITagliptin (JANUVIA) 50 MG tablet, Take 1 tablet by mouth daily, Disp: , Rfl:     atenolol (TENORMIN) 50 MG tablet, Take 1 tablet by mouth daily, Disp: , Rfl:     hydroCHLOROthiazide (HYDRODIURIL) 25 MG tablet, Take 1 tablet by mouth daily, Disp: , Rfl:     lisinopril (PRINIVIL;ZESTRIL) 40 MG tablet, Take 1 tablet by mouth daily, Disp: , Rfl:     pravastatin (PRAVACHOL) 40 MG tablet, Take 1 tablet by mouth daily, Disp: , Rfl:                FALLS RISK SCREEN  Instructions:  Assess the patient and enter the appropriate indicators that are present for fall risk identification.   Total the numbers entered and assign a fall risk score from Table 2.  Reassess patient at a minimum every 12 weeks or with status change.    Assessment   Date  8/22/2024     1.  Mental Ability: confusion/cognitively impaired 0     2.  Elimination Issues: incontinence, frequency 0       3.  Ambulatory: use of assistive devices (walker, cane, off-loading devices),        attached to equipment (IV pole, oxygen) 0     4.  Sensory Limitations: dizziness, vertigo, impaired vision 0     5.  Age less than 65        0     6.  Age 65 or greater 1     7.  Medication: diuretics, strong analgesics, hypnotics, sedatives,        antihypertensive agents 3   8.  Falls:  recent history of falls within the last 3 months (not to include slipping

## 2024-09-12 ENCOUNTER — OFFICE VISIT (OUTPATIENT)
Dept: DERMATOLOGY | Age: 64
End: 2024-09-12
Payer: MEDICARE

## 2024-09-12 VITALS
SYSTOLIC BLOOD PRESSURE: 148 MMHG | WEIGHT: 268 LBS | HEIGHT: 73 IN | DIASTOLIC BLOOD PRESSURE: 83 MMHG | BODY MASS INDEX: 35.52 KG/M2 | HEART RATE: 71 BPM | TEMPERATURE: 98.4 F

## 2024-09-12 DIAGNOSIS — L91.8 INFLAMED ACROCHORDON: Primary | ICD-10-CM

## 2024-09-12 DIAGNOSIS — R20.8 SKIN TENDERNESS: ICD-10-CM

## 2024-09-12 DIAGNOSIS — L73.8 PITYROSPORUM FOLLICULITIS: ICD-10-CM

## 2024-09-12 PROCEDURE — 1123F ACP DISCUSS/DSCN MKR DOCD: CPT | Performed by: PHYSICIAN ASSISTANT

## 2024-09-12 PROCEDURE — 3077F SYST BP >= 140 MM HG: CPT | Performed by: PHYSICIAN ASSISTANT

## 2024-09-12 PROCEDURE — 11200 RMVL SKIN TAGS UP TO&INC 15: CPT | Performed by: PHYSICIAN ASSISTANT

## 2024-09-12 PROCEDURE — 99204 OFFICE O/P NEW MOD 45 MIN: CPT | Performed by: PHYSICIAN ASSISTANT

## 2024-09-12 PROCEDURE — G8417 CALC BMI ABV UP PARAM F/U: HCPCS | Performed by: PHYSICIAN ASSISTANT

## 2024-09-12 PROCEDURE — 3079F DIAST BP 80-89 MM HG: CPT | Performed by: PHYSICIAN ASSISTANT

## 2024-09-12 PROCEDURE — 1036F TOBACCO NON-USER: CPT | Performed by: PHYSICIAN ASSISTANT

## 2024-09-12 PROCEDURE — 3017F COLORECTAL CA SCREEN DOC REV: CPT | Performed by: PHYSICIAN ASSISTANT

## 2024-09-12 PROCEDURE — G8427 DOCREV CUR MEDS BY ELIG CLIN: HCPCS | Performed by: PHYSICIAN ASSISTANT

## 2024-09-12 RX ORDER — LIDOCAINE HYDROCHLORIDE AND EPINEPHRINE 10; 10 MG/ML; UG/ML
0.3 INJECTION, SOLUTION INFILTRATION; PERINEURAL ONCE
Status: SHIPPED | OUTPATIENT
Start: 2024-09-12

## 2024-09-12 RX ORDER — FLUCONAZOLE 150 MG/1
150 TABLET ORAL DAILY
Qty: 28 TABLET | Refills: 0 | Status: SHIPPED | OUTPATIENT
Start: 2024-09-12 | End: 2024-10-10

## 2024-09-12 RX ORDER — KETOCONAZOLE 20 MG/ML
SHAMPOO TOPICAL
Qty: 120 ML | Refills: 5 | Status: SHIPPED | OUTPATIENT
Start: 2024-09-12

## 2024-11-07 ENCOUNTER — OFFICE VISIT (OUTPATIENT)
Dept: DERMATOLOGY | Age: 64
End: 2024-11-07
Payer: MEDICARE

## 2024-11-07 VITALS
DIASTOLIC BLOOD PRESSURE: 74 MMHG | HEIGHT: 73 IN | BODY MASS INDEX: 34.85 KG/M2 | WEIGHT: 263 LBS | TEMPERATURE: 97.3 F | HEART RATE: 67 BPM | SYSTOLIC BLOOD PRESSURE: 115 MMHG | OXYGEN SATURATION: 98 %

## 2024-11-07 DIAGNOSIS — L73.8 PITYROSPORUM FOLLICULITIS: Primary | ICD-10-CM

## 2024-11-07 PROCEDURE — 1123F ACP DISCUSS/DSCN MKR DOCD: CPT | Performed by: PHYSICIAN ASSISTANT

## 2024-11-07 PROCEDURE — 3017F COLORECTAL CA SCREEN DOC REV: CPT | Performed by: PHYSICIAN ASSISTANT

## 2024-11-07 PROCEDURE — 1036F TOBACCO NON-USER: CPT | Performed by: PHYSICIAN ASSISTANT

## 2024-11-07 PROCEDURE — G8417 CALC BMI ABV UP PARAM F/U: HCPCS | Performed by: PHYSICIAN ASSISTANT

## 2024-11-07 PROCEDURE — G8427 DOCREV CUR MEDS BY ELIG CLIN: HCPCS | Performed by: PHYSICIAN ASSISTANT

## 2024-11-07 PROCEDURE — 3078F DIAST BP <80 MM HG: CPT | Performed by: PHYSICIAN ASSISTANT

## 2024-11-07 PROCEDURE — 3074F SYST BP LT 130 MM HG: CPT | Performed by: PHYSICIAN ASSISTANT

## 2024-11-07 PROCEDURE — 99213 OFFICE O/P EST LOW 20 MIN: CPT | Performed by: PHYSICIAN ASSISTANT

## 2024-11-07 PROCEDURE — G8484 FLU IMMUNIZE NO ADMIN: HCPCS | Performed by: PHYSICIAN ASSISTANT

## 2024-11-07 PROCEDURE — 1159F MED LIST DOCD IN RCRD: CPT | Performed by: PHYSICIAN ASSISTANT

## 2024-11-07 RX ORDER — KETOCONAZOLE 20 MG/ML
SHAMPOO TOPICAL
Qty: 120 ML | Refills: 10 | Status: SHIPPED | OUTPATIENT
Start: 2024-11-07

## 2024-11-07 NOTE — PROGRESS NOTES
Dermatology Patient Note  [facility], [facility] DERMATOLOGY  [address]  [address]  [place] [address]  Dept: [phone]  Dept Fax: [phone]      VISITDATE: 11/7/2024   REFERRING PROVIDER: No ref. provider found      Danyel Palmer is a 64 y.o. 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  Pt took oral fluconazole and used ketoconazole shampoo.    MEDICAL PROBLEMS:  Patient Active Problem List    Diagnosis Date Noted    Stage 3b chronic kidney disease (HCC) 09/24/2021     Baseline creat 1.6 - 1.8       Essential hypertension 09/24/2021    Type 2 diabetes mellitus (HCC) 09/24/2021    Prostate cancer (HCC) 01/13/2021       CURRENT MEDICATIONS:   Current Outpatient Medications   Medication Sig Dispense Refill    ketoconazole (NIZORAL) 2 % shampoo Apply weekly, to dry skin of back, leave on for ten minutes prior to washing off. 120 mL 10    dapagliflozin (FARXIGA) 10 MG tablet Take 1 tablet by mouth every morning      sildenafil (VIAGRA) 100 MG tablet Take 1 tablet by mouth daily as needed for Erectile Dysfunction 15 tablet 3    Vitamin D (CHOLECALCIFEROL) 25 MCG (1000 UT) TABS tablet Take 1 tablet by mouth daily      SITagliptin (JANUVIA) 50 MG tablet Take 1 tablet by mouth daily      atenolol (TENORMIN) 50 MG tablet Take 1 tablet by mouth daily      hydroCHLOROthiazide (HYDRODIURIL) 25 MG tablet Take 1 tablet by mouth daily      lisinopril (PRINIVIL;ZESTRIL) 40 MG tablet Take 1 tablet by mouth daily      pravastatin (PRAVACHOL) 40 MG tablet Take 1 tablet by mouth daily       Current Facility-Administered Medications   Medication Dose Route Frequency Provider Last Rate Last Admin    lidocaine-EPINEPHrine 1 %-1:120711 injection 0.3 mL  0.3 mL IntraDERmal

## 2025-01-20 ENCOUNTER — HOSPITAL ENCOUNTER (OUTPATIENT)
Dept: NEUROLOGY | Age: 65
Discharge: HOME | End: 2025-01-20
Payer: MEDICARE

## 2025-01-20 PROCEDURE — 95886 MUSC TEST DONE W/N TEST COMP: CPT | Performed by: PHYSICAL MEDICINE & REHABILITATION

## 2025-01-20 PROCEDURE — 95909 NRV CNDJ TST 5-6 STUDIES: CPT | Performed by: PHYSICAL MEDICINE & REHABILITATION

## 2025-02-12 ENCOUNTER — HOSPITAL ENCOUNTER (OUTPATIENT)
Age: 65
Setting detail: SPECIMEN
Discharge: HOME | End: 2025-02-12

## 2025-02-12 DIAGNOSIS — C61 PROSTATE CANCER (HCC): Chronic | ICD-10-CM

## 2025-02-12 LAB — PSA SERPL-MCNC: <0.03 NG/ML (ref 0–4)

## 2025-02-18 ENCOUNTER — TELEPHONE (OUTPATIENT)
Dept: ORTHOPEDIC SURGERY | Age: 65
End: 2025-02-18

## 2025-02-18 NOTE — TELEPHONE ENCOUNTER
Called patient regarding a referral that was sent to our office. Left a message with the number to call and schedule with resident clininc.

## 2025-02-20 ENCOUNTER — HOSPITAL ENCOUNTER (OUTPATIENT)
Dept: RADIATION ONCOLOGY | Age: 65
Discharge: HOME | End: 2025-02-20
Payer: MEDICARE

## 2025-02-20 VITALS
WEIGHT: 270.3 LBS | DIASTOLIC BLOOD PRESSURE: 80 MMHG | OXYGEN SATURATION: 98 % | TEMPERATURE: 98.4 F | HEART RATE: 66 BPM | RESPIRATION RATE: 16 BRPM | BODY MASS INDEX: 35.66 KG/M2 | SYSTOLIC BLOOD PRESSURE: 130 MMHG

## 2025-02-20 DIAGNOSIS — C61 PROSTATE CANCER (HCC): Primary | Chronic | ICD-10-CM

## 2025-02-20 PROCEDURE — 99212 OFFICE O/P EST SF 10 MIN: CPT | Performed by: RADIOLOGY

## 2025-02-20 ASSESSMENT — PAIN SCALES - GENERAL: PAINLEVEL_OUTOF10: 0

## 2025-02-20 NOTE — PROGRESS NOTES
[facility]            Radiation Oncology          [address] [place] [address]        O: [phone]        F: [phone]       [email or web address]           Date of Service: 2025     Location:  [facility] Radiation Oncology,   [address] [place] [id number] [phone]       RADIATION ONCOLOGY FOLLOW UP NOTE    Patient ID:   Danyel Palmer  : 1960   MRN: [id number]    DIAGNOSIS:  Cancer Staging   Prostate cancer (HCC)  Staging form: Prostate, AJCC 8th Edition  - Pathologic stage from 2021: Stage IIIB (pT3a, pN0, cM0, PSA: 15.9, Grade Group: 2) - Signed by Dewey Ulloa MD on 6/15/2021      -s/p RP 21 GS 3+4 pT3a with (+) margins  -s/p adj RT 70.2Gy 24      INTERVAL HISTORY:   Danyel Palmer is a 64 y.o.. 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  Patient was seen for consultation as he had a detectable postoperative PSA of 0.05 on 2021.  Patient was referred for pelvic floor rehabilitation as he was having issues with urinary continence postoperatively.  Patient did get improvement in control and had a PSA rise up to 0.11 on 2024.  Given this patient ultimately underwent a course of adjuvant radiation treatment completed on 2024.    Patient is here for a routine posttreatment follow-up.  He continues to report baseline stress incontinence, wears pads daily, but overall able to have good control.  He reports nocturia approximately twice at night.  He denies hematuria or hematochezia.  His PSA remains undetectable.    MEDICATIONS:    Current Outpatient Medications:     ketoconazole (NIZORAL) 2 % shampoo, Apply weekly, to dry skin of back, leave on for ten minutes prior

## 2025-02-20 NOTE — PROGRESS NOTES
Danyel Palmer  2/20/2025  9:31 AM      Vitals:    02/20/25 0915   BP: 130/80   Pulse: 66   Resp: 16   Temp: 98.4 °F (36.9 °C)   SpO2: 98%    :     Pain Assessment: 0-10  Pain Level: 0       Wt Readings from Last 1 Encounters:   02/20/25 122.6 kg (270 lb 4.8 oz)                Current Outpatient Medications:     ketoconazole (NIZORAL) 2 % shampoo, Apply weekly, to dry skin of back, leave on for ten minutes prior to washing off., Disp: 120 mL, Rfl: 10    dapagliflozin (FARXIGA) 10 MG tablet, Take 1 tablet by mouth every morning, Disp: , Rfl:     sildenafil (VIAGRA) 100 MG tablet, Take 1 tablet by mouth daily as needed for Erectile Dysfunction, Disp: 15 tablet, Rfl: 3    Vitamin D (CHOLECALCIFEROL) 25 MCG (1000 UT) TABS tablet, Take 1 tablet by mouth daily, Disp: , Rfl:     SITagliptin (JANUVIA) 50 MG tablet, Take 1 tablet by mouth daily, Disp: , Rfl:     atenolol (TENORMIN) 50 MG tablet, Take 1 tablet by mouth daily, Disp: , Rfl:     hydroCHLOROthiazide (HYDRODIURIL) 25 MG tablet, Take 1 tablet by mouth daily, Disp: , Rfl:     lisinopril (PRINIVIL;ZESTRIL) 40 MG tablet, Take 1 tablet by mouth daily, Disp: , Rfl:     pravastatin (PRAVACHOL) 40 MG tablet, Take 1 tablet by mouth daily, Disp: , Rfl:     Current Facility-Administered Medications:     lidocaine-EPINEPHrine 1 %-1:069612 injection 0.3 mL, 0.3 mL, IntraDERmal, Once,                FALLS RISK SCREEN  Instructions:  Assess the patient and enter the appropriate indicators that are present for fall risk identification.   Total the numbers entered and assign a fall risk score from Table 2.  Reassess patient at a minimum every 12 weeks or with status change.    Assessment   Date  2/20/2025     1.  Mental Ability: confusion/cognitively impaired 0     2.  Elimination Issues: incontinence, frequency 0       3.  Ambulatory: use of assistive devices (walker, cane, off-loading devices),        attached to equipment (IV pole, oxygen) 0     4.  Sensory Limitations:

## 2025-05-20 ENCOUNTER — HOSPITAL ENCOUNTER (OUTPATIENT)
Age: 65
Setting detail: SPECIMEN
Discharge: HOME | End: 2025-05-20

## 2025-05-20 LAB
ALBUMIN SERPL-MCNC: 4.4 G/DL (ref 3.5–5.2)
ALBUMIN/GLOB SERPL: 1.4 {RATIO} (ref 1–2.5)
ALP SERPL-CCNC: 56 U/L (ref 40–129)
ALT SERPL-CCNC: 17 U/L (ref 10–50)
ANION GAP SERPL CALCULATED.3IONS-SCNC: 12 MMOL/L (ref 9–16)
AST SERPL-CCNC: 22 U/L (ref 10–50)
BASOPHILS # BLD: 0.05 K/UL (ref 0–0.2)
BASOPHILS NFR BLD: 1 % (ref 0–2)
BILIRUB SERPL-MCNC: 0.5 MG/DL (ref 0–1.2)
BUN SERPL-MCNC: 17 MG/DL (ref 8–23)
CALCIUM SERPL-MCNC: 9.5 MG/DL (ref 8.6–10.4)
CHLORIDE SERPL-SCNC: 104 MMOL/L (ref 98–107)
CHOLEST SERPL-MCNC: 180 MG/DL (ref 0–199)
CHOLESTEROL/HDL RATIO: 4.2
CO2 SERPL-SCNC: 26 MMOL/L (ref 20–31)
CREAT SERPL-MCNC: 1.3 MG/DL (ref 0.7–1.2)
CREAT UR-MCNC: 191 MG/DL (ref 39–259)
EOSINOPHIL # BLD: 0.14 K/UL (ref 0–0.44)
EOSINOPHILS RELATIVE PERCENT: 3 % (ref 1–4)
ERYTHROCYTE [DISTWIDTH] IN BLOOD BY AUTOMATED COUNT: 14 % (ref 11.8–14.4)
GFR, ESTIMATED: 61 ML/MIN/1.73M2
GLUCOSE SERPL-MCNC: 107 MG/DL (ref 74–99)
HCT VFR BLD AUTO: 41.2 % (ref 40.7–50.3)
HDLC SERPL-MCNC: 43 MG/DL
HGB BLD-MCNC: 13.5 G/DL (ref 13–17)
IMM GRANULOCYTES # BLD AUTO: <0.03 K/UL (ref 0–0.3)
IMM GRANULOCYTES NFR BLD: 0 %
LDLC SERPL CALC-MCNC: 115 MG/DL (ref 0–100)
LYMPHOCYTES NFR BLD: 1.24 K/UL (ref 1.1–3.7)
LYMPHOCYTES RELATIVE PERCENT: 24 % (ref 24–43)
MCH RBC QN AUTO: 30.4 PG (ref 25.2–33.5)
MCHC RBC AUTO-ENTMCNC: 32.8 G/DL (ref 28.4–34.8)
MCV RBC AUTO: 92.8 FL (ref 82.6–102.9)
MICROALBUMIN UR-MCNC: 24 MG/L (ref 0–20)
MICROALBUMIN/CREAT UR-RTO: 13 MCG/MG CREAT (ref 0–17)
MONOCYTES NFR BLD: 0.63 K/UL (ref 0.1–1.2)
MONOCYTES NFR BLD: 12 % (ref 3–12)
NEUTROPHILS NFR BLD: 60 % (ref 36–65)
NEUTS SEG NFR BLD: 3.03 K/UL (ref 1.5–8.1)
NRBC BLD-RTO: 0 PER 100 WBC
PLATELET # BLD AUTO: 320 K/UL (ref 138–453)
PMV BLD AUTO: 10.5 FL (ref 8.1–13.5)
POTASSIUM SERPL-SCNC: 3.8 MMOL/L (ref 3.7–5.3)
PROT SERPL-MCNC: 7.5 G/DL (ref 6.6–8.7)
PSA SERPL-MCNC: <0.03 NG/ML (ref 0–4)
RBC # BLD AUTO: 4.44 M/UL (ref 4.21–5.77)
SODIUM SERPL-SCNC: 142 MMOL/L (ref 136–145)
TRIGL SERPL-MCNC: 108 MG/DL (ref 0–149)
TSH SERPL DL<=0.05 MIU/L-ACNC: 1.03 UIU/ML (ref 0.27–4.2)
VLDLC SERPL CALC-MCNC: 22 MG/DL (ref 1–30)
WBC OTHER # BLD: 5.1 K/UL (ref 3.5–11.3)

## 2025-07-06 ENCOUNTER — OFFICE VISIT (OUTPATIENT)
Age: 65
End: 2025-07-06

## 2025-07-06 VITALS
SYSTOLIC BLOOD PRESSURE: 145 MMHG | BODY MASS INDEX: 35.78 KG/M2 | HEART RATE: 70 BPM | DIASTOLIC BLOOD PRESSURE: 86 MMHG | WEIGHT: 270 LBS | TEMPERATURE: 98 F | HEIGHT: 73 IN | OXYGEN SATURATION: 98 %

## 2025-07-06 DIAGNOSIS — T78.40XA ACUTE ALLERGIC REACTION, INITIAL ENCOUNTER: Primary | ICD-10-CM

## 2025-07-06 DIAGNOSIS — H57.89 PERIORBITAL SWELLING: ICD-10-CM

## 2025-07-06 DIAGNOSIS — I10 ELEVATED BLOOD PRESSURE READING WITH DIAGNOSIS OF HYPERTENSION: ICD-10-CM

## 2025-07-06 RX ORDER — TRIAMCINOLONE ACETONIDE 1 MG/G
OINTMENT TOPICAL 2 TIMES DAILY
Qty: 80 G | Refills: 0 | Status: SHIPPED | OUTPATIENT
Start: 2025-07-06 | End: 2025-07-13

## 2025-07-06 RX ORDER — FAMOTIDINE 20 MG/1
20 TABLET, FILM COATED ORAL 2 TIMES DAILY
Qty: 10 TABLET | Refills: 0 | Status: SHIPPED | OUTPATIENT
Start: 2025-07-06 | End: 2025-07-11

## 2025-07-06 RX ORDER — LORATADINE 10 MG/1
10 TABLET ORAL DAILY
Qty: 30 TABLET | Refills: 0 | Status: SHIPPED | OUTPATIENT
Start: 2025-07-06

## 2025-07-06 RX ORDER — GLIPIZIDE 10 MG/1
TABLET ORAL
COMMUNITY
Start: 2025-04-14

## 2025-07-06 RX ORDER — DEXAMETHASONE SODIUM PHOSPHATE 10 MG/ML
10 INJECTION, SOLUTION INTRA-ARTICULAR; INTRALESIONAL; INTRAMUSCULAR; INTRAVENOUS; SOFT TISSUE ONCE
Status: COMPLETED | OUTPATIENT
Start: 2025-07-06 | End: 2025-07-06

## 2025-07-06 RX ADMIN — DEXAMETHASONE SODIUM PHOSPHATE 10 MG: 10 INJECTION, SOLUTION INTRA-ARTICULAR; INTRALESIONAL; INTRAMUSCULAR; INTRAVENOUS; SOFT TISSUE at 10:07

## 2025-08-21 ENCOUNTER — HOSPITAL ENCOUNTER (OUTPATIENT)
Age: 65
Setting detail: SPECIMEN
Discharge: HOME | End: 2025-08-21

## 2025-08-21 DIAGNOSIS — C61 PROSTATE CANCER (HCC): Chronic | ICD-10-CM

## 2025-08-21 LAB — PSA SERPL-MCNC: <0.03 NG/ML (ref 0–4)

## (undated) DEVICE — SUTURE PDS II SZ 0 L27IN ABSRB VLT L36MM CT-1 1/2 CIR Z340H

## (undated) DEVICE — METER,URINE,400ML,DRAIN BAG,L/F,LL: Brand: MEDLINE

## (undated) DEVICE — COVER LT HNDL BLU PLAS

## (undated) DEVICE — ADHESIVE SKIN CLSR 0.7ML TOP DERMBND ADV

## (undated) DEVICE — CATHETER URETH 18FR BLLN 30CC 2 W F INF CTRL BARDX

## (undated) DEVICE — COUNTER NDL 10 COUNT HLD 20 FOAM BLK SGL MAG

## (undated) DEVICE — GLOVE SURG SZ 6 THK91MIL LTX FREE SYN POLYISOPRENE ANTI

## (undated) DEVICE — SOLUTION ANTIFOG VIS SYS CLEARIFY LAPSCP

## (undated) DEVICE — SUTURE V-LOC 180 SZ 3-0 L6IN ABSRB GRN V-20 L26MM 1/2 CIR VLOCL0604

## (undated) DEVICE — PROTECTOR ULN NRV PUR FOAM HK LOOP STRP ANATOMICALLY

## (undated) DEVICE — 40580 - THE PINK PAD - ADVANCED TRENDELENBURG POSITIONING KIT: Brand: 40580 - THE PINK PAD - ADVANCED TRENDELENBURG POSITIONING KIT

## (undated) DEVICE — APPLIER SUT CLP FOR 2-0 3-0 4-0 VCRL ABSRB SUT

## (undated) DEVICE — TIP COVER ACCESSORY

## (undated) DEVICE — Device

## (undated) DEVICE — CONTAINER,SPECIMEN,4OZ,OR STRL: Brand: MEDLINE

## (undated) DEVICE — BLADELESS OBTURATOR: Brand: WECK VISTA

## (undated) DEVICE — SUTURE MCRYL SZ 3-0 L27IN ABSRB VLT L17MM RB-1 1/2 CIR Y305H

## (undated) DEVICE — UNDERPAD HOSP W30XL36IN GRN POLYPR HI ABSRB DISP

## (undated) DEVICE — CLIP INT XL YEL POLYMER HEM-O-LOK WECK

## (undated) DEVICE — MAX-CORE® DISPOSABLE CORE BIOPSY INSTRUMENT, 18G X 20CM: Brand: MAX-CORE

## (undated) DEVICE — APPLICATOR MEDICATED 26 CC SOLUTION HI LT ORNG CHLORAPREP

## (undated) DEVICE — GOWN,AURORA,NONREINFORCED,LARGE: Brand: MEDLINE

## (undated) DEVICE — GLOVE ORTHO 7 1/2   MSG9475

## (undated) DEVICE — Z DUPLICATE USE 2517136 APPLICATOR LAP 45 CM 2 FLX VISTASEAL

## (undated) DEVICE — ELECTRO LUBE IS A SINGLE PATIENT USE DEVICE THAT IS INTENDED TO BE USED ON ELECTROSURGICAL ELECTRODES TO REDUCE STICKING.: Brand: KEY SURGICAL ELECTRO LUBE

## (undated) DEVICE — SUTURE V-LOC 180 SZ 0 L9IN ABSRB GRN GS-21 L37MM 1/2 CIR VLOCL0346

## (undated) DEVICE — CANNULA SEAL

## (undated) DEVICE — AIRSEAL 12 MM ACCESS PORT AND PALM GRIP OBTURATOR WITH BLADELESS OPTICAL TIP, 120 MM LENGTH: Brand: AIRSEAL

## (undated) DEVICE — GLOVE SURG 7.5 PF POLYMER WHT STRL SIGN LTX ESSENTIAL LTX

## (undated) DEVICE — TRI-LUMEN FILTERED TUBE SET WITH ACTIVATED CHARCOAL FILTER: Brand: AIRSEAL

## (undated) DEVICE — NEEDLE HYPO 25GA L1.5IN BLU POLYPR HUB S STL REG BVL STR

## (undated) DEVICE — NEEDLE SPNL 22GA L7IN SPINOCAN

## (undated) DEVICE — ARM DRAPE

## (undated) DEVICE — GOWN,SIRUS,NONRNF,SETINSLV,XL,20/CS: Brand: MEDLINE

## (undated) DEVICE — INTENDED FOR TISSUE SEPARATION, AND OTHER PROCEDURES THAT REQUIRE A SHARP SURGICAL BLADE TO PUNCTURE OR CUT.: Brand: BARD-PARKER ® CARBON RIB-BACK BLADES

## (undated) DEVICE — TOWEL,OR,DSP,ST,NATURAL,DLX,4/PK,20PK/CS: Brand: MEDLINE

## (undated) DEVICE — SUTURE VCRL SZ 1 L18IN ABSRB VLT CT-1 L36MM 1/2 CIR J741D

## (undated) DEVICE — GARMENT COMPR STD FOR 17IN CALF UNIF THER FLOTRN

## (undated) DEVICE — PAD,NON-ADHERENT,3X8,STERILE,LF,1/PK: Brand: MEDLINE

## (undated) DEVICE — POSITIONER,HEAD,MULTIRING,36CS: Brand: MEDLINE

## (undated) DEVICE — 3M™ IOBAN™ 2 ANTIMICROBIAL INCISE DRAPE 6650EZ: Brand: IOBAN™ 2

## (undated) DEVICE — Z DISCONTINUED USE 2717540 SUTURE ABSORBABLE MONOFILAMENT 3-0 RB 1 6 IN STRATAFIX SPRL

## (undated) DEVICE — SYRINGE 20ML LL S/C 50

## (undated) DEVICE — GLOVE EXAM M L95IN FNGR THK35MIL PALM THK24MIL OFF WHT

## (undated) DEVICE — GARMENT,MEDLINE,DVT,INT,CALF,MED, GEN2: Brand: MEDLINE

## (undated) DEVICE — BLADE CLIPPER GEN PURP NS

## (undated) DEVICE — SUTURE MCRYL SZ 4-0 L18IN ABSRB UD L16MM PC-3 3/8 CIR PRIM Y845G

## (undated) DEVICE — GLOVE ORANGE PI 7   MSG9070

## (undated) DEVICE — GLOVE,EXAM,NITRILE,RESTORE,OAT SENSE,L: Brand: MEDLINE

## (undated) DEVICE — SEALANT TISS 10 CC FIBRIN VISTASEAL